# Patient Record
Sex: FEMALE | Race: WHITE | NOT HISPANIC OR LATINO | Employment: OTHER | URBAN - METROPOLITAN AREA
[De-identification: names, ages, dates, MRNs, and addresses within clinical notes are randomized per-mention and may not be internally consistent; named-entity substitution may affect disease eponyms.]

---

## 2017-02-08 DIAGNOSIS — M25.50 ARTHRALGIA, UNSPECIFIED JOINT: Primary | ICD-10-CM

## 2017-02-08 RX ORDER — DICLOFENAC SODIUM 75 MG/1
75 TABLET, DELAYED RELEASE ORAL 2 TIMES DAILY
Qty: 60 TABLET | Refills: 1 | Status: SHIPPED | OUTPATIENT
Start: 2017-02-08 | End: 2017-08-31 | Stop reason: SDUPTHER

## 2017-02-08 NOTE — TELEPHONE ENCOUNTER
2/8/17    Can we send this rx refill in to the pharmacy?    This rx was last sent in to the pharmacy on 9/7/16 with no refills.    Pt's last OV was on 12/7/16, next OV is on 6/13/17.

## 2017-03-13 ENCOUNTER — TRANSCRIBE ORDERS (OUTPATIENT)
Dept: INTERNAL MEDICINE | Facility: CLINIC | Age: 67
End: 2017-03-13

## 2017-03-13 DIAGNOSIS — Z12.31 VISIT FOR SCREENING MAMMOGRAM: Primary | ICD-10-CM

## 2017-05-02 ENCOUNTER — HOSPITAL ENCOUNTER (OUTPATIENT)
Dept: MAMMOGRAPHY | Facility: HOSPITAL | Age: 67
Discharge: HOME | End: 2017-05-02
Attending: HOSPITALIST | Admitting: HOSPITALIST

## 2017-05-02 DIAGNOSIS — Z12.31 VISIT FOR SCREENING MAMMOGRAM: ICD-10-CM

## 2017-05-02 PROCEDURE — 77063 BREAST TOMOSYNTHESIS BI: CPT | Performed by: RADIOLOGY

## 2017-05-02 PROCEDURE — G0202 SCR MAMMO BI INCL CAD: HCPCS

## 2017-05-02 PROCEDURE — G0202 SCR MAMMO BI INCL CAD: HCPCS | Performed by: RADIOLOGY

## 2017-05-02 PROCEDURE — 77063 BREAST TOMOSYNTHESIS BI: CPT

## 2017-05-24 ENCOUNTER — TELEPHONE (OUTPATIENT)
Dept: INTERNAL MEDICINE | Facility: CLINIC | Age: 67
End: 2017-05-24

## 2017-05-24 DIAGNOSIS — I10 ESSENTIAL HYPERTENSION: Primary | ICD-10-CM

## 2017-05-24 DIAGNOSIS — E78.49 OTHER HYPERLIPIDEMIA: ICD-10-CM

## 2017-06-05 ENCOUNTER — LAB (OUTPATIENT)
Dept: INTERNAL MEDICINE | Facility: CLINIC | Age: 67
End: 2017-06-05

## 2017-06-05 DIAGNOSIS — I10 ESSENTIAL HYPERTENSION: ICD-10-CM

## 2017-06-05 DIAGNOSIS — E78.49 OTHER HYPERLIPIDEMIA: ICD-10-CM

## 2017-06-05 LAB
ALBUMIN SERPL-MCNC: 4.4 G/DL (ref 3.2–4.8)
ALBUMIN/GLOB SERPL: 1.7 G/DL (ref 1.5–2.5)
ALP SERPL-CCNC: 76 U/L (ref 25–100)
ALT SERPL W P-5'-P-CCNC: 25 U/L (ref 7–40)
ANION GAP SERPL CALCULATED.3IONS-SCNC: 11 MMOL/L (ref 3–11)
ARTICHOKE IGE QN: 182 MG/DL (ref 0–130)
AST SERPL-CCNC: 25 U/L (ref 0–33)
BILIRUB SERPL-MCNC: 1 MG/DL (ref 0.3–1.2)
BUN BLD-MCNC: 15 MG/DL (ref 9–23)
BUN/CREAT SERPL: 18.8 (ref 7–25)
CALCIUM SPEC-SCNC: 9.7 MG/DL (ref 8.7–10.4)
CHLORIDE SERPL-SCNC: 106 MMOL/L (ref 99–109)
CHOLEST SERPL-MCNC: 246 MG/DL (ref 0–200)
CO2 SERPL-SCNC: 24 MMOL/L (ref 20–31)
CREAT BLD-MCNC: 0.8 MG/DL (ref 0.6–1.3)
GFR SERPL CREATININE-BSD FRML MDRD: 71 ML/MIN/1.73
GLOBULIN UR ELPH-MCNC: 2.6 GM/DL
GLUCOSE BLD-MCNC: 105 MG/DL (ref 70–100)
HDLC SERPL-MCNC: 49 MG/DL (ref 40–60)
POTASSIUM BLD-SCNC: 4.4 MMOL/L (ref 3.5–5.5)
PROT SERPL-MCNC: 7 G/DL (ref 5.7–8.2)
SODIUM BLD-SCNC: 141 MMOL/L (ref 132–146)
TRIGL SERPL-MCNC: 170 MG/DL (ref 0–150)

## 2017-06-05 PROCEDURE — 80053 COMPREHEN METABOLIC PANEL: CPT | Performed by: HOSPITALIST

## 2017-06-05 PROCEDURE — 80061 LIPID PANEL: CPT | Performed by: HOSPITALIST

## 2017-06-13 ENCOUNTER — OFFICE VISIT (OUTPATIENT)
Dept: INTERNAL MEDICINE | Facility: CLINIC | Age: 67
End: 2017-06-13

## 2017-06-13 VITALS
DIASTOLIC BLOOD PRESSURE: 70 MMHG | OXYGEN SATURATION: 99 % | SYSTOLIC BLOOD PRESSURE: 122 MMHG | WEIGHT: 195 LBS | HEIGHT: 58 IN | HEART RATE: 58 BPM | BODY MASS INDEX: 40.93 KG/M2

## 2017-06-13 DIAGNOSIS — E66.01 MORBID OBESITY DUE TO EXCESS CALORIES (HCC): ICD-10-CM

## 2017-06-13 DIAGNOSIS — M19.90 ARTHRITIS: ICD-10-CM

## 2017-06-13 DIAGNOSIS — M54.50 CHRONIC BILATERAL LOW BACK PAIN WITHOUT SCIATICA: ICD-10-CM

## 2017-06-13 DIAGNOSIS — R73.9 HYPERGLYCEMIA: ICD-10-CM

## 2017-06-13 DIAGNOSIS — E78.49 OTHER HYPERLIPIDEMIA: Primary | ICD-10-CM

## 2017-06-13 DIAGNOSIS — M79.10 MUSCULAR ACHES: ICD-10-CM

## 2017-06-13 DIAGNOSIS — I10 ESSENTIAL HYPERTENSION: ICD-10-CM

## 2017-06-13 DIAGNOSIS — I87.2 VENOUS INSUFFICIENCY: ICD-10-CM

## 2017-06-13 DIAGNOSIS — G89.29 CHRONIC BILATERAL LOW BACK PAIN WITHOUT SCIATICA: ICD-10-CM

## 2017-06-13 LAB — HBA1C MFR BLD: 5.7 %

## 2017-06-13 PROCEDURE — 99214 OFFICE O/P EST MOD 30 MIN: CPT | Performed by: HOSPITALIST

## 2017-06-13 PROCEDURE — 83036 HEMOGLOBIN GLYCOSYLATED A1C: CPT | Performed by: HOSPITALIST

## 2017-06-13 RX ORDER — ROSUVASTATIN CALCIUM 10 MG/1
10 TABLET, COATED ORAL DAILY
Qty: 90 TABLET | Refills: 1 | Status: SHIPPED | OUTPATIENT
Start: 2017-06-13 | End: 2017-08-31

## 2017-06-13 NOTE — PROGRESS NOTES
Subjective   Bharti Haines is a 67 y.o. female. Essential hypertension; Morbid obesity due to excess calories; Chronic bilateral low back pain without sciatica; Muscular aches; Mixed hyperlipidemia; Med Refill (diclofenac); and discuss lab results (lipid panel, CMP)         HPI Comments: She has been having some calf pain and we again talked about lipid lowering meds. She says she already aches and doesn't want to take meds for it. She has tried statins but they increase her muscle aches. She is willing to try Crestor 10mg. She has also tried to change her diet. She is having very little motivation to do so.       The following portions of the patient's history were reviewed and updated as appropriate: allergies, current medications, past family history, past medical history, past social history, past surgical history and problem list.    Review of Systems   Constitutional: Negative for activity change and appetite change.   HENT: Negative for congestion and drooling.    Eyes: Negative for discharge and itching.   Respiratory: Negative for apnea and chest tightness.    Cardiovascular: Negative for chest pain and leg swelling.   Gastrointestinal: Negative for abdominal distention and abdominal pain.   Endocrine: Negative for cold intolerance and heat intolerance.   Genitourinary: Negative for difficulty urinating and dyspareunia.   Musculoskeletal: Positive for myalgias. Negative for arthralgias and back pain.   Neurological: Negative for dizziness and facial asymmetry.   Hematological: Negative for adenopathy. Does not bruise/bleed easily.   Psychiatric/Behavioral: Negative for agitation and behavioral problems.       Objective   Vitals:    06/13/17 0927   BP: 122/70   Pulse: 58   SpO2: 99%       Physical Exam   Constitutional: She is oriented to person, place, and time. She appears well-developed and well-nourished.   HENT:   Head: Normocephalic and atraumatic.   Eyes: Conjunctivae and EOM are normal. Pupils are  equal, round, and reactive to light.   Neck: Normal range of motion. Neck supple.   Cardiovascular: Normal rate, regular rhythm, normal heart sounds and intact distal pulses.    Pulmonary/Chest: Effort normal and breath sounds normal.   Abdominal: Soft. Bowel sounds are normal.   Neurological: She is alert and oriented to person, place, and time. She has normal reflexes.   Skin: Skin is warm and dry.   Psychiatric: She has a normal mood and affect. Her behavior is normal. Judgment and thought content normal.       Assessment/Plan   Bharti was seen today for essential hypertension, morbid obesity due to excess calories, chronic bilateral low back pain without sciatica, muscular aches, mixed hyperlipidemia, med refill and discuss lab results.    Diagnoses and all orders for this visit:    Other hyperlipidemia  -     rosuvastatin (CRESTOR) 10 MG tablet; Take 1 tablet by mouth Daily.    Morbid obesity due to excess calories  -     Ambulatory Referral to Nutrition Services    Essential hypertension    Venous insufficiency    Muscular aches    Chronic bilateral low back pain without sciatica    Arthritis    Hyperglycemia  -     POC Glycosylated Hemoglobin (Hb A1C)        Results for orders placed or performed in visit on 06/05/17   Comprehensive metabolic panel   Result Value Ref Range    Glucose 105 (H) 70 - 100 mg/dL    BUN 15 9 - 23 mg/dL    Creatinine 0.80 0.60 - 1.30 mg/dL    Sodium 141 132 - 146 mmol/L    Potassium 4.4 3.5 - 5.5 mmol/L    Chloride 106 99 - 109 mmol/L    CO2 24.0 20.0 - 31.0 mmol/L    Calcium 9.7 8.7 - 10.4 mg/dL    Total Protein 7.0 5.7 - 8.2 g/dL    Albumin 4.40 3.20 - 4.80 g/dL    ALT (SGPT) 25 7 - 40 U/L    AST (SGOT) 25 0 - 33 U/L    Alkaline Phosphatase 76 25 - 100 U/L    Total Bilirubin 1.0 0.3 - 1.2 mg/dL    eGFR Non African Amer 71 >60 mL/min/1.73    Globulin 2.6 gm/dL    A/G Ratio 1.7 1.5 - 2.5 g/dL    BUN/Creatinine Ratio 18.8 7.0 - 25.0    Anion Gap 11.0 3.0 - 11.0 mmol/L   Lipid Panel    Result Value Ref Range    Total Cholesterol 246 (H) 0 - 200 mg/dL    Triglycerides 170 (H) 0 - 150 mg/dL    HDL Cholesterol 49 40 - 60 mg/dL    LDL Cholesterol  182 (H) 0 - 130 mg/dL

## 2017-08-14 ENCOUNTER — TELEPHONE (OUTPATIENT)
Dept: INTERNAL MEDICINE | Facility: CLINIC | Age: 67
End: 2017-08-14

## 2017-08-14 NOTE — TELEPHONE ENCOUNTER
Usually I get the patient's to sign a release on the day of their visit to get old records. I do not need Ortho's records in order to complete her visit on 8/31.   This is a new patient visit let her know that I am leaving the end of September.

## 2017-08-14 NOTE — TELEPHONE ENCOUNTER
----- Message from Clarita Brooks sent at 8/14/2017 10:20 AM EDT -----  Contact: SELF  VANNESSA RENTERIA WILL BE HAVING SURGERY IN A MONTH OR SO FOR A KNEE REPLACEMENT. THIS WILL BE DONE AT [facility] BY [facility]. SHE HAS HER FIRST APPT HER ON 8/31/17 AND WANTS TO KNOW IF YOU CAN REQUEST HER RECORDS FROM THAT OFFICE.  [facility] PH: [phone] FAX: [phone]     ASSISTANT CAROLANN VILLATORO PH: [phone]     VANNESSA CAN BE REACHED [phone]

## 2017-08-14 NOTE — TELEPHONE ENCOUNTER
Please advise? Pt was previous Dr. Crawford pt. Has appt scheduled with you on 08/31/17. You have never seen pt before. Would you like records from  Ortho?

## 2017-08-31 ENCOUNTER — OFFICE VISIT (OUTPATIENT)
Dept: INTERNAL MEDICINE | Facility: CLINIC | Age: 67
End: 2017-08-31

## 2017-08-31 VITALS
HEIGHT: 57 IN | RESPIRATION RATE: 16 BRPM | BODY MASS INDEX: 41.94 KG/M2 | HEART RATE: 62 BPM | SYSTOLIC BLOOD PRESSURE: 120 MMHG | DIASTOLIC BLOOD PRESSURE: 72 MMHG | WEIGHT: 194.4 LBS

## 2017-08-31 DIAGNOSIS — M25.50 ARTHRALGIA, UNSPECIFIED JOINT: ICD-10-CM

## 2017-08-31 DIAGNOSIS — E78.2 MIXED HYPERLIPIDEMIA: Primary | ICD-10-CM

## 2017-08-31 DIAGNOSIS — I10 ESSENTIAL HYPERTENSION: ICD-10-CM

## 2017-08-31 DIAGNOSIS — M19.90 OSTEOARTHRITIS, UNSPECIFIED OSTEOARTHRITIS TYPE, UNSPECIFIED SITE: ICD-10-CM

## 2017-08-31 DIAGNOSIS — E78.49 OTHER HYPERLIPIDEMIA: ICD-10-CM

## 2017-08-31 PROCEDURE — 99214 OFFICE O/P EST MOD 30 MIN: CPT | Performed by: INTERNAL MEDICINE

## 2017-08-31 RX ORDER — DICLOFENAC SODIUM 75 MG/1
75 TABLET, DELAYED RELEASE ORAL 2 TIMES DAILY
Qty: 60 TABLET | Refills: 1 | Status: SHIPPED | OUTPATIENT
Start: 2017-08-31 | End: 2017-11-29 | Stop reason: SDUPTHER

## 2017-09-12 ENCOUNTER — HOSPITAL ENCOUNTER (OUTPATIENT)
Dept: GENERAL RADIOLOGY | Facility: HOSPITAL | Age: 67
Discharge: HOME | End: 2017-09-12
Admitting: PHYSICIAN ASSISTANT

## 2017-09-12 ENCOUNTER — OFFICE VISIT (OUTPATIENT)
Dept: INTERNAL MEDICINE | Facility: CLINIC | Age: 67
End: 2017-09-12

## 2017-09-12 VITALS
OXYGEN SATURATION: 97 % | DIASTOLIC BLOOD PRESSURE: 72 MMHG | BODY MASS INDEX: 41.76 KG/M2 | WEIGHT: 193 LBS | RESPIRATION RATE: 16 BRPM | SYSTOLIC BLOOD PRESSURE: 134 MMHG | HEART RATE: 59 BPM | TEMPERATURE: 98.2 F

## 2017-09-12 DIAGNOSIS — Z01.818 PREOP EXAMINATION: Primary | ICD-10-CM

## 2017-09-12 DIAGNOSIS — M25.561 CHRONIC PAIN OF RIGHT KNEE: ICD-10-CM

## 2017-09-12 DIAGNOSIS — R73.09 ELEVATED GLUCOSE: ICD-10-CM

## 2017-09-12 DIAGNOSIS — G89.29 CHRONIC PAIN OF RIGHT KNEE: ICD-10-CM

## 2017-09-12 LAB
ALBUMIN SERPL-MCNC: 4.4 G/DL (ref 3.2–4.8)
ALBUMIN/GLOB SERPL: 1.8 G/DL (ref 1.5–2.5)
ALP SERPL-CCNC: 82 U/L (ref 25–100)
ALT SERPL W P-5'-P-CCNC: 27 U/L (ref 7–40)
ANION GAP SERPL CALCULATED.3IONS-SCNC: 11 MMOL/L (ref 3–11)
AST SERPL-CCNC: 26 U/L (ref 0–33)
BASOPHILS # BLD AUTO: 0.03 10*3/MM3 (ref 0–0.2)
BASOPHILS NFR BLD AUTO: 0.4 % (ref 0–1)
BILIRUB BLD-MCNC: NEGATIVE MG/DL
BILIRUB SERPL-MCNC: 0.9 MG/DL (ref 0.3–1.2)
BUN BLD-MCNC: 14 MG/DL (ref 9–23)
BUN/CREAT SERPL: 17.5 (ref 7–25)
CALCIUM SPEC-SCNC: 9.7 MG/DL (ref 8.7–10.4)
CHLORIDE SERPL-SCNC: 98 MMOL/L (ref 99–109)
CLARITY, POC: CLEAR
CO2 SERPL-SCNC: 28 MMOL/L (ref 20–31)
COLOR UR: YELLOW
CREAT BLD-MCNC: 0.8 MG/DL (ref 0.6–1.3)
DEPRECATED RDW RBC AUTO: 46.3 FL (ref 37–54)
EOSINOPHIL # BLD AUTO: 0.09 10*3/MM3 (ref 0–0.3)
EOSINOPHIL NFR BLD AUTO: 1.2 % (ref 0–3)
ERYTHROCYTE [DISTWIDTH] IN BLOOD BY AUTOMATED COUNT: 14.1 % (ref 11.3–14.5)
EXPIRATION DATE: NORMAL
EXPIRATION DATE: NORMAL
GFR SERPL CREATININE-BSD FRML MDRD: 71 ML/MIN/1.73
GLOBULIN UR ELPH-MCNC: 2.5 GM/DL
GLUCOSE BLD-MCNC: 101 MG/DL (ref 70–100)
GLUCOSE UR STRIP-MCNC: NEGATIVE MG/DL
HBA1C MFR BLD: 5.5 %
HCT VFR BLD AUTO: 39.8 % (ref 34.5–44)
HGB BLD-MCNC: 13.9 G/DL (ref 11.5–15.5)
IMM GRANULOCYTES # BLD: 0.02 10*3/MM3 (ref 0–0.03)
IMM GRANULOCYTES NFR BLD: 0.3 % (ref 0–0.6)
KETONES UR QL: NEGATIVE
LEUKOCYTE EST, POC: NEGATIVE
LYMPHOCYTES # BLD AUTO: 1.79 10*3/MM3 (ref 0.6–4.8)
LYMPHOCYTES NFR BLD AUTO: 24 % (ref 24–44)
Lab: NORMAL
Lab: NORMAL
MCH RBC QN AUTO: 31.8 PG (ref 27–31)
MCHC RBC AUTO-ENTMCNC: 34.9 G/DL (ref 32–36)
MCV RBC AUTO: 91.1 FL (ref 80–99)
MONOCYTES # BLD AUTO: 0.71 10*3/MM3 (ref 0–1)
MONOCYTES NFR BLD AUTO: 9.5 % (ref 0–12)
NEUTROPHILS # BLD AUTO: 4.83 10*3/MM3 (ref 1.5–8.3)
NEUTROPHILS NFR BLD AUTO: 64.6 % (ref 41–71)
NITRITE UR-MCNC: NEGATIVE MG/ML
PH UR: 7 [PH] (ref 5–8)
PLATELET # BLD AUTO: 262 10*3/MM3 (ref 150–450)
PMV BLD AUTO: 10 FL (ref 6–12)
POTASSIUM BLD-SCNC: 4.3 MMOL/L (ref 3.5–5.5)
PROT SERPL-MCNC: 6.9 G/DL (ref 5.7–8.2)
PROT UR STRIP-MCNC: NEGATIVE MG/DL
RBC # BLD AUTO: 4.37 10*6/MM3 (ref 3.89–5.14)
RBC # UR STRIP: NEGATIVE /UL
SODIUM BLD-SCNC: 137 MMOL/L (ref 132–146)
SP GR UR: 1.01 (ref 1–1.03)
UROBILINOGEN UR QL: NORMAL
WBC NRBC COR # BLD: 7.47 10*3/MM3 (ref 3.5–10.8)

## 2017-09-12 PROCEDURE — 99214 OFFICE O/P EST MOD 30 MIN: CPT | Performed by: PHYSICIAN ASSISTANT

## 2017-09-12 PROCEDURE — 85025 COMPLETE CBC W/AUTO DIFF WBC: CPT | Performed by: PHYSICIAN ASSISTANT

## 2017-09-12 PROCEDURE — 80053 COMPREHEN METABOLIC PANEL: CPT | Performed by: PHYSICIAN ASSISTANT

## 2017-09-12 PROCEDURE — 87147 CULTURE TYPE IMMUNOLOGIC: CPT | Performed by: PHYSICIAN ASSISTANT

## 2017-09-12 PROCEDURE — 36415 COLL VENOUS BLD VENIPUNCTURE: CPT | Performed by: PHYSICIAN ASSISTANT

## 2017-09-12 PROCEDURE — 87077 CULTURE AEROBIC IDENTIFY: CPT | Performed by: PHYSICIAN ASSISTANT

## 2017-09-12 PROCEDURE — 87186 SC STD MICRODIL/AGAR DIL: CPT | Performed by: PHYSICIAN ASSISTANT

## 2017-09-12 PROCEDURE — 87070 CULTURE OTHR SPECIMN AEROBIC: CPT | Performed by: PHYSICIAN ASSISTANT

## 2017-09-12 PROCEDURE — 71020 HC CHEST PA AND LATERAL: CPT

## 2017-09-12 PROCEDURE — 83036 HEMOGLOBIN GLYCOSYLATED A1C: CPT | Performed by: PHYSICIAN ASSISTANT

## 2017-09-12 PROCEDURE — 81003 URINALYSIS AUTO W/O SCOPE: CPT | Performed by: PHYSICIAN ASSISTANT

## 2017-09-12 NOTE — PROGRESS NOTES
Subjective   Bharti Haines is a 67 y.o. female.   Chief Complaint   Patient presents with   • Pre-op Exam       History of Present Illness   PT here for pre op exam for right knee replacement. At [facility] by Dr Bowers.  Hx of left knee replacement.  It is difficult for her to walk without pain and limping.    No personal or family of anesthesia reactions.    Allergies   Allergen Reactions   • Cefdinir    • Penicillins      Past Medical History:   Diagnosis Date   • Allergic    • Arthritis    • Breast cancer 2008    right   • Cancer     breast   • Cataract    • History of breast cancer    • Hx of radiation therapy     right breast   • Hyperlipidemia    • Hypertension    • Osteoarthritis    • Peptic ulcer    • Sciatica      Past Surgical History:   Procedure Laterality Date   • BACK SURGERY     • BREAST BIOPSY Right 2008    US bx   • BREAST LUMPECTOMY Right 2008   • BREAST SURGERY     • CARPAL TUNNEL RELEASE Bilateral    •  SECTION     • REPLACEMENT TOTAL KNEE           Social History     Social History   • Marital status:      Spouse name: N/A   • Number of children: N/A   • Years of education: N/A     Occupational History   • Not on file.     Social History Main Topics   • Smoking status: Never Smoker   • Smokeless tobacco: Never Used   • Alcohol use No   • Drug use: No   • Sexual activity: Defer     Other Topics Concern   • Not on file     Social History Narrative   • No narrative on file       Family History   Problem Relation Age of Onset   • Cancer Mother    • Breast cancer Mother      pt states 60's   • Stroke Mother    • Heart disease Father    • Stroke Father    • Diabetes Sister    • Hypertension Sister    • Osteoarthritis Sister    • Osteoporosis Sister    • Arthritis Other    • Breast cancer Maternal Aunt      pt states 60's   • Ovarian cancer Neg Hx          The following portions of the patient's history were reviewed and updated as appropriate: allergies,  current medications, past family history, past medical history, past social history, past surgical history and problem list.    Review of Systems   Constitutional: Negative for chills and fever.   Eyes: Negative for visual disturbance.   Respiratory: Negative for shortness of breath.    Cardiovascular: Negative for chest pain.   Gastrointestinal: Negative for constipation, diarrhea, nausea and vomiting.   Genitourinary: Negative for dysuria and frequency.   Musculoskeletal: Positive for arthralgias, back pain and gait problem.   Allergic/Immunologic: Negative.    Neurological: Negative for headaches.   Psychiatric/Behavioral: Positive for sleep disturbance.       Objective   Physical Exam   Constitutional: She appears well-developed and well-nourished.   HENT:   Head: Normocephalic.   Right Ear: Hearing, tympanic membrane and external ear normal.   Left Ear: Hearing, tympanic membrane and external ear normal.   Nose: Nose normal.   Mouth/Throat: Oropharynx is clear and moist.   Eyes: Conjunctivae and EOM are normal. Pupils are equal, round, and reactive to light. No scleral icterus.   Neck: Normal carotid pulses present. Carotid bruit is not present. No tracheal deviation present. No thyromegaly present.   Cardiovascular: Normal rate, regular rhythm, normal heart sounds and intact distal pulses.    No murmur heard.  No pitting edema of the LE.   Pulmonary/Chest: Effort normal. No respiratory distress. She has no decreased breath sounds. She has no wheezes. She has no rhonchi. She has no rales. She exhibits no tenderness.   Abdominal: Soft. Bowel sounds are normal. She exhibits no distension and no mass. There is no tenderness.   Musculoskeletal: She exhibits no edema or tenderness.   Lymphadenopathy:     She has no cervical adenopathy.   Neurological: She has normal strength. She displays normal reflexes. No cranial nerve deficit.   Reflex Scores:       Patellar reflexes are 1+ on the right side and 0 on the left  side.  Limping gait     Skin: No rash noted. No erythema.   Psychiatric: She has a normal mood and affect. Her behavior is normal. Judgment and thought content normal.   Vitals reviewed.  Body mass index is 41.76 kg/(m^2).    ECG 12 Lead  Date/Time: 9/15/2017 8:04 AM  Performed by: RICHY PHILLIPS  Authorized by: RICHY PHILLIPS   Comparison: not compared with previous ECG   Rhythm: sinus rhythm  Rate: normal  Conduction: conduction normal  ST Segments: ST segments normal  T Waves: T waves normal  QRS axis: normal  Other: no other findings  Clinical impression: normal ECG            Assessment/Plan   Bharti was seen today for pre-op exam.    Diagnoses and all orders for this visit:    Preop examination  -     ECG 12 Lead  -     XR Chest 2 View  -     CBC & Differential  -     Comprehensive Metabolic Panel  -     POC Urinalysis Dipstick, Automated  -     POC Glycosylated Hemoglobin (Hb A1C)  -     Cancel: Culture, Routine  -     Cancel: Urine Drug Screen  -     CBC Auto Differential  -     Routine Culture, No Gram Stain; Future  -     Routine Culture, No Gram Stain    Chronic pain of right knee  -     ECG 12 Lead  -     XR Chest 2 View  -     CBC & Differential  -     Comprehensive Metabolic Panel  -     POC Urinalysis Dipstick, Automated  -     POC Glycosylated Hemoglobin (Hb A1C)  -     Cancel: Culture, Routine  -     Cancel: Urine Drug Screen  -     CBC Auto Differential  -     Routine Culture, No Gram Stain; Future  -     Routine Culture, No Gram Stain    Elevated glucose  -     POC Glycosylated Hemoglobin (Hb A1C)  -     Routine Culture, No Gram Stain; Future  -     Routine Culture, No Gram Stain      Results for orders placed or performed in visit on 09/12/17   Routine Culture, No Gram Stain   Result Value Ref Range    Culture Light growth (2+) Staphylococcus aureus (A)    Comprehensive Metabolic Panel   Result Value Ref Range    Glucose 101 (H) 70 - 100 mg/dL    BUN 14 9 - 23 mg/dL    Creatinine 0.80 0.60 -  1.30 mg/dL    Sodium 137 132 - 146 mmol/L    Potassium 4.3 3.5 - 5.5 mmol/L    Chloride 98 (L) 99 - 109 mmol/L    CO2 28.0 20.0 - 31.0 mmol/L    Calcium 9.7 8.7 - 10.4 mg/dL    Total Protein 6.9 5.7 - 8.2 g/dL    Albumin 4.40 3.20 - 4.80 g/dL    ALT (SGPT) 27 7 - 40 U/L    AST (SGOT) 26 0 - 33 U/L    Alkaline Phosphatase 82 25 - 100 U/L    Total Bilirubin 0.9 0.3 - 1.2 mg/dL    eGFR Non African Amer 71 >60 mL/min/1.73    Globulin 2.5 gm/dL    A/G Ratio 1.8 1.5 - 2.5 g/dL    BUN/Creatinine Ratio 17.5 7.0 - 25.0    Anion Gap 11.0 3.0 - 11.0 mmol/L   CBC Auto Differential   Result Value Ref Range    WBC 7.47 3.50 - 10.80 10*3/mm3    RBC 4.37 3.89 - 5.14 10*6/mm3    Hemoglobin 13.9 11.5 - 15.5 g/dL    Hematocrit 39.8 34.5 - 44.0 %    MCV 91.1 80.0 - 99.0 fL    MCH 31.8 (H) 27.0 - 31.0 pg    MCHC 34.9 32.0 - 36.0 g/dL    RDW 14.1 11.3 - 14.5 %    RDW-SD 46.3 37.0 - 54.0 fl    MPV 10.0 6.0 - 12.0 fL    Platelets 262 150 - 450 10*3/mm3    Neutrophil % 64.6 41.0 - 71.0 %    Lymphocyte % 24.0 24.0 - 44.0 %    Monocyte % 9.5 0.0 - 12.0 %    Eosinophil % 1.2 0.0 - 3.0 %    Basophil % 0.4 0.0 - 1.0 %    Immature Grans % 0.3 0.0 - 0.6 %    Neutrophils, Absolute 4.83 1.50 - 8.30 10*3/mm3    Lymphocytes, Absolute 1.79 0.60 - 4.80 10*3/mm3    Monocytes, Absolute 0.71 0.00 - 1.00 10*3/mm3    Eosinophils, Absolute 0.09 0.00 - 0.30 10*3/mm3    Basophils, Absolute 0.03 0.00 - 0.20 10*3/mm3    Immature Grans, Absolute 0.02 0.00 - 0.03 10*3/mm3   POC Urinalysis Dipstick, Automated   Result Value Ref Range    Color Yellow Yellow, Straw, Dark Yellow, Mallory    Clarity, UA Clear Clear    Glucose, UA Negative Negative, 1000 mg/dL (3+) mg/dL    Bilirubin Negative Negative    Ketones, UA Negative Negative    Specific Gravity  1.010 1.005 - 1.030    Blood, UA Negative Negative    pH, Urine 7.0 5.0 - 8.0    Protein, POC Negative Negative mg/dL    Urobilinogen, UA Normal Normal    Leukocytes Negative Negative    Nitrite, UA Negative Negative     Lot Number [id number]     Expiration Date 5-31-18    POC Glycosylated Hemoglobin (Hb A1C)   Result Value Ref Range    Hemoglobin A1C 5.5 %    Lot Number [id number]     Expiration Date 5-19      CXR was normal.    She will hold the diclofenac and ASA 7 days prior to surgery.    Pt has done dental clearance  Labs are acceptable, except that nasal swab had staph aureus.    Pt is low risk for cardiovascular complications.  Should anything change between now and then, re-evaluation is required. She is cleared for surgery.

## 2017-09-15 ENCOUNTER — TELEPHONE (OUTPATIENT)
Dept: INTERNAL MEDICINE | Facility: CLINIC | Age: 67
End: 2017-09-15

## 2017-09-15 LAB — BACTERIA SPEC AEROBE CULT: ABNORMAL

## 2017-09-15 PROCEDURE — 93000 ELECTROCARDIOGRAM COMPLETE: CPT | Performed by: PHYSICIAN ASSISTANT

## 2017-09-15 NOTE — TELEPHONE ENCOUNTER
----- Message from Ana Gomez PA-C sent at 9/15/2017  8:06 AM EDT -----  Can you fax pre op note, cxr report, and EKG image attn: Edith/Dr Valente at  [phone]

## 2017-10-12 ENCOUNTER — TELEPHONE (OUTPATIENT)
Dept: INTERNAL MEDICINE | Facility: CLINIC | Age: 67
End: 2017-10-12

## 2017-10-12 NOTE — TELEPHONE ENCOUNTER
----- Message from Linda Anthony sent at 10/12/2017  1:14 PM EDT -----  [phone]    WILL YOU ACCEPT PT BACK?  SHE USE TO SEE YOU IN 2013 AND THEN WAS A PT OF DR ROBLEDO

## 2017-10-18 ENCOUNTER — OFFICE VISIT (OUTPATIENT)
Dept: NEUROSURGERY | Facility: CLINIC | Age: 67
End: 2017-10-18

## 2017-10-18 VITALS
BODY MASS INDEX: 42.07 KG/M2 | TEMPERATURE: 97.8 F | DIASTOLIC BLOOD PRESSURE: 75 MMHG | HEIGHT: 57 IN | SYSTOLIC BLOOD PRESSURE: 126 MMHG | WEIGHT: 195 LBS

## 2017-10-18 DIAGNOSIS — S39.012A STRAIN OF LUMBAR REGION, INITIAL ENCOUNTER: Primary | ICD-10-CM

## 2017-10-18 DIAGNOSIS — M47.816 FACET ARTHRITIS OF LUMBAR REGION: ICD-10-CM

## 2017-10-18 DIAGNOSIS — M51.36 DEGENERATIVE DISC DISEASE, LUMBAR: ICD-10-CM

## 2017-10-18 PROCEDURE — 99213 OFFICE O/P EST LOW 20 MIN: CPT | Performed by: PHYSICIAN ASSISTANT

## 2017-10-18 RX ORDER — METHYLPREDNISOLONE 4 MG/1
TABLET ORAL
Qty: 1 EACH | Refills: 0 | Status: SHIPPED | OUTPATIENT
Start: 2017-10-18 | End: 2017-11-08

## 2017-10-18 NOTE — PROGRESS NOTES
Patient: Bharti Haines  : 1950  Chart #: [id number]    Date of Service: 10/18/2017    CHIEF COMPLAINT: Low back and right leg pain    History of Present Illness Ms. Haines is a 67-year-old woman well known to our clinic.  On 10/20/2015 she underwent left L4-5 discectomy with good resolution of her preoperative radiculopathy; however, she continued with some low-grade low back pain.  She also has knee difficulties.  She had a left total knee replacement and was scheduled to have a  Right total knee replacement next week.  Two weeks ago she was unloading packages of water from her shopping cart and experienced pain in her low back.  Over the course of a couple days the pain began to extend down the posterior aspect of the right leg.  Pain does not go below her knees. Sometimes she has left sided symptoms but it is predominantly right.  It is worse with prolonged sitting and moving from sitting to standing.  Once she gets going she does better with regard to her acute symptoms.  Again she has chronic difficulties with her knees which has made ambulating difficult for her for some time. She has pain at night and difficulty finding a comfortable position.  Diclofenac alleviate her symptoms.     The following portions of the patient's history were reviewed and updated as appropriate: allergies, current medications, past family history, past medical history, past social history, past surgical history and problem list.    Review of Systems   Constitutional: Negative for activity change, appetite change, chills, diaphoresis, fatigue, fever and unexpected weight change.   HENT: Negative for congestion, dental problem, drooling, ear discharge, ear pain, facial swelling, hearing loss, mouth sores, nosebleeds, postnasal drip, rhinorrhea, sinus pressure, sneezing, sore throat, tinnitus, trouble swallowing and voice change.    Eyes: Negative for photophobia, pain, discharge, redness, itching and visual disturbance.  "  Respiratory: Negative for apnea, cough, choking, chest tightness, shortness of breath, wheezing and stridor.    Cardiovascular: Negative for chest pain, palpitations and leg swelling.   Gastrointestinal: Negative for abdominal distention, abdominal pain, anal bleeding, blood in stool, constipation, diarrhea, nausea, rectal pain and vomiting.   Endocrine: Negative for cold intolerance, heat intolerance, polydipsia, polyphagia and polyuria.   Genitourinary: Negative for decreased urine volume, difficulty urinating, dysuria, enuresis, flank pain, frequency, genital sores, hematuria and urgency.   Musculoskeletal: Positive for back pain and myalgias (Bilateral leg pain). Negative for arthralgias, gait problem, joint swelling, neck pain and neck stiffness.   Skin: Negative for color change, pallor, rash and wound.   Allergic/Immunologic: Negative for environmental allergies, food allergies and immunocompromised state.   Neurological: Negative for dizziness, tremors, seizures, syncope, facial asymmetry, speech difficulty, weakness, light-headedness, numbness and headaches.   Hematological: Negative for adenopathy. Does not bruise/bleed easily.   Psychiatric/Behavioral: Negative for agitation, behavioral problems, confusion, decreased concentration, dysphoric mood, hallucinations, self-injury, sleep disturbance and suicidal ideas. The patient is not nervous/anxious and is not hyperactive.    All other systems reviewed and are negative.      Objective   Vital Signs: Blood pressure 126/75, temperature 97.8 °F (36.6 °C), temperature source Temporal Artery , height 57\" (144.8 cm), weight 195 lb (88.5 kg), not currently breastfeeding.  Physical Exam   Constitutional: She appears well-developed and well-nourished. No distress.   HENT:   Head: Normocephalic and atraumatic.   Eyes: EOM are normal. Pupils are equal, round, and reactive to light.   Cardiovascular: Normal rate, regular rhythm and normal heart sounds.  "   Pulmonary/Chest: Effort normal and breath sounds normal.   Abdominal: Soft. There is no tenderness.   Psychiatric: She has a normal mood and affect. Her behavior is normal. Thought content normal.   Nursing note and vitals reviewed.  Musculoskeletal:  Strength is intact in upper and lower extremities to direct testing.  Muscle tone is normal throughout.  Station and gait are normal.  Straight leg raising is negative.  Neurologic:  Gait: Able to tandem walk without difficulty.  Coordination is intact.  Finger to nose, heel-to-shin, rapid alternating movements.  Deep tendon reflexes: Left knee is surgical and without reflex.  Right knee is 2+.  Ankle is difficult to elicit bilaterally  Sensation is intact to light touch throughout.  Patient is oriented to person, place, and time.      Assessment/Plan    Diagnosis: Degenerative disc and joint disease with acute flare up after unloading groceries 2 weeks ago  Medical Decision Making: I gave Ms. Haines a medrol dosepak and we dicussed trying formal physical therapy if she continues with pain. Her symptoms are fairly mild and I anticipate things to improve with time and rest. She will follow up with me in 6 weeks.                Rosamaria Reis PA-C  Patient Care Team:  Marshal Guerrero MD as PCP - General (Internal Medicine)  Mat Fleming MD as PCP - Claims Attributed

## 2017-11-08 ENCOUNTER — HOSPITAL ENCOUNTER (OUTPATIENT)
Dept: GENERAL RADIOLOGY | Facility: HOSPITAL | Age: 67
Discharge: HOME | End: 2017-11-08
Attending: INTERNAL MEDICINE | Admitting: INTERNAL MEDICINE

## 2017-11-08 ENCOUNTER — OFFICE VISIT (OUTPATIENT)
Dept: INTERNAL MEDICINE | Facility: CLINIC | Age: 67
End: 2017-11-08

## 2017-11-08 VITALS
DIASTOLIC BLOOD PRESSURE: 72 MMHG | TEMPERATURE: 99.3 F | WEIGHT: 196 LBS | RESPIRATION RATE: 20 BRPM | HEART RATE: 60 BPM | SYSTOLIC BLOOD PRESSURE: 136 MMHG | BODY MASS INDEX: 42.41 KG/M2

## 2017-11-08 DIAGNOSIS — M54.41 ACUTE MIDLINE LOW BACK PAIN WITH BILATERAL SCIATICA: ICD-10-CM

## 2017-11-08 DIAGNOSIS — I10 ESSENTIAL HYPERTENSION: ICD-10-CM

## 2017-11-08 DIAGNOSIS — M54.42 ACUTE MIDLINE LOW BACK PAIN WITH BILATERAL SCIATICA: Primary | ICD-10-CM

## 2017-11-08 DIAGNOSIS — M54.41 ACUTE MIDLINE LOW BACK PAIN WITH BILATERAL SCIATICA: Primary | ICD-10-CM

## 2017-11-08 DIAGNOSIS — M54.42 ACUTE MIDLINE LOW BACK PAIN WITH BILATERAL SCIATICA: ICD-10-CM

## 2017-11-08 LAB
ALBUMIN SERPL-MCNC: 4.2 G/DL (ref 3.2–4.8)
ALBUMIN/GLOB SERPL: 1.8 G/DL (ref 1.5–2.5)
ALP SERPL-CCNC: 78 U/L (ref 25–100)
ALT SERPL W P-5'-P-CCNC: 26 U/L (ref 7–40)
ANION GAP SERPL CALCULATED.3IONS-SCNC: 11 MMOL/L (ref 3–11)
ARTICHOKE IGE QN: 185 MG/DL (ref 0–130)
AST SERPL-CCNC: 23 U/L (ref 0–33)
BILIRUB SERPL-MCNC: 0.7 MG/DL (ref 0.3–1.2)
BUN BLD-MCNC: 21 MG/DL (ref 9–23)
BUN/CREAT SERPL: 26.3 (ref 7–25)
CALCIUM SPEC-SCNC: 9.8 MG/DL (ref 8.7–10.4)
CHLORIDE SERPL-SCNC: 103 MMOL/L (ref 99–109)
CHOLEST SERPL-MCNC: 247 MG/DL (ref 0–200)
CO2 SERPL-SCNC: 24 MMOL/L (ref 20–31)
CREAT BLD-MCNC: 0.8 MG/DL (ref 0.6–1.3)
GFR SERPL CREATININE-BSD FRML MDRD: 71 ML/MIN/1.73
GLOBULIN UR ELPH-MCNC: 2.4 GM/DL
GLUCOSE BLD-MCNC: 95 MG/DL (ref 70–100)
HDLC SERPL-MCNC: 47 MG/DL (ref 40–60)
POTASSIUM BLD-SCNC: 4.4 MMOL/L (ref 3.5–5.5)
PROT SERPL-MCNC: 6.6 G/DL (ref 5.7–8.2)
SODIUM BLD-SCNC: 138 MMOL/L (ref 132–146)
TRIGL SERPL-MCNC: 205 MG/DL (ref 0–150)

## 2017-11-08 PROCEDURE — 80053 COMPREHEN METABOLIC PANEL: CPT | Performed by: INTERNAL MEDICINE

## 2017-11-08 PROCEDURE — 80061 LIPID PANEL: CPT | Performed by: INTERNAL MEDICINE

## 2017-11-08 PROCEDURE — 36415 COLL VENOUS BLD VENIPUNCTURE: CPT | Performed by: INTERNAL MEDICINE

## 2017-11-08 PROCEDURE — 99214 OFFICE O/P EST MOD 30 MIN: CPT | Performed by: INTERNAL MEDICINE

## 2017-11-08 PROCEDURE — 72100 X-RAY EXAM L-S SPINE 2/3 VWS: CPT

## 2017-11-09 ENCOUNTER — TELEPHONE (OUTPATIENT)
Dept: INTERNAL MEDICINE | Facility: CLINIC | Age: 67
End: 2017-11-09

## 2017-11-09 NOTE — TELEPHONE ENCOUNTER
----- Message from Mellisa Trejo sent at 11/9/2017  9:16 AM EST -----  , Francesco Haines, called stating patient was seen yesterday and was supposed to get a disabled parking permit.  They left without this and want to  today.  Call  at [phone] when ready for .

## 2017-11-09 NOTE — TELEPHONE ENCOUNTER
----- Message from Jennifer Cartagena RN sent at 11/9/2017 11:16 AM EST -----      ----- Message -----     From: Mellisa Trejo     Sent: 11/9/2017   9:16 AM       To: Jennifer Cartagena RN    , Francesco Haines, called stating patient was seen yesterday and was supposed to get a disabled parking permit.  They left without this and want to  today.  Call  at [phone] when ready for .

## 2017-11-12 NOTE — PROGRESS NOTES
Chief Complaint   Patient presents with   • RE-ESTABLISH CARE       History of Present Illness    The patient presents for a follow-up related to low back pain which has been a chronic problem. She states that the pain was first noted after falling. The patient states that the fall was from the ground. She fell onto the ground landing on her back. She was seen by neurosurgery and was found to have a disc herniation. She had a subsequent discectomy. She did fairly well until October of 2017 when she lifted several water bottles. She again saw neurosurgery ([place]) on October 18. She was treated with a medrol dose pack and physical therapy but despite this her symptoms worsened. The pain is not associated with fever, chills, weight loss, rashes, dysuria, hematuria or a decreased urine stream. The patient denies a past history of malignancy. The pain radiates across the low back, to the right thigh, to the left thigh, to the right leg and to the left leg. There is no numbness. The patient denies loss of bladder control. The patient denies loss of bowel control. The patient has attempted physical therapy. The Physical Therapy did not help the pain. The patient reports lifting, bending and walking aggravates the symptoms.    The patient presents for a follow-up related to hypertension. The patient reports that she has had no headaches, chest pain, dyspnea, edema, syncope, blurred vision or palpitations. She states that she is taking her medication as prescribed. She reports that she is experiencing muscle cramps due to the medication. She checks her blood pressures at home. The home readings are normal.    Review of Systems    GENERAL- Denies Sweats, Fatigue, Weakness or Malaise.    HEENT- Denies Eye Pain, Eye Drainage, Nasal Discharge, Sore Throat, Ear Pain, Ear Drainage, Decreased Vision, Sinus Pain, Nasal Congestion, Decreased Hearing, Visual Disturbance, Diplopia or Tinnitus.    NECK- Denies Decreased Range of  Motion, Stiffness, Thyroid Nodules, Enlarged Lymph Nodes or Goiter.    CARDIOVASCULAR- Denies Claudication.    PULMONARY- Denies Wheezing, Sputum Production, Cough, Hemoptysis or Pleuritic Chest Pain.    GASTROINTESTINAL- Denies Abdominal Pain, Nausea, Vomiting, Diarrhea, Blood per Rectum, Constipation or Heartburn.    GENITOURINARY- Denies Urinary Frequency, Urinary Leakage, Pelvic Pain, Vaginal Prolapse, Vaginal Discharge, Dyspareunia or Abnormal Menses.    ENDOCRINE- Denies Cold Intolerance, Depression, Heat Intolerance, Memory Loss, Polydypsia, Polyphagia, Polyuria, Sleep Disturbance or Weight Gain.    NEUROLOGIC- Denies Seizures, Confusion or Excessive Daytime Sleepiness.    MUSCULOSKELETAL- Denies Joint Pain, Joint Stiffness, Decreased Range of Motion, Joint Swelling or Erythema of Joints.    INTEGUMENTARY- Denies Lumps, Sores, Itching, Dryness, Color Change, Changes in Hair, Brittle Nails, Discolored Nails, Thickened Nails or Growths.    Medications      Current Outpatient Prescriptions:   •  aspirin tablet, Take 81 mg by mouth daily., Disp: , Rfl:   •  diclofenac (VOLTAREN) 75 MG EC tablet, Take 1 tablet by mouth 2 (Two) Times a Day., Disp: 60 tablet, Rfl: 1  •  losartan-hydrochlorothiazide (HYZAAR) 50-12.5 MG per tablet, Take  by mouth daily., Disp: , Rfl:      Allergies    Allergies   Allergen Reactions   • Cefdinir Rash   • Penicillins Rash       Problem List    Patient Active Problem List   Diagnosis   • Arthritis   • Hyperlipidemia   • Hyperglycemia   • Hypertension   • Morbid obesity   • Muscular aches   • Venous insufficiency   • Osteoarthritis   • Low back pain   • Arthralgia     Past Medical History:   Diagnosis Date   • Allergic    • Arthritis    • Breast cancer 2008    right   • Cancer     breast   • Cataract    • History of breast cancer    • Hx of radiation therapy 2008    right breast   • Hyperlipidemia    • Hypertension    • Osteoarthritis    • Peptic ulcer    • Sciatica      Past Surgical  History:   Procedure Laterality Date   • BACK SURGERY     • BREAST BIOPSY Right 2008    US bx   • BREAST LUMPECTOMY Right 2008   • BREAST SURGERY     • CARPAL TUNNEL RELEASE Bilateral    •  SECTION     • REPLACEMENT TOTAL KNEE       Social History     Social History   • Marital status:      Spouse name: N/A   • Number of children: N/A   • Years of education: N/A     Social History Main Topics   • Smoking status: Never Smoker   • Smokeless tobacco: Never Used   • Alcohol use No   • Drug use: No   • Sexual activity: Defer     Other Topics Concern   • None     Social History Narrative       Medications, Allergies, Problems List and Past History were reviewed and updated.    Physical Examination    /72 (BP Location: Left arm, Patient Position: Sitting, Cuff Size: Adult)  Pulse 60  Temp 99.3 °F (37.4 °C) (Temporal Artery )   Resp 20  Wt 196 lb (88.9 kg)  LMP  (LMP Unknown) Comment: LAST PAP  BY DR FIELD  Breastfeeding? No  BMI 42.41 kg/m2    HEENT: Head- Normocephalic Atraumatic. Facies- Within normal limits. Pinnas- Normal texture and shape bilaterally. Canals- Normal bilaterally. TMs- Normal bilaterally. Nares- Patent bilaterally. Nasal Septum- is normal. There is no tenderness to palpation over the frontal or maxillary sinuses. Lids- Normal bilaterally. Conjunctiva- Clear bilaterally. Sclera- Anicteric bilaterally. Oropharynx- Moist with no lesions. Tonsils- No enlargement, erythema or exudate.    Neck: Thyroid- non enlarged, symmetric and has no nodules. No bruits are detected. ROM- Normal Range of Motion with no rigidity.    Lungs: Auscultation- Clear to auscultation bilaterally. There are no retractions, clubbing or cyanosis. The Expiratory to Inspiratory ratio is equal.    Cardiovascular: There are no carotid bruits. Heart- Normal Rate with Regular rhythm and no murmurs. There are no gallops. There are no rubs. In the lower extremities there is no edema. The upper  extremities do not have edema.    Abdomen: Soft, benign, non-tender with no masses, hernias, organomegaly or scars.    Back: The patient has a normal spinal curvature with no evidence of scoliosis. There are no skin lesions noted on the back. Palpation reveals no tenderness and normal muscle tone. The straight leg raising test is positive bilaterally. The back has decreased flexion, pain with flexion, decreased extension and pain with extension.    Lower Extremity Neuro Exam: Muscle Strength is 5/5 in all major lower extremity muscle groups. Deep Tendon Reflexes are 2+ and equal in the patellar and Achilles distributions bilaterally. Sensation is normal in all distributions.    Radiology    My personal interpretation of the x-rays is as stated below:    The X-rays of the lumbar region demonstrate degenerative changes. The degenerative changes seen include loss of vertebral body height and hypertrophic spurs.    Impression and Assessment    Low Back Pain.    Essential Hypertension.    Plan    Essential Hypertension Plan: The current plan was continued.    Low Back Pain Plan: We will obtain an MRI of the back. If the MRI is normal will set her up for PT with Kevin Padron.    Bharti was seen today for re-establish care.    Diagnoses and all orders for this visit:    Acute midline low back pain with bilateral sciatica  -     XR Spine Lumbar 2 or 3 View; Future  -     Comprehensive Metabolic Panel  -     Lipid Panel  -     MRI Lumbar Spine Without Contrast; Future    Essential hypertension  -     Comprehensive Metabolic Panel  -     Lipid Panel          Return to Office    The patient was instructed to return for follow-up in 3 weeks. Next Visit: Follow-up.    The patient was instructed to return sooner if the condition changes, worsens, or doesn't resolve.

## 2017-11-14 ENCOUNTER — HOSPITAL ENCOUNTER (OUTPATIENT)
Dept: MRI IMAGING | Facility: HOSPITAL | Age: 67
Discharge: HOME | End: 2017-11-14
Attending: INTERNAL MEDICINE | Admitting: INTERNAL MEDICINE

## 2017-11-14 DIAGNOSIS — M54.41 ACUTE MIDLINE LOW BACK PAIN WITH BILATERAL SCIATICA: ICD-10-CM

## 2017-11-14 DIAGNOSIS — M54.42 ACUTE MIDLINE LOW BACK PAIN WITH BILATERAL SCIATICA: ICD-10-CM

## 2017-11-14 PROCEDURE — 72148 MRI LUMBAR SPINE W/O DYE: CPT

## 2017-11-23 DIAGNOSIS — M51.9 LUMBAR DISC DISEASE: Primary | ICD-10-CM

## 2017-11-26 ENCOUNTER — TELEPHONE (OUTPATIENT)
Dept: INTERNAL MEDICINE | Facility: CLINIC | Age: 67
End: 2017-11-26

## 2017-11-29 ENCOUNTER — OFFICE VISIT (OUTPATIENT)
Dept: INTERNAL MEDICINE | Facility: CLINIC | Age: 67
End: 2017-11-29

## 2017-11-29 VITALS
DIASTOLIC BLOOD PRESSURE: 70 MMHG | BODY MASS INDEX: 41.17 KG/M2 | HEART RATE: 64 BPM | SYSTOLIC BLOOD PRESSURE: 128 MMHG | RESPIRATION RATE: 20 BRPM | TEMPERATURE: 98.6 F | WEIGHT: 197 LBS

## 2017-11-29 DIAGNOSIS — M25.50 ARTHRALGIA, UNSPECIFIED JOINT: ICD-10-CM

## 2017-11-29 DIAGNOSIS — Z23 NEED FOR INFLUENZA VACCINATION: ICD-10-CM

## 2017-11-29 DIAGNOSIS — M51.9 LUMBAR DISC DISEASE: Primary | ICD-10-CM

## 2017-11-29 DIAGNOSIS — I10 ESSENTIAL HYPERTENSION: ICD-10-CM

## 2017-11-29 PROCEDURE — 90662 IIV NO PRSV INCREASED AG IM: CPT | Performed by: INTERNAL MEDICINE

## 2017-11-29 PROCEDURE — 99213 OFFICE O/P EST LOW 20 MIN: CPT | Performed by: INTERNAL MEDICINE

## 2017-11-29 PROCEDURE — G0008 ADMIN INFLUENZA VIRUS VAC: HCPCS | Performed by: INTERNAL MEDICINE

## 2017-11-29 RX ORDER — GABAPENTIN 300 MG/1
300 CAPSULE ORAL NIGHTLY
Qty: 30 CAPSULE | Refills: 3 | Status: SHIPPED | OUTPATIENT
Start: 2017-11-29 | End: 2018-02-28

## 2017-11-29 RX ORDER — DICLOFENAC SODIUM 75 MG/1
75 TABLET, DELAYED RELEASE ORAL 2 TIMES DAILY PRN
Qty: 60 TABLET | Refills: 5 | Status: SHIPPED | OUTPATIENT
Start: 2017-11-29 | End: 2017-11-30

## 2017-11-29 RX ORDER — LOSARTAN POTASSIUM AND HYDROCHLOROTHIAZIDE 12.5; 5 MG/1; MG/1
1 TABLET ORAL DAILY
Qty: 30 TABLET | Refills: 5 | Status: SHIPPED | OUTPATIENT
Start: 2017-11-29 | End: 2022-06-09

## 2017-11-30 ENCOUNTER — OFFICE VISIT (OUTPATIENT)
Dept: NEUROSURGERY | Facility: CLINIC | Age: 67
End: 2017-11-30

## 2017-11-30 VITALS
BODY MASS INDEX: 41.14 KG/M2 | WEIGHT: 196 LBS | TEMPERATURE: 97.1 F | SYSTOLIC BLOOD PRESSURE: 144 MMHG | HEIGHT: 58 IN | DIASTOLIC BLOOD PRESSURE: 76 MMHG

## 2017-11-30 DIAGNOSIS — M51.36 DEGENERATIVE DISC DISEASE, LUMBAR: Primary | ICD-10-CM

## 2017-11-30 PROCEDURE — 99213 OFFICE O/P EST LOW 20 MIN: CPT | Performed by: NEUROLOGICAL SURGERY

## 2017-11-30 RX ORDER — MELOXICAM 7.5 MG/1
7.5 TABLET ORAL 2 TIMES DAILY
Qty: 60 TABLET | Refills: 0 | Status: SHIPPED | OUTPATIENT
Start: 2017-11-30 | End: 2018-02-28

## 2017-11-30 RX ORDER — CARISOPRODOL 350 MG/1
350 TABLET ORAL NIGHTLY
Qty: 30 TABLET | Refills: 0 | Status: SHIPPED | OUTPATIENT
Start: 2017-11-30 | End: 2018-02-28

## 2017-11-30 NOTE — PROGRESS NOTES
Bharti Haines  1950  [id number]                       CURRENT WORKING DIAGNOSIS:  [ Degenerative osteoarthritis of the lumbar spine]         MEDICAL HISTORY SINCE LAST ENCOUNTER:  [She reports for follow-up from her initial encounter with a lumbar MRI she had intermittent pain in her right leg.  It is somewhat ill-defined and although it is radicular does not confined itself to an isolated dermatomal pattern.  She previously had discectomy at L4 5 on the left side.  Her pain at this time is on the right.  She is been told she needs to have a knee replacement and walks with support as a consequence of her bad knee.  She has been to physical therapy which did not help.  She takes Voltaren which is difficult because it upsets her stomach. ]           Past Medical History:   Diagnosis Date   • Allergic    • Arthritis    • Breast cancer 2008    right   • Cancer     breast   • Cataract    • History of breast cancer    • Hx of radiation therapy 2008    right breast   • Hyperlipidemia    • Hypertension    • Osteoarthritis    • Peptic ulcer    • Sciatica               Past Surgical History:   Procedure Laterality Date   • BACK SURGERY     • BREAST BIOPSY Right 2008    US bx   • BREAST LUMPECTOMY Right 2008   • BREAST SURGERY     • CARPAL TUNNEL RELEASE Bilateral    •  SECTION     • REPLACEMENT TOTAL KNEE                Family History   Problem Relation Age of Onset   • Cancer Mother    • Breast cancer Mother      pt states 60's   • Stroke Mother    • Heart disease Father    • Stroke Father    • Diabetes Sister    • Hypertension Sister    • Osteoarthritis Sister    • Osteoporosis Sister    • Arthritis Other    • Breast cancer Maternal Aunt      pt states 60's   • Ovarian cancer Neg Hx               Social History     Social History   • Marital status:      Spouse name: N/A   • Number of children: N/A   • Years of education: N/A     Occupational History   • Not on file.     Social History  Main Topics   • Smoking status: Never Smoker   • Smokeless tobacco: Never Used   • Alcohol use No   • Drug use: No   • Sexual activity: Defer     Other Topics Concern   • Not on file     Social History Narrative              Allergies   Allergen Reactions   • Cefdinir Rash   • Penicillins Rash              Current Outpatient Prescriptions:   •  aspirin tablet, Take 81 mg by mouth daily., Disp: , Rfl:   •  diclofenac (VOLTAREN) 75 MG EC tablet, Take 1 tablet by mouth 2 (Two) Times a Day As Needed (pain)., Disp: 60 tablet, Rfl: 5  •  gabapentin (NEURONTIN) 300 MG capsule, Take 1 capsule by mouth Every Night., Disp: 30 capsule, Rfl: 3  •  losartan-hydrochlorothiazide (HYZAAR) 50-12.5 MG per tablet, Take 1 tablet by mouth Daily., Disp: 30 tablet, Rfl: 5         Review of Systems   Constitutional: Negative for activity change, appetite change, chills, diaphoresis, fatigue, fever and unexpected weight change.   HENT: Negative for congestion, dental problem, drooling, ear discharge, ear pain, facial swelling, hearing loss, mouth sores, nosebleeds, postnasal drip, rhinorrhea, sinus pressure, sneezing, sore throat, tinnitus, trouble swallowing and voice change.    Eyes: Negative for photophobia, pain, discharge, redness, itching and visual disturbance.   Respiratory: Negative for apnea, cough, choking, chest tightness, shortness of breath, wheezing and stridor.    Cardiovascular: Negative for chest pain, palpitations and leg swelling.   Gastrointestinal: Negative for abdominal distention, abdominal pain, anal bleeding, blood in stool, constipation, diarrhea, nausea, rectal pain and vomiting.   Endocrine: Negative for cold intolerance, heat intolerance, polydipsia, polyphagia and polyuria.   Genitourinary: Negative for decreased urine volume, difficulty urinating, dysuria, enuresis, flank pain, frequency, genital sores, hematuria and urgency.   Musculoskeletal: Negative for arthralgias, back pain, gait problem, joint  "swelling, myalgias, neck pain and neck stiffness.   Skin: Negative for color change, pallor, rash and wound.   Allergic/Immunologic: Negative for environmental allergies, food allergies and immunocompromised state.   Neurological: Negative for dizziness, tremors, seizures, syncope, facial asymmetry, speech difficulty, weakness, light-headedness, numbness and headaches.   Hematological: Negative for adenopathy. Does not bruise/bleed easily.   Psychiatric/Behavioral: Negative for agitation, behavioral problems, confusion, decreased concentration, dysphoric mood, hallucinations, self-injury, sleep disturbance and suicidal ideas. The patient is not nervous/anxious and is not hyperactive.    All other systems reviewed and are negative.              Vitals:    11/30/17 1303   Height: 58\" (147.3 cm)               EXAMINATION: Straight leg raising [place] and flip test are all negative.  Her deep tendon reflexes are hypoactive.  I am unable to detect any weakness or sensory loss.            MEDICAL DECISION MAKING: Lumbar MRI shows significant degenerative disc disease L5-S1 with lateral recess closure more so on the right than the left.  She has significant degenerative disc disease as well between L1 and L2.  Postsurgical changes are seen at L4-L5           ASSESSMENT/DISPOSITION: Based on her examination and MRI I'm not sure that surgery has anything to offer.  She does have facet hypertrophy at L5-S1 on the right side however her symptoms are not quite typical and are intermittent.  For that reason I think a conservative approach is justifiable.  I have suggested epidural steroid injection.  I have given her a prescription of Mobic 7.5 mg twice a day and Soma 350 mg at night.  She is to call me after she has or epidural steroid injection.              I APPRECIATE THE OPPORTUNITY OF THIS REFERRAL. PLEASE CALL IF ANY QUESTIONS [phone]    Scribed for Eric Haley MD by Zulma Tineo CMA. 11/30/2017  1:31 PM "

## 2017-11-30 NOTE — PATIENT INSTRUCTIONS
call Dr. Haley on a Monday or Tuesday with an update.   Ask for Valarie,  and leave a message for  Dr. Haley.  He will call you back at the end of the day as soon as he can.       [phone]    Call after pain clinic

## 2017-11-30 NOTE — PROGRESS NOTES
Chief Complaint   Patient presents with   • Follow-up     3 WEEK FOLLOW UP CHRONIC MEDICAL PROBLEMS        History of Present Illness      The patient presents for follow-up of low back pain. She reports that the pain is stable. The current treatment consists of gabapentin.   She does not have associated fever, chills, weight loss, rashes, dysuria, hematuria, or a decreased urine stream. The pain does not radiate. There is no numbness. The patient denies loss of bladder control. The patient denies loss of bowel control. She is scheduled to see neurosurgery.    The patient presents for a follow-up related to hypertension. The patient reports that she has had no headaches, chest pain, dyspnea, edema, syncope, blurred vision or palpitations. She states that she is taking her medication as prescribed. She is not having medication side effects. She does not check her blood pressures at home.    Medications      Current Outpatient Prescriptions:   •  aspirin tablet, Take 81 mg by mouth daily., Disp: , Rfl:   •  diclofenac (VOLTAREN) 75 MG EC tablet, Take 1 tablet by mouth 2 (Two) Times a Day As Needed (pain)., Disp: 60 tablet, Rfl: 5  •  losartan-hydrochlorothiazide (HYZAAR) 50-12.5 MG per tablet, Take 1 tablet by mouth Daily., Disp: 30 tablet, Rfl: 5  •  gabapentin (NEURONTIN) 300 MG capsule, Take 1 capsule by mouth Every Night., Disp: 30 capsule, Rfl: 3     Allergies    Allergies   Allergen Reactions   • Cefdinir Rash   • Penicillins Rash       Problem List    Patient Active Problem List   Diagnosis   • Arthritis   • Hyperlipidemia   • Hyperglycemia   • Hypertension   • Morbid obesity   • Muscular aches   • Venous insufficiency   • Osteoarthritis   • Low back pain   • Arthralgia       Medications, Allergies, Problems List and Past History were reviewed and updated.    Physical Examination    /70 (BP Location: Right arm, Patient Position: Sitting, Cuff Size: Adult)  Pulse 64  Temp 98.6 °F (37 °C) (Temporal Artery  )   Resp 20  Wt 197 lb (89.4 kg)  LMP  (LMP Unknown) Comment: LAST PAP 2015 BY DR FIELD  BMI 41.17 kg/m2    Neck: Thyroid- non enlarged, symmetric and has no nodules. No bruits are detected. ROM- Normal Range of Motion with no rigidity.    Lungs: Auscultation- Clear to auscultation bilaterally. There are no retractions, clubbing or cyanosis. The Expiratory to Inspiratory ratio is equal.    Cardiovascular: There are no carotid bruits. Heart- Normal Rate with Regular rhythm and no murmurs. There are no gallops. There are no rubs. In the lower extremities there is no edema. The upper extremities do not have edema.    Abdomen: Soft, benign, non-tender with no masses, hernias, organomegaly or scars.    Back: The patient has a normal spinal curvature with no evidence of scoliosis. There are no skin lesions noted on the back. Palpation reveals no tenderness and normal muscle tone. The straight leg raising test is negative. The back has normal flexion, extension, rotation, and lateral bending.    Impression and Assessment    Low Back Pain.    Essential Hypertension.    Plan    Essential Hypertension Plan: The current plan was continued.    Low Back Pain Plan: The current plan was continued.    Bharti was seen today for follow-up.    Diagnoses and all orders for this visit:    Lumbar disc disease  -     diclofenac (VOLTAREN) 75 MG EC tablet; Take 1 tablet by mouth 2 (Two) Times a Day As Needed (pain).  -     gabapentin (NEURONTIN) 300 MG capsule; Take 1 capsule by mouth Every Night.    Essential hypertension  -     losartan-hydrochlorothiazide (HYZAAR) 50-12.5 MG per tablet; Take 1 tablet by mouth Daily.    Arthralgia, unspecified joint    Need for influenza vaccination  -     Flu Vaccine High Dose PF 65YR+        Return to Office    The patient other.    The patient was instructed to return sooner if the condition changes, worsens, or doesn't resolve.

## 2017-12-12 ENCOUNTER — TELEPHONE (OUTPATIENT)
Dept: INTERNAL MEDICINE | Facility: CLINIC | Age: 67
End: 2017-12-12

## 2017-12-12 DIAGNOSIS — Z23 IMMUNIZATION DUE: Primary | ICD-10-CM

## 2017-12-12 NOTE — TELEPHONE ENCOUNTER
----- Message from Yoko Garcia sent at 12/12/2017 11:47 AM EST -----  [phone]  PT WOULD LIKE TO GET HER PNEUMONIA SHOT CAN WE PUT ORDER IN? CALL PT TO DISCUSS  ALSO, PT IS GETTING CORTISONE SHOTS IN HER BACK ON 12/20/ WILL THIS AFFECT THE PNEUMONIA SHOT?

## 2017-12-12 NOTE — TELEPHONE ENCOUNTER
S/W PT, INFORMED THAT ORDER ENTERED FOR HER PNEUMONIA VACCINE AND TO CALL THE DAY OF OR BEFORE THAT SHE WANTS TO COME IN TO GET  SCHEDULED FOR NURSE WALK IN APPT.  EXPL THESE ARE USUALLY DONE 2-4PM MON-FRI.  VERB GOOD UNDERSTANDING AND APPREC.

## 2018-02-28 ENCOUNTER — OFFICE VISIT (OUTPATIENT)
Dept: INTERNAL MEDICINE | Facility: CLINIC | Age: 68
End: 2018-02-28

## 2018-02-28 VITALS
BODY MASS INDEX: 40.34 KG/M2 | SYSTOLIC BLOOD PRESSURE: 132 MMHG | TEMPERATURE: 98 F | RESPIRATION RATE: 18 BRPM | WEIGHT: 193 LBS | DIASTOLIC BLOOD PRESSURE: 80 MMHG | HEART RATE: 64 BPM

## 2018-02-28 DIAGNOSIS — Z00.00 HEALTHCARE MAINTENANCE: ICD-10-CM

## 2018-02-28 DIAGNOSIS — I10 ESSENTIAL HYPERTENSION: Primary | ICD-10-CM

## 2018-02-28 DIAGNOSIS — Z23 NEED FOR PNEUMOCOCCAL VACCINE: ICD-10-CM

## 2018-02-28 DIAGNOSIS — E78.5 HYPERLIPIDEMIA, UNSPECIFIED HYPERLIPIDEMIA TYPE: ICD-10-CM

## 2018-02-28 LAB
ALBUMIN SERPL-MCNC: 4.4 G/DL (ref 3.2–4.8)
ALBUMIN/GLOB SERPL: 1.7 G/DL (ref 1.5–2.5)
ALP SERPL-CCNC: 84 U/L (ref 25–100)
ALT SERPL W P-5'-P-CCNC: 27 U/L (ref 7–40)
ANION GAP SERPL CALCULATED.3IONS-SCNC: 9 MMOL/L (ref 3–11)
ARTICHOKE IGE QN: 170 MG/DL (ref 0–130)
AST SERPL-CCNC: 28 U/L (ref 0–33)
BILIRUB SERPL-MCNC: 1 MG/DL (ref 0.3–1.2)
BUN BLD-MCNC: 12 MG/DL (ref 9–23)
BUN/CREAT SERPL: 15 (ref 7–25)
CALCIUM SPEC-SCNC: 9.6 MG/DL (ref 8.7–10.4)
CHLORIDE SERPL-SCNC: 99 MMOL/L (ref 99–109)
CHOLEST SERPL-MCNC: 228 MG/DL (ref 0–200)
CO2 SERPL-SCNC: 29 MMOL/L (ref 20–31)
CREAT BLD-MCNC: 0.8 MG/DL (ref 0.6–1.3)
GFR SERPL CREATININE-BSD FRML MDRD: 71 ML/MIN/1.73
GLOBULIN UR ELPH-MCNC: 2.6 GM/DL
GLUCOSE BLD-MCNC: 95 MG/DL (ref 70–100)
HCV AB SER DONR QL: NORMAL
HDLC SERPL-MCNC: 50 MG/DL (ref 40–60)
POTASSIUM BLD-SCNC: 4.2 MMOL/L (ref 3.5–5.5)
PROT SERPL-MCNC: 7 G/DL (ref 5.7–8.2)
SODIUM BLD-SCNC: 137 MMOL/L (ref 132–146)
TRIGL SERPL-MCNC: 190 MG/DL (ref 0–150)

## 2018-02-28 PROCEDURE — 90670 PCV13 VACCINE IM: CPT | Performed by: INTERNAL MEDICINE

## 2018-02-28 PROCEDURE — G0009 ADMIN PNEUMOCOCCAL VACCINE: HCPCS | Performed by: INTERNAL MEDICINE

## 2018-02-28 PROCEDURE — 80053 COMPREHEN METABOLIC PANEL: CPT | Performed by: INTERNAL MEDICINE

## 2018-02-28 PROCEDURE — 36415 COLL VENOUS BLD VENIPUNCTURE: CPT | Performed by: INTERNAL MEDICINE

## 2018-02-28 PROCEDURE — 80061 LIPID PANEL: CPT | Performed by: INTERNAL MEDICINE

## 2018-02-28 PROCEDURE — 99213 OFFICE O/P EST LOW 20 MIN: CPT | Performed by: INTERNAL MEDICINE

## 2018-02-28 PROCEDURE — 86803 HEPATITIS C AB TEST: CPT | Performed by: INTERNAL MEDICINE

## 2018-02-28 NOTE — PROGRESS NOTES
Chief Complaint   Patient presents with   • Follow-up     3 MONTH FOLLOW UP CHRONIC MEDICAL PROBLEMS       History of Present Illness      The patient presents for a follow-up related to hyperlipidemia. She is following a low fat diet. She reports that she is not exercising. She stopped taking her lipid medication due to side effects. She was taking atorvastatin, pravastatin and simvastatin and stopped due to muscle cramps. She denies chest pain, shortness of breath, orthopnea, paroxysmal nocturnal dyspnea, dyspnea on exertion, edema, palpitations or syncope.    The patient presents for a follow-up related to hypertension. The patient reports that she has had no headaches or blurred vision. She states that she is taking her medication as prescribed. She is not having medication side effects. She does not check her blood pressures at home.    Review of Systems    GENERAL- Denies Unexplained Weight Loss, Fever, Chills, Sweats, Fatigue, Weakness or Malaise.    CARDIOVASCULAR- Denies Claudication.    GASTROINTESTINAL- Denies Abdominal Pain, Nausea, Vomiting, Diarrhea, Blood per Rectum, Constipation or Heartburn.    PULMONARY- Denies Wheezing, Sputum Production, Cough, Hemoptysis or Pleuritic Chest Pain.    Medications      Current Outpatient Prescriptions:   •  aspirin tablet, Take 81 mg by mouth daily., Disp: , Rfl:   •  losartan-hydrochlorothiazide (HYZAAR) 50-12.5 MG per tablet, Take 1 tablet by mouth Daily., Disp: 30 tablet, Rfl: 5     Allergies    Allergies   Allergen Reactions   • Cefdinir Rash   • Penicillins Rash       Problem List    Patient Active Problem List   Diagnosis   • Arthritis   • Hyperlipidemia   • Hyperglycemia   • Hypertension   • Morbid obesity   • Muscular aches   • Venous insufficiency   • Osteoarthritis   • Low back pain   • Arthralgia       Medications, Allergies, Problems List and Past History were reviewed and updated.    Physical Examination    /80 (BP Location: Right arm, Patient  Position: Sitting, Cuff Size: Adult)  Pulse 64  Temp 98 °F (36.7 °C) (Temporal Artery )   Resp 18  Wt 87.5 kg (193 lb)  LMP  (LMP Unknown) Comment: LAST PAP 2015 BY DR FIELD  BMI 40.34 kg/m2    HEENT: Head- Normocephalic Atraumatic. Facies- Within normal limits. Pinnas- Normal texture and shape bilaterally. Canals- Normal bilaterally. TMs- Normal bilaterally. Nares- Patent bilaterally. Nasal Septum- is normal. There is no tenderness to palpation over the frontal or maxillary sinuses. Lids- Normal bilaterally. Conjunctiva- Clear bilaterally. Sclera- Anicteric bilaterally. Oropharynx- Moist with no lesions. Tonsils- No enlargement, erythema or exudate.    Neck: Thyroid- non enlarged, symmetric and has no nodules. No bruits are detected. ROM- Normal Range of Motion with no rigidity.    Lungs: Auscultation- Clear to auscultation bilaterally. There are no retractions, clubbing or cyanosis. The Expiratory to Inspiratory ratio is equal.    Cardiovascular: There are no carotid bruits. Heart- Normal Rate with Regular rhythm and no murmurs. There are no gallops. There are no rubs. In the lower extremities there is no edema. The upper extremities do not have edema.    Abdomen: Soft, benign, non-tender with no masses, hernias, organomegaly or scars.    Impression and Assessment    Encounter for Immunization Administration.    Hyperlipidemia.    Essential Hypertension.    Plan    Hyperlipidemia Plan: She was instructed to eat a low fat diet. She was encouraged to exercise daily. Weight loss was encouraged.    Essential Hypertension Plan: The current plan was continued.    Counseled regarding immunizations and applicable VIS given.    Immunizations Ordered and Administered: Prevnar 13.    The following was ordered for screening and health maintenance: Hepatitis C Antibody.    Bharti was seen today for follow-up.    Diagnoses and all orders for this visit:    Essential hypertension  -     Comprehensive Metabolic  Panel    Hyperlipidemia, unspecified hyperlipidemia type  -     Comprehensive Metabolic Panel  -     Lipid Panel    Need for pneumococcal vaccine  -     Pneumococcal Conjugate Vaccine 13-Valent All (PCV13)    Healthcare maintenance  -     Hepatitis C Antibody        Return to Office    The patient was instructed to return for follow-up in 6 months. Next Visit: Follow-up.    The patient was instructed to return sooner if the condition changes, worsens, or doesn't resolve.

## 2018-03-27 ENCOUNTER — TRANSCRIBE ORDERS (OUTPATIENT)
Dept: ADMINISTRATIVE | Facility: HOSPITAL | Age: 68
End: 2018-03-27

## 2018-03-27 DIAGNOSIS — Z12.31 VISIT FOR SCREENING MAMMOGRAM: Primary | ICD-10-CM

## 2018-05-08 ENCOUNTER — HOSPITAL ENCOUNTER (OUTPATIENT)
Dept: MAMMOGRAPHY | Facility: HOSPITAL | Age: 68
Discharge: HOME | End: 2018-05-08
Attending: INTERNAL MEDICINE | Admitting: INTERNAL MEDICINE

## 2018-05-08 DIAGNOSIS — Z12.31 VISIT FOR SCREENING MAMMOGRAM: ICD-10-CM

## 2018-05-08 PROCEDURE — 77067 SCR MAMMO BI INCL CAD: CPT | Performed by: RADIOLOGY

## 2018-05-08 PROCEDURE — 77063 BREAST TOMOSYNTHESIS BI: CPT | Performed by: RADIOLOGY

## 2018-05-08 PROCEDURE — 77067 SCR MAMMO BI INCL CAD: CPT

## 2018-05-08 PROCEDURE — 77063 BREAST TOMOSYNTHESIS BI: CPT

## 2018-07-08 ENCOUNTER — OFFICE VISIT (OUTPATIENT)
Dept: FAMILY MEDICINE CLINIC | Facility: CLINIC | Age: 68
End: 2018-07-08

## 2018-07-08 VITALS
OXYGEN SATURATION: 98 % | HEIGHT: 58 IN | DIASTOLIC BLOOD PRESSURE: 68 MMHG | SYSTOLIC BLOOD PRESSURE: 126 MMHG | HEART RATE: 82 BPM | TEMPERATURE: 97.9 F

## 2018-07-08 DIAGNOSIS — R39.9 UTI SYMPTOMS: Primary | ICD-10-CM

## 2018-07-08 LAB
BILIRUB BLD-MCNC: NEGATIVE MG/DL
CLARITY, POC: CLEAR
COLOR UR: YELLOW
EXPIRATION DATE: ABNORMAL
GLUCOSE UR STRIP-MCNC: NEGATIVE MG/DL
KETONES UR QL: NEGATIVE
LEUKOCYTE EST, POC: NEGATIVE
Lab: ABNORMAL
NITRITE UR-MCNC: NEGATIVE MG/ML
PH UR: 7 [PH] (ref 5–8)
PROT UR STRIP-MCNC: NEGATIVE MG/DL
RBC # UR STRIP: ABNORMAL /UL
SP GR UR: 1.01 (ref 1–1.03)
UROBILINOGEN UR QL: NORMAL

## 2018-07-08 PROCEDURE — 99213 OFFICE O/P EST LOW 20 MIN: CPT | Performed by: NURSE PRACTITIONER

## 2018-07-08 PROCEDURE — 81003 URINALYSIS AUTO W/O SCOPE: CPT | Performed by: NURSE PRACTITIONER

## 2018-07-08 RX ORDER — ONDANSETRON HYDROCHLORIDE 8 MG/1
TABLET, FILM COATED ORAL
COMMUNITY
Start: 2018-07-06 | End: 2018-09-13

## 2018-07-08 RX ORDER — CLINDAMYCIN HYDROCHLORIDE 300 MG/1
300 CAPSULE ORAL 2 TIMES DAILY
COMMUNITY
End: 2018-09-13

## 2018-07-08 RX ORDER — HYDROCODONE BITARTRATE AND ACETAMINOPHEN 7.5; 325 MG/1; MG/1
TABLET ORAL
COMMUNITY
Start: 2018-06-29 | End: 2018-09-13

## 2018-07-08 NOTE — PATIENT INSTRUCTIONS
Overactive Bladder, Adult  Overactive bladder is a group of urinary symptoms. With overactive bladder, you may suddenly feel the need to pass urine (urinate) right away. After feeling this sudden urge, you might also leak urine if you cannot get to the bathroom fast enough (urinary incontinence). These symptoms might interfere with your daily work or social activities. Overactive bladder symptoms may also wake you up at night.  Overactive bladder affects the nerve signals between your bladder and your brain. Your bladder may get the signal to empty before it is full. Very sensitive muscles can also make your bladder squeeze too soon.  What are the causes?  Many things can cause an overactive bladder. Possible causes include:  · Urinary tract infection.  · Infection of nearby tissues, such as the prostate.  · Prostate enlargement.  · Being pregnant with twins or more (multiples).  · Surgery on the uterus or urethra.  · Bladder stones, inflammation, or tumors.  · Drinking too much caffeine or alcohol.  · Certain medicines, especially those that you take to help your body get rid of extra fluid (diuretics) by increasing urine production.  · Muscle or nerve weakness, especially from:  ? A spinal cord injury.  ? Stroke.  ? Multiple sclerosis.  ? Parkinson disease.  · Diabetes. This can cause a high urine volume that fills the bladder so quickly that the normal urge to urinate is triggered very strongly.  · Constipation. A buildup of too much stool can put pressure on your bladder.    What increases the risk?  You may be at greater risk for overactive bladder if you:  · Are an older adult.  · Smoke.  · Are going through menopause.  · Have prostate problems.  · Have a neurological disease, such as stroke, dementia, Parkinson disease, or multiple sclerosis (MS).  · Eat or drink things that irritate the bladder. These include alcohol, spicy food, and caffeine.  · Are overweight or obese.    What are the signs or  symptoms?  The signs and symptoms of an overactive bladder include:  · Sudden, strong urges to urinate.  · Leaking urine.  · Urinating eight or more times per day.  · Waking up to urinate two or more times per night.    How is this diagnosed?  Your health care provider may suspect overactive bladder based on your symptoms. The health care provider will do a physical exam and take your medical history. Blood or urine tests may also be done. For example, you might need to have a bladder function test to check how well you can hold your urine. You might also need to see a health care provider who specializes in the urinary tract (urologist).  How is this treated?  Treatment for overactive bladder depends on the cause of your condition and whether it is mild or severe. Certain treatments can be done in your health care provider's office or clinic. You can also make lifestyle changes at home. Options include:  Behavioral Treatments  · Biofeedback. A specialist uses sensors to help you become aware of your body’s signals.  · Keeping a daily log of when you need to urinate and what happens after the urge. This may help you manage your condition.  · Bladder training. This helps you learn to control the urge to urinate by following a schedule that directs you to urinate at regular intervals (timed voiding). At first, you might have to wait a few minutes after feeling the urge. In time, you should be able to schedule bathroom visits an hour or more apart.  · Kegel exercises. These are exercises to strengthen the pelvic floor muscles, which support the bladder. Toning these muscles can help you control urination, even if your bladder muscles are overactive. A specialist will teach you how to do these exercises correctly. They require daily practice.  · Weight loss. If you are obese or overweight, losing weight might relieve your symptoms of overactive bladder. Talk to your health care provider about losing weight and whether  there is a specific program or method that would work best for you.  · Diet change. This might help if constipation is making your overactive bladder worse. Your health care provider or a dietitian can explain ways to change what you eat to ease constipation. You might also need to consume less alcohol and caffeine or drink other fluids at different times of the day.  · Stopping smoking.  · Wearing pads to absorb leakage while you wait for other treatments to take effect.  Physical Treatments  · Electrical stimulation. Electrodes send gentle pulses of electricity to strengthen the nerves or muscles that help to control the bladder. Sometimes, the electrodes are placed outside of the body. In other cases, they might be placed inside the body (implanted). This treatment can take several months to have an effect.  · Supportive devices. Women may need a plastic device that fits into the vagina and supports the bladder (pessary).  Medicines  Several medicines can help treat overactive bladder and are usually used along with other treatments. Some are injected into the muscles involved in urination. Others come in pill form. Your health care provider may prescribe:  · Antispasmodics. These medicines block the signals that the nerves send to the bladder. This keeps the bladder from releasing urine at the wrong time.  · Tricyclic antidepressants. These types of antidepressants also relax bladder muscles.    Surgery  · You may have a device implanted to help manage the nerve signals that indicate when you need to urinate.  · You may have surgery to implant electrodes for electrical stimulation.  · Sometimes, very severe cases of overactive bladder require surgery to change the shape of the bladder.  Follow these instructions at home:  · Take medicines only as directed by your health care provider.  · Use any implants or a pessary as directed by your health care provider.  · Make any diet or lifestyle changes that are  recommended by your health care provider. These might include:  ? Drinking less fluid or drinking at different times of the day. If you need to urinate often during the night, you may need to stop drinking fluids early in the evening.  ? Cutting down on caffeine or alcohol. Both can make an overactive bladder worse. Caffeine is found in coffee, tea, and sodas.  ? Doing Kegel exercises to strengthen muscles.  ? Losing weight if you need to.  ? Eating a healthy and balanced diet to prevent constipation.  · Keep a journal or log to track how much and when you drink and also when you feel the need to urinate. This will help your health care provider to monitor your condition.  Contact a health care provider if:  · Your symptoms do not get better after treatment.  · Your pain and discomfort are getting worse.  · You have more frequent urges to urinate.  · You have a fever.  Get help right away if:  You are not able to control your bladder at all.  This information is not intended to replace advice given to you by your health care provider. Make sure you discuss any questions you have with your health care provider.  Document Released: 10/14/2010 Document Revised: 05/25/2017 Document Reviewed: 05/13/2015  [employer] Interactive Patient Education © 2018 [employer]

## 2018-07-08 NOTE — PROGRESS NOTES
"Subjective   Bharti Haines is a 68 y.o. female.   Chief Complaint   Patient presents with   • Urinary Urgency    Walk in     Urinary Tract Infection    This is a new problem. The current episode started in the past 7 days. The problem has been gradually worsening. The quality of the pain is described as aching. The pain is at a severity of 2/10. The pain is mild. There has been no fever. Associated symptoms include frequency and urgency. Pertinent negatives include no chills, discharge, flank pain, hematuria, hesitancy, nausea, possible pregnancy, sweats or vomiting. She has tried nothing for the symptoms. The treatment provided no relief.        The following portions of the patient's history were reviewed and updated as appropriate: allergies, current medications, past family history, past medical history, past social history, past surgical history and problem list.    Review of Systems   Constitutional: Negative.  Negative for chills.   Respiratory: Negative for cough, shortness of breath and wheezing.    Cardiovascular: Negative for chest pain.   Gastrointestinal: Negative for nausea and vomiting.   Genitourinary: Positive for frequency and urgency. Negative for flank pain, hematuria and hesitancy.        Pressure    Musculoskeletal: Negative.    Skin: Negative.    Neurological: Negative.        Objective   Allergies   Allergen Reactions   • Cefdinir Rash   • Penicillins Rash     Visit Vitals  /68   Pulse 82   Temp 97.9 °F (36.6 °C)   Ht 147.3 cm (58\")   LMP  (LMP Unknown) Comment: LAST PAP 2015 BY DR FIELD   SpO2 98%       Physical Exam   Constitutional: She is oriented to person, place, and time. She appears well-developed and well-nourished.   Cardiovascular: Normal rate.    Pulmonary/Chest: Effort normal.   Abdominal: There is no tenderness. There is no CVA tenderness.   Neurological: She is alert and oriented to person, place, and time.   Skin: Skin is warm and dry. Capillary refill takes less " than 2 seconds.   Psychiatric: She has a normal mood and affect. Her behavior is normal.       Assessment/Plan   Bharti was seen today for urinary urgency.    Diagnoses and all orders for this visit:    UTI symptoms  -     POCT urinalysis dipstick, automated    POCT urinalysis is negative for leukocytes and nitrites. Revealed a trace of blood.   Patient reports she had recent knee surgery on 06/26/2018. Since the surgery is having urinary pressure.   Has always had frequency. She is currently on a blood thinner lovenox, clindamycin, and taking Norco tablets for pain. She reports she did not have a catheter during the surgery.   She denies constipation.   She has been encouraged to follow up with Dr. Guerrero for OAB symptoms.   Increase water intake - avoid caffeine  See overactive bladder instructions.          Noemi Kiran, APRN

## 2018-07-10 ENCOUNTER — TELEPHONE (OUTPATIENT)
Dept: INTERNAL MEDICINE | Facility: CLINIC | Age: 68
End: 2018-07-10

## 2018-07-10 NOTE — TELEPHONE ENCOUNTER
RX SENT VIA [facility].    PT NOTIFIED OF RX SENT WITH DIRECTIONS GIVEN.  VERB GOOD UNDERSTANDING AND APPREC.

## 2018-07-10 NOTE — TELEPHONE ENCOUNTER
----- Message from Clarita Brooks sent at 7/10/2018 12:18 PM EDT -----  Contact: SELF  VANNESSA RENTERIA CALLING TO SEE IF SHE CAN GET SOMETHING CALLED IN FOR INTERNAL HEMORRHOIDS. SHE HAS BEEN USING LIDOCAINE BUT IT SEEMS TO MAKE HER HEART BEAT FAST AND IS NOT HELPING.  SHE USES THE [facility] [address] . VANNESSA CAN BE REACHED [phone]

## 2018-09-05 ENCOUNTER — TELEPHONE (OUTPATIENT)
Dept: INTERNAL MEDICINE | Facility: CLINIC | Age: 68
End: 2018-09-05

## 2018-09-05 NOTE — TELEPHONE ENCOUNTER
S/W PT, INFORMED THAT WE DID GET MAMM REPORT AND IT WAS OKAY BUT THAT WE HAVE NOT GOTTEN OVARIAN SCREENING REPORT YET BUT WAS LESS THAT WEEK AGO AND TAKES A LITTLE WHILE TO GET.  VERB GOOD UNDERSTANDING AND APPREC.

## 2018-09-05 NOTE — TELEPHONE ENCOUNTER
----- Message from Linda Anthony sent at 9/4/2018  4:04 PM EDT -----  [phone]    PT SAID SHE HAD A MAMMOGRAM ON 5/8 AND NEVER GOT RESULTS-HAVE YOU GOTTEN THEM?    AND ALSO HAD OVARIAN CANCER SCREENING AT [facility] ON 8/30-HAVE YOU GOTTEN THOSE RESULTS?    SAYS YOU CAN LEAVE A MESSAGE ON NUMBER ABOVE

## 2018-09-12 ENCOUNTER — TELEPHONE (OUTPATIENT)
Dept: INTERNAL MEDICINE | Facility: CLINIC | Age: 68
End: 2018-09-12

## 2018-09-12 NOTE — TELEPHONE ENCOUNTER
----- Message from Kelsi Shannon sent at 9/12/2018 10:41 AM EDT -----  PATIENT CALLED STATING SHE IS HAVING STOMACH PROBLEMS, AND WOULD LIKE TO KNOW IF SHE CAN BE TESTED H-PYLORI?    PATIENT CONTACT: [phone]    THANK YOU.

## 2018-09-13 ENCOUNTER — HOSPITAL ENCOUNTER (OUTPATIENT)
Dept: GENERAL RADIOLOGY | Facility: HOSPITAL | Age: 68
Discharge: HOME | End: 2018-09-13
Attending: INTERNAL MEDICINE | Admitting: INTERNAL MEDICINE

## 2018-09-13 ENCOUNTER — OFFICE VISIT (OUTPATIENT)
Dept: INTERNAL MEDICINE | Facility: CLINIC | Age: 68
End: 2018-09-13

## 2018-09-13 VITALS
TEMPERATURE: 98.9 F | WEIGHT: 168 LBS | DIASTOLIC BLOOD PRESSURE: 72 MMHG | BODY MASS INDEX: 35.11 KG/M2 | HEART RATE: 60 BPM | RESPIRATION RATE: 18 BRPM | SYSTOLIC BLOOD PRESSURE: 136 MMHG

## 2018-09-13 DIAGNOSIS — R10.84 GENERALIZED ABDOMINAL PAIN: ICD-10-CM

## 2018-09-13 DIAGNOSIS — K59.00 CONSTIPATION, UNSPECIFIED CONSTIPATION TYPE: ICD-10-CM

## 2018-09-13 DIAGNOSIS — R10.84 GENERALIZED ABDOMINAL PAIN: Primary | ICD-10-CM

## 2018-09-13 DIAGNOSIS — N39.0 URINARY TRACT INFECTION WITHOUT HEMATURIA, SITE UNSPECIFIED: ICD-10-CM

## 2018-09-13 LAB
ALBUMIN SERPL-MCNC: 4.41 G/DL (ref 3.2–4.8)
ALBUMIN/GLOB SERPL: 1.9 G/DL (ref 1.5–2.5)
ALP SERPL-CCNC: 74 U/L (ref 25–100)
ALT SERPL W P-5'-P-CCNC: 27 U/L (ref 7–40)
ANION GAP SERPL CALCULATED.3IONS-SCNC: 14 MMOL/L (ref 3–11)
AST SERPL-CCNC: 29 U/L (ref 0–33)
BASOPHILS # BLD AUTO: 0.02 10*3/MM3 (ref 0–0.2)
BASOPHILS NFR BLD AUTO: 0.2 % (ref 0–1)
BILIRUB BLD-MCNC: NEGATIVE MG/DL
BILIRUB SERPL-MCNC: 1.4 MG/DL (ref 0.3–1.2)
BUN BLD-MCNC: 9 MG/DL (ref 9–23)
BUN/CREAT SERPL: 13.2 (ref 7–25)
CALCIUM SPEC-SCNC: 9.7 MG/DL (ref 8.7–10.4)
CHLORIDE SERPL-SCNC: 97 MMOL/L (ref 99–109)
CLARITY, POC: CLEAR
CO2 SERPL-SCNC: 25 MMOL/L (ref 20–31)
COLOR UR: YELLOW
CREAT BLD-MCNC: 0.68 MG/DL (ref 0.6–1.3)
DEPRECATED RDW RBC AUTO: 42.8 FL (ref 37–54)
EOSINOPHIL # BLD AUTO: 0.03 10*3/MM3 (ref 0–0.3)
EOSINOPHIL NFR BLD AUTO: 0.4 % (ref 0–3)
ERYTHROCYTE [DISTWIDTH] IN BLOOD BY AUTOMATED COUNT: 13.9 % (ref 11.3–14.5)
EXPIRATION DATE: ABNORMAL
GFR SERPL CREATININE-BSD FRML MDRD: 86 ML/MIN/1.73
GLOBULIN UR ELPH-MCNC: 2.3 GM/DL
GLUCOSE BLD-MCNC: 90 MG/DL (ref 70–100)
GLUCOSE UR STRIP-MCNC: NEGATIVE MG/DL
HCT VFR BLD AUTO: 39.8 % (ref 34.5–44)
HGB BLD-MCNC: 13.1 G/DL (ref 11.5–15.5)
IMM GRANULOCYTES # BLD: 0.02 10*3/MM3 (ref 0–0.03)
IMM GRANULOCYTES NFR BLD: 0.2 % (ref 0–0.6)
KETONES UR QL: ABNORMAL
LEUKOCYTE EST, POC: ABNORMAL
LYMPHOCYTES # BLD AUTO: 1.27 10*3/MM3 (ref 0.6–4.8)
LYMPHOCYTES NFR BLD AUTO: 15.2 % (ref 24–44)
Lab: ABNORMAL
MCH RBC QN AUTO: 28.2 PG (ref 27–31)
MCHC RBC AUTO-ENTMCNC: 32.9 G/DL (ref 32–36)
MCV RBC AUTO: 85.6 FL (ref 80–99)
MONOCYTES # BLD AUTO: 0.67 10*3/MM3 (ref 0–1)
MONOCYTES NFR BLD AUTO: 8 % (ref 0–12)
NEUTROPHILS # BLD AUTO: 6.37 10*3/MM3 (ref 1.5–8.3)
NEUTROPHILS NFR BLD AUTO: 76.2 % (ref 41–71)
NITRITE UR-MCNC: NEGATIVE MG/ML
PH UR: 8 [PH] (ref 5–8)
PLATELET # BLD AUTO: 295 10*3/MM3 (ref 150–450)
PMV BLD AUTO: 10.5 FL (ref 6–12)
POTASSIUM BLD-SCNC: 3.6 MMOL/L (ref 3.5–5.5)
PROT SERPL-MCNC: 6.7 G/DL (ref 5.7–8.2)
PROT UR STRIP-MCNC: NEGATIVE MG/DL
RBC # BLD AUTO: 4.65 10*6/MM3 (ref 3.89–5.14)
RBC # UR STRIP: NEGATIVE /UL
SODIUM BLD-SCNC: 136 MMOL/L (ref 132–146)
SP GR UR: 1 (ref 1–1.03)
UROBILINOGEN UR QL: NORMAL
WBC NRBC COR # BLD: 8.36 10*3/MM3 (ref 3.5–10.8)

## 2018-09-13 PROCEDURE — 80053 COMPREHEN METABOLIC PANEL: CPT | Performed by: INTERNAL MEDICINE

## 2018-09-13 PROCEDURE — 99214 OFFICE O/P EST MOD 30 MIN: CPT | Performed by: INTERNAL MEDICINE

## 2018-09-13 PROCEDURE — 81003 URINALYSIS AUTO W/O SCOPE: CPT | Performed by: INTERNAL MEDICINE

## 2018-09-13 PROCEDURE — 85025 COMPLETE CBC W/AUTO DIFF WBC: CPT | Performed by: INTERNAL MEDICINE

## 2018-09-13 PROCEDURE — 74018 RADEX ABDOMEN 1 VIEW: CPT

## 2018-09-13 PROCEDURE — 36415 COLL VENOUS BLD VENIPUNCTURE: CPT | Performed by: INTERNAL MEDICINE

## 2018-09-13 RX ORDER — PANTOPRAZOLE SODIUM 40 MG/1
40 TABLET, DELAYED RELEASE ORAL DAILY
Qty: 30 TABLET | Refills: 2 | Status: SHIPPED | OUTPATIENT
Start: 2018-09-13 | End: 2020-02-13

## 2018-09-13 RX ORDER — SULFAMETHOXAZOLE AND TRIMETHOPRIM 800; 160 MG/1; MG/1
1 TABLET ORAL 2 TIMES DAILY
Qty: 6 TABLET | Refills: 0 | Status: SHIPPED | OUTPATIENT
Start: 2018-09-13 | End: 2019-02-04

## 2018-09-17 ENCOUNTER — LAB (OUTPATIENT)
Dept: INTERNAL MEDICINE | Facility: CLINIC | Age: 68
End: 2018-09-17

## 2018-09-17 DIAGNOSIS — R10.84 GENERALIZED ABDOMINAL PAIN: ICD-10-CM

## 2018-09-17 PROCEDURE — 87338 HPYLORI STOOL AG IA: CPT | Performed by: INTERNAL MEDICINE

## 2018-09-18 ENCOUNTER — TELEPHONE (OUTPATIENT)
Dept: INTERNAL MEDICINE | Facility: CLINIC | Age: 68
End: 2018-09-18

## 2018-09-18 NOTE — TELEPHONE ENCOUNTER
S/W PT, EXPL THAT STOOL STUDIES TAKE SEVERAL DAYS TO COME BACK AND WE DO NOT HAVE RESULTS BUT THAT IF SHE IS STILL FEELING THAT ILL THAT DR VICENTE RECOMMENDS THAT SHE GO ON TO THE ER.  STATES SHE DOESN'T WANT TO GO TO ER THAT THEY WILL JUST SEND HER HOME AND NOTHING WILL IMPROVE.  STATES SHE HAS DONE THAT TWICE.  STATES HER MAIN SYMPTOM IS NAUSEA.  STATES SHE NEEDS MORE PHENERGAN.  STATES SHE IS TAKING MIRALAX AND GOING TO BR BETTER.  STATES HER XRAY SHOWED SHE HAD LOTS OF RETAINED STOOL AND SHE FEELS THAT HAS TO BE BETTER.     DISCUSSED WITH DR VICENTE., STATES OKAY TO SEND IN RX FOR PHENERGAN.  25 MG EVERY 6 HOURS PRN #[id number].    INFORMED PT DR VICENTE SAID OKAY TO SEND IN RX FOR MORE PHENERGAN AND ASKED WHAT PHARMACY.  NOW STATES SHE HAS SEVERAL PHENERGAN TABLETS LEFT AND DOESN'T WANT ANOTHER RX.  STATES SHE COMPLETED THE 3 DAYS OF BACTRIM YESTERDAY AND THAT SHE IS STILL TAKING THE PANTOLPRAZOLE.  EXPL WILL LET HER KNOW WHEN STOOL STUDIES ARE BACK BUT THAT IF SHE WORSENS OR DOESN'T IMPROVE THAT SHE SHOULD GO ONTO ER.      RX FOR PHENERGAN WAS NOT SENT.

## 2018-09-18 NOTE — TELEPHONE ENCOUNTER
It takes several days to get the stool test back.  If she is that ill, she should go to the ER.  Marshal Guerrero MD  2:35 PM  09/18/18

## 2018-09-18 NOTE — TELEPHONE ENCOUNTER
----- Message from Mellisa Trejo sent at 9/18/2018 11:23 AM EDT -----  Patient states she is still very sick and it is hard for her to eat.  Wants to know when her test from the stool sample will be back?  Patient can be reached on cell [phone] or home at [phone] to discuss.  Stated she may have to go to ER and would like a call back ASAP.

## 2018-09-20 ENCOUNTER — TELEPHONE (OUTPATIENT)
Dept: INTERNAL MEDICINE | Facility: CLINIC | Age: 68
End: 2018-09-20

## 2018-09-20 DIAGNOSIS — R10.13 EPIGASTRIC PAIN: Primary | ICD-10-CM

## 2018-09-20 LAB — H PYLORI AG STL QL IA: NEGATIVE

## 2018-09-20 NOTE — TELEPHONE ENCOUNTER
----- Message from Mellisa Trejo sent at 9/20/2018 12:32 PM EDT -----  Patient calling to get results of stool study (H.Pylori).  Wants results before going into the weekend.  She can be reached on cell [phone] or home [phone].

## 2018-09-20 NOTE — TELEPHONE ENCOUNTER
S/W PT, INFORMED THAT HER H-PYLORI TEST WAS NEGATIVE AND THAT DR VICENTE RECOMMENDS AN EGD.  VERB UNDERSTANDING AND AGREEMENT.  STATES SHE DOES NOT WANT TO GO TO [facility].  EXPL WILL LET THEM KNOW.  EXPL DR VICENTE WILL ORDER AND THEN SOMEONE WILL CALL HER WITH APPT DETAILS.

## 2018-09-20 NOTE — TELEPHONE ENCOUNTER
It is negative.  I think she needs an EGD.  If she is willing to do this let me know and I'll schedule.  Marshal Guerrero MD  1:36 PM  09/20/18

## 2018-09-24 NOTE — PROGRESS NOTES
Chief Complaint   Patient presents with   • Abdominal Pain       History of Present Illness      The patient presents for a follow-up related to epigastric pain. There is a one month history of abdominal pain. The pain is intermittent. The pain is in the epigastric area and it does not radiate. The pain is characterized as dull. The patient has not had a fever. The patient reports no aggravating factors.   The patient has noted no alleviating factors. The patient also reports constipation in association with the pain but denies having hematuria, dysuria, nausea, vomiting, diarrhea, a rash, rectal bleeding or urinary hesitancy. There has not been vaginal discharge.    The patient presents for follow-up of constipation. The constipation has worsened. The patient has been compliant with treatment as outlined at the last visit. The patient's bowel movements occur approximately every other day. The stools are soft. Bowel Movements are not painful. There is no associated enuresis. The patient has abdominal pain. The patient denies neglecting the urge or putting off bowel movements.    Review of Systems    GENERAL/CONSTITUTIONAL- Denies Unexplained Weight Loss, Fever, Chills, Sweats, Fatigue, Weakness or Malaise.    CARDIOVASCULAR- Denies Chest Pain, Claudication, Edema, Syncope or Palpitations.    PULMONARY- Denies Wheezing, Dyspnea, Sputum Production, Cough, Hemoptysis or Pleuritic Chest Pain.    Medications      Current Outpatient Prescriptions:   •  aspirin tablet, Take 81 mg by mouth daily., Disp: , Rfl:   •  hydrocortisone-pramoxine (PROCTOFOAM HC) 1-1 % rectal foam, Insert 1 applicator into the rectum Every 6 (Six) Hours As Needed for Hemorrhoids., Disp: 1 each, Rfl: 0  •  losartan-hydrochlorothiazide (HYZAAR) 50-12.5 MG per tablet, Take 1 tablet by mouth Daily., Disp: 30 tablet, Rfl: 5  •  pantoprazole (PROTONIX) 40 MG EC tablet, Take 1 tablet by mouth Daily., Disp: 30 tablet, Rfl: 2  •   sulfamethoxazole-trimethoprim (BACTRIM DS) 800-160 MG per tablet, Take 1 tablet by mouth 2 (Two) Times a Day., Disp: 6 tablet, Rfl: 0     Allergies    Allergies   Allergen Reactions   • Prilosec [Omeprazole] Myalgia   • Zofran [Ondansetron Hcl] Itching   • Cefdinir Rash   • Penicillins Rash       Problem List    Patient Active Problem List   Diagnosis   • Arthritis   • Hyperlipidemia   • Hyperglycemia   • Hypertension   • Morbid obesity (CMS/HCC)   • Muscular aches   • Venous insufficiency   • Osteoarthritis   • Low back pain   • Arthralgia       Medications, Allergies, Problems List and Past History were reviewed and updated.    Physical Examination    /72 (BP Location: Right arm, Patient Position: Sitting, Cuff Size: Adult)   Pulse 60   Temp 98.9 °F (37.2 °C) (Temporal Artery )   Resp 18   Wt 76.2 kg (168 lb)   LMP  (LMP Unknown) Comment: LAST PAP 2015 BY DR FIELD  Breastfeeding? No   BMI 35.11 kg/m²     HEENT: Head- Normocephalic Atraumatic. Facies- Within normal limits. Pinnas- Normal texture and shape bilaterally. Canals- Normal bilaterally. TMs- Normal bilaterally. Nares- Patent bilaterally. Nasal Septum- is normal. There is no tenderness to palpation over the frontal or maxillary sinuses. Lids- Normal bilaterally. Conjunctiva- Clear bilaterally. Sclera- Anicteric bilaterally. Oropharynx- Moist with no lesions. Tonsils- No enlargement, erythema or exudate.    Lungs: Auscultation- Clear to auscultation bilaterally. There are no retractions, clubbing or cyanosis. The Expiratory to Inspiratory ratio is equal.    Cardiovascular: There are no carotid bruits. Heart- Normal Rate with Regular rhythm and no murmurs. There are no gallops. There are no rubs. In the lower extremities there is no edema. The upper extremities do not have edema.    Abdomen: Soft, benign, non-tender with no masses, hernias, organomegaly or scars.    Impression and Assessment    Epigastric Abdominal  Pain.    Constipation.    Plan    Epigastric Abdominal Pain Plan: Further plans will be made after the tests are reviewed.    Constipation Plan: Further plans will be made after the tests are reviewed.    Bharti was seen today for abdominal pain.    Diagnoses and all orders for this visit:    Generalized abdominal pain  -     XR Abdomen KUB; Future  -     POC Urinalysis Dipstick, Automated  -     Comprehensive Metabolic Panel  -     CBC & Differential  -     H. Pylori Antigen, Stool - Stool, Per Rectum; Future  -     pantoprazole (PROTONIX) 40 MG EC tablet; Take 1 tablet by mouth Daily.  -     CBC Auto Differential    Constipation, unspecified constipation type    Urinary tract infection without hematuria, site unspecified  -     sulfamethoxazole-trimethoprim (BACTRIM DS) 800-160 MG per tablet; Take 1 tablet by mouth 2 (Two) Times a Day.        Return to Office    The patient was instructed to return for follow-up in 3 months.    The patient was instructed to return sooner if the condition changes, worsens, or doesn't resolve.

## 2018-09-27 ENCOUNTER — OUTSIDE FACILITY SERVICE (OUTPATIENT)
Dept: GASTROENTEROLOGY | Facility: CLINIC | Age: 68
End: 2018-09-27

## 2018-09-27 ENCOUNTER — LAB REQUISITION (OUTPATIENT)
Dept: LAB | Facility: HOSPITAL | Age: 68
End: 2018-09-27

## 2018-09-27 DIAGNOSIS — R11.0 NAUSEA: ICD-10-CM

## 2018-09-27 PROCEDURE — 43239 EGD BIOPSY SINGLE/MULTIPLE: CPT | Performed by: INTERNAL MEDICINE

## 2018-09-27 PROCEDURE — 88305 TISSUE EXAM BY PATHOLOGIST: CPT | Performed by: INTERNAL MEDICINE

## 2018-09-28 ENCOUNTER — TELEPHONE (OUTPATIENT)
Dept: GASTROENTEROLOGY | Facility: CLINIC | Age: 68
End: 2018-09-28

## 2018-09-28 DIAGNOSIS — R11.0 NAUSEA: Primary | ICD-10-CM

## 2018-09-28 DIAGNOSIS — R10.13 DYSPEPSIA: ICD-10-CM

## 2018-09-28 LAB
CYTO UR: NORMAL
LAB AP CASE REPORT: NORMAL
LAB AP CLINICAL INFORMATION: NORMAL
PATH REPORT.FINAL DX SPEC: NORMAL
PATH REPORT.GROSS SPEC: NORMAL

## 2018-10-03 ENCOUNTER — HOSPITAL ENCOUNTER (OUTPATIENT)
Dept: ULTRASOUND IMAGING | Facility: HOSPITAL | Age: 68
Discharge: HOME | End: 2018-10-03
Attending: INTERNAL MEDICINE | Admitting: INTERNAL MEDICINE

## 2018-10-03 DIAGNOSIS — R10.13 DYSPEPSIA: ICD-10-CM

## 2018-10-03 DIAGNOSIS — R11.0 NAUSEA: ICD-10-CM

## 2018-10-03 PROCEDURE — 76705 ECHO EXAM OF ABDOMEN: CPT

## 2018-10-05 ENCOUNTER — TELEPHONE (OUTPATIENT)
Dept: GASTROENTEROLOGY | Facility: CLINIC | Age: 68
End: 2018-10-05

## 2018-10-08 ENCOUNTER — TELEPHONE (OUTPATIENT)
Dept: INTERNAL MEDICINE | Facility: CLINIC | Age: 68
End: 2018-10-08

## 2018-10-08 NOTE — TELEPHONE ENCOUNTER
PT RETURNED CALL-DOES NOT WANT TO COME IN THIS YEAR AT ALL SO NO WELLNESS SCHEDULED AND APPT CANCELLED W/DR KENNEDY

## 2018-10-08 NOTE — TELEPHONE ENCOUNTER
Called pt in regards to scheduling Subsequent Medicare AWV to conicide with next routine f/u as scheduled 11/6/18 9:45am with Dr. Guerrero.  Currently, schedule availability for AWV either prior to or after the appt same day. LVM and requested a return call.

## 2018-10-18 ENCOUNTER — FLU SHOT (OUTPATIENT)
Dept: INTERNAL MEDICINE | Facility: CLINIC | Age: 68
End: 2018-10-18

## 2018-10-18 PROCEDURE — 90662 IIV NO PRSV INCREASED AG IM: CPT | Performed by: INTERNAL MEDICINE

## 2018-10-18 PROCEDURE — G0008 ADMIN INFLUENZA VIRUS VAC: HCPCS | Performed by: INTERNAL MEDICINE

## 2018-11-05 ENCOUNTER — TELEPHONE (OUTPATIENT)
Dept: INTERNAL MEDICINE | Facility: CLINIC | Age: 68
End: 2018-11-05

## 2018-11-05 NOTE — TELEPHONE ENCOUNTER
----- Message from April PHILLIP Tineo sent at 11/5/2018  8:47 AM EST -----  Contact: PT   CALL FROM PT WANTING TO KNOW IF A ORDER CAN BE ENTERED FOR HEP A   CALL BACK NUMBER FOR [phone]. PT IS ALSO WANTING TO KNOW IF INSURANCE WILL COVER THIS. CELL NUMBER [phone]

## 2018-11-05 NOTE — TELEPHONE ENCOUNTER
She will need to get this at a pharmacy.  She doesn't need an order.  Marshal Geurrero MD  9:13 AM  11/05/18

## 2018-11-05 NOTE — TELEPHONE ENCOUNTER
S/W PT, INFORMED THAT DR VICENTE DOES RECOMMEND THE HEPATITIS A VACCINE BUT THAT THEY DO NOT NEED AN ORDER FOR IT AND CAN GET AT THE LOCAL PHARMACY OF THEIR CHOICE.  EXPL WE HAVE NO WAY OF KNOWING HOW THEIR INSURANCE WILL COVER IT BUT THE PHARMACY CAN RUN IT AND TELL THEM BEFORE THEY GET IT.  VERB GOOD UNDERSTANDING AND APPREC.

## 2019-02-04 ENCOUNTER — OFFICE VISIT (OUTPATIENT)
Dept: INTERNAL MEDICINE | Facility: CLINIC | Age: 69
End: 2019-02-04

## 2019-02-04 VITALS
TEMPERATURE: 98.4 F | WEIGHT: 179.2 LBS | BODY MASS INDEX: 37.61 KG/M2 | RESPIRATION RATE: 18 BRPM | HEIGHT: 58 IN | HEART RATE: 61 BPM | DIASTOLIC BLOOD PRESSURE: 78 MMHG | SYSTOLIC BLOOD PRESSURE: 138 MMHG

## 2019-02-04 DIAGNOSIS — Z13.89 ENCOUNTER FOR SCREENING FOR OTHER DISORDER: ICD-10-CM

## 2019-02-04 DIAGNOSIS — I10 ESSENTIAL HYPERTENSION: ICD-10-CM

## 2019-02-04 DIAGNOSIS — E78.5 HYPERLIPIDEMIA, UNSPECIFIED HYPERLIPIDEMIA TYPE: Primary | ICD-10-CM

## 2019-02-04 DIAGNOSIS — E66.01 MORBID OBESITY DUE TO EXCESS CALORIES (HCC): ICD-10-CM

## 2019-02-04 DIAGNOSIS — R73.03 PREDIABETES: ICD-10-CM

## 2019-02-04 DIAGNOSIS — E66.01 MORBID OBESITY (HCC): ICD-10-CM

## 2019-02-04 PROBLEM — M54.16 LUMBAR RADICULOPATHY: Status: ACTIVE | Noted: 2018-04-17

## 2019-02-04 LAB
ALBUMIN SERPL-MCNC: 4.38 G/DL (ref 3.2–4.8)
ALBUMIN/GLOB SERPL: 2.2 G/DL (ref 1.5–2.5)
ALP SERPL-CCNC: 77 U/L (ref 25–100)
ALT SERPL W P-5'-P-CCNC: 17 U/L (ref 7–40)
ANION GAP SERPL CALCULATED.3IONS-SCNC: 5 MMOL/L (ref 3–11)
ARTICHOKE IGE QN: 176 MG/DL (ref 0–130)
AST SERPL-CCNC: 23 U/L (ref 0–33)
BASOPHILS # BLD AUTO: 0.02 10*3/MM3 (ref 0–0.2)
BASOPHILS NFR BLD AUTO: 0.3 % (ref 0–1)
BILIRUB SERPL-MCNC: 0.9 MG/DL (ref 0.3–1.2)
BUN BLD-MCNC: 18 MG/DL (ref 9–23)
BUN/CREAT SERPL: 24.7 (ref 7–25)
CALCIUM SPEC-SCNC: 9.3 MG/DL (ref 8.7–10.4)
CHLORIDE SERPL-SCNC: 104 MMOL/L (ref 99–109)
CHOLEST SERPL-MCNC: 235 MG/DL (ref 0–200)
CO2 SERPL-SCNC: 29 MMOL/L (ref 20–31)
CREAT BLD-MCNC: 0.73 MG/DL (ref 0.6–1.3)
DEPRECATED RDW RBC AUTO: 42 FL (ref 37–54)
EOSINOPHIL # BLD AUTO: 0.07 10*3/MM3 (ref 0–0.3)
EOSINOPHIL NFR BLD AUTO: 1.1 % (ref 0–3)
ERYTHROCYTE [DISTWIDTH] IN BLOOD BY AUTOMATED COUNT: 13.3 % (ref 11.3–14.5)
EXPIRATION DATE: NORMAL
GFR SERPL CREATININE-BSD FRML MDRD: 79 ML/MIN/1.73
GLOBULIN UR ELPH-MCNC: 2 GM/DL
GLUCOSE BLD-MCNC: 98 MG/DL (ref 70–100)
HBA1C MFR BLD: 5.5 %
HCT VFR BLD AUTO: 41.5 % (ref 34.5–44)
HDLC SERPL-MCNC: 58 MG/DL (ref 40–60)
HGB BLD-MCNC: 13.9 G/DL (ref 11.5–15.5)
IMM GRANULOCYTES # BLD AUTO: 0.02 10*3/MM3 (ref 0–0.03)
IMM GRANULOCYTES NFR BLD AUTO: 0.3 % (ref 0–0.6)
LYMPHOCYTES # BLD AUTO: 1.54 10*3/MM3 (ref 0.6–4.8)
LYMPHOCYTES NFR BLD AUTO: 24.3 % (ref 24–44)
Lab: NORMAL
MCH RBC QN AUTO: 29.3 PG (ref 27–31)
MCHC RBC AUTO-ENTMCNC: 33.5 G/DL (ref 32–36)
MCV RBC AUTO: 87.4 FL (ref 80–99)
MONOCYTES # BLD AUTO: 0.65 10*3/MM3 (ref 0–1)
MONOCYTES NFR BLD AUTO: 10.3 % (ref 0–12)
NEUTROPHILS # BLD AUTO: 4.05 10*3/MM3 (ref 1.5–8.3)
NEUTROPHILS NFR BLD AUTO: 64 % (ref 41–71)
PLATELET # BLD AUTO: 240 10*3/MM3 (ref 150–450)
PMV BLD AUTO: 10.4 FL (ref 6–12)
POTASSIUM BLD-SCNC: 4 MMOL/L (ref 3.5–5.5)
PROT SERPL-MCNC: 6.4 G/DL (ref 5.7–8.2)
RBC # BLD AUTO: 4.75 10*6/MM3 (ref 3.89–5.14)
SODIUM BLD-SCNC: 138 MMOL/L (ref 132–146)
T4 FREE SERPL-MCNC: 1.04 NG/DL (ref 0.89–1.76)
TRIGL SERPL-MCNC: 117 MG/DL (ref 0–150)
TSH SERPL DL<=0.05 MIU/L-ACNC: 0.88 MIU/ML (ref 0.35–5.35)
WBC NRBC COR # BLD: 6.33 10*3/MM3 (ref 3.5–10.8)

## 2019-02-04 PROCEDURE — 80061 LIPID PANEL: CPT | Performed by: NURSE PRACTITIONER

## 2019-02-04 PROCEDURE — 80053 COMPREHEN METABOLIC PANEL: CPT | Performed by: NURSE PRACTITIONER

## 2019-02-04 PROCEDURE — 36415 COLL VENOUS BLD VENIPUNCTURE: CPT | Performed by: NURSE PRACTITIONER

## 2019-02-04 PROCEDURE — 85025 COMPLETE CBC W/AUTO DIFF WBC: CPT | Performed by: NURSE PRACTITIONER

## 2019-02-04 PROCEDURE — 84439 ASSAY OF FREE THYROXINE: CPT | Performed by: NURSE PRACTITIONER

## 2019-02-04 PROCEDURE — G0439 PPPS, SUBSEQ VISIT: HCPCS | Performed by: NURSE PRACTITIONER

## 2019-02-04 PROCEDURE — 84443 ASSAY THYROID STIM HORMONE: CPT | Performed by: NURSE PRACTITIONER

## 2019-02-04 PROCEDURE — 83036 HEMOGLOBIN GLYCOSYLATED A1C: CPT | Performed by: NURSE PRACTITIONER

## 2019-02-04 NOTE — PROGRESS NOTES
QUICK REFERENCE INFORMATION:  The ABCs of the Annual Wellness Visit    Subsequent Medicare Wellness Visit    HEALTH RISK ASSESSMENT    1950    Recent Hospitalizations:  Recently treated at the following:  Other: [facility] 6/2018 for right knee replacement.        Current Medical Providers:  Patient Care Team:  Marshal Guerrero MD as PCP - General (Internal Medicine)  Marshal Guerrero MD as PCP - Claims Attributed        Smoking Status:  Social History     Tobacco Use   Smoking Status Never Smoker   Smokeless Tobacco Never Used       Alcohol Consumption:  Social History     Substance and Sexual Activity   Alcohol Use No       Depression Screen:   PHQ-2/PHQ-9 Depression Screening 2/4/2019   Little interest or pleasure in doing things 0   Feeling down, depressed, or hopeless 0   Total Score 0       Health Habits and Functional and Cognitive Screening:  Functional & Cognitive Status 2/4/2019   Do you have difficulty preparing food and eating? No   Do you have difficulty bathing yourself, getting dressed or grooming yourself? No   Do you have difficulty using the toilet? No   Do you have difficulty moving around from place to place? No   Do you have trouble with steps or getting out of a bed or a chair? No   In the past year have you fallen or experienced a near fall? No   Current Diet Well Balanced Diet   Dental Exam Up to date   Eye Exam Up to date   Exercise (times per week) 2 times per week   Current Exercise Activities Include Housecleaning   Do you need help using the phone?  No   Are you deaf or do you have serious difficulty hearing?  No   Do you need help with transportation? No   Do you need help shopping? No   Do you need help preparing meals?  No   Do you need help with housework?  No   Do you need help with laundry? No   Do you need help taking your medications? No   Do you need help managing money? No   Do you ever drive or ride in a car without wearing a seat belt? No   Have you felt  unusual stress, anger or loneliness in the last month? No   Who do you live with? Spouse   If you need help, do you have trouble finding someone available to you? No   Have you been bothered in the last four weeks by sexual problems? No   Do you have difficulty concentrating, remembering or making decisions? No           Does the patient have evidence of cognitive impairment? No    Aspirin use counseling: Taking ASA appropriately as indicated      Recent Lab Results:  CMP:  Lab Results   Component Value Date    BUN 18 02/04/2019    CREATININE 0.73 02/04/2019    EGFRIFNONA 79 02/04/2019    BCR 24.7 02/04/2019     02/04/2019    K 4.0 02/04/2019    CO2 29.0 02/04/2019    CALCIUM 9.3 02/04/2019    ALBUMIN 4.38 02/04/2019    BILITOT 0.9 02/04/2019    ALKPHOS 77 02/04/2019    AST 23 02/04/2019    ALT 17 02/04/2019     Lipid Panel:  Lab Results   Component Value Date    CHOL 235 (H) 02/04/2019    TRIG 117 02/04/2019    HDL 58 02/04/2019     HbA1c:  Lab Results   Component Value Date    HGBA1C 5.5 02/04/2019       Visual Acuity:  No exam data present    Age-appropriate Screening Schedule:  Refer to the list below for future screening recommendations based on patient's age, sex and/or medical conditions. Orders for these recommended tests are listed in the plan section. The patient has been provided with a written plan.    Health Maintenance   Topic Date Due   • ZOSTER VACCINE (2 of 3) 07/18/2014   • PNEUMOCOCCAL VACCINES (65+ LOW/MEDIUM RISK) (2 of 2 - PPSV23) 02/28/2019   • LIPID PANEL  02/28/2019   • MAMMOGRAM  05/08/2020   • TDAP/TD VACCINES (2 - Td) 05/23/2024   • COLONOSCOPY  01/26/2026   • INFLUENZA VACCINE  Completed        Subjective   History of Present Illness    Bharit Haines is a 69 y.o. female who presents for an Subsequent Wellness Visit.    The following portions of the patient's history were reviewed and updated as appropriate: allergies, current medications, past family history, past medical  "history, past social history, past surgical history and problem list.    Outpatient Medications Prior to Visit   Medication Sig Dispense Refill   • aspirin tablet Take 81 mg by mouth daily.     • losartan-hydrochlorothiazide (HYZAAR) 50-12.5 MG per tablet Take 1 tablet by mouth Daily. 30 tablet 5   • pantoprazole (PROTONIX) 40 MG EC tablet Take 1 tablet by mouth Daily. 30 tablet 2   • hydrocortisone-pramoxine (PROCTOFOAM HC) 1-1 % rectal foam Insert 1 applicator into the rectum Every 6 (Six) Hours As Needed for Hemorrhoids. 1 each 0   • sulfamethoxazole-trimethoprim (BACTRIM DS) 800-160 MG per tablet Take 1 tablet by mouth 2 (Two) Times a Day. 6 tablet 0     No facility-administered medications prior to visit.        Patient Active Problem List   Diagnosis   • Arthritis   • Hyperlipidemia   • Hyperglycemia   • Hypertension   • Morbid obesity (CMS/HCC)   • Muscular aches   • Venous insufficiency   • Osteoarthritis   • Low back pain   • Arthralgia   • Lumbar radiculopathy       Advance Care Planning:  has NO advance directive - information provided to the patient today    Identification of Risk Factors:  Risk factors include: weight , unhealthy diet, cardiovascular risk, inactivity, increased fall risk, vision limitations and polypharmacy.    Review of Systems    Compared to one year ago, the patient feels her physical health is the same.  Compared to one year ago, the patient feels her mental health is the same.    Objective     Physical Exam    Vitals:    02/04/19 0941   BP: 138/78   Pulse: 61   Resp: 18   Temp: 98.4 °F (36.9 °C)   Weight: 81.3 kg (179 lb 3.2 oz)   Height: 147.3 cm (58\")       Patient's Body mass index is 37.45 kg/m². BMI is above normal parameters. Recommendations include: educational material, exercise counseling and nutrition counseling.      Assessment/Plan   Patient Self-Management and Personalized Health Advice  The patient has been provided with information about: diet, exercise, weight " management, prevention of cardiac or vascular disease, the relationship between weight and GERD, fall prevention and designing advance directives and preventive services including:   · Advance directive, Exercise counseling provided, Fall Risk assessment done, Fall Risk plan of care done, Nutrition counseling provided.    Visit Diagnoses:    ICD-10-CM ICD-9-CM   1. Hyperlipidemia, unspecified hyperlipidemia type E78.5 272.4   2. Morbid obesity due to excess calories (CMS/HCC) E66.01 278.01   3. Essential hypertension I10 401.9   4. Morbid obesity (CMS/[facility]) E66.01 278.01   5. Encounter for screening for other disorder Z13.89 V82.89   6. Prediabetes R73.03 790.29       Orders Placed This Encounter   Procedures   • Lipid Panel   • Comprehensive Metabolic Panel   • T4, Free   • TSH   • CBC Auto Differential   • POC Glycosylated Hemoglobin (Hb A1C)   • CBC & Differential     Order Specific Question:   Manual Differential     Answer:   No       Outpatient Encounter Medications as of 2/4/2019   Medication Sig Dispense Refill   • aspirin tablet Take 81 mg by mouth daily.     • losartan-hydrochlorothiazide (HYZAAR) 50-12.5 MG per tablet Take 1 tablet by mouth Daily. 30 tablet 5   • pantoprazole (PROTONIX) 40 MG EC tablet Take 1 tablet by mouth Daily. 30 tablet 2   • [DISCONTINUED] hydrocortisone-pramoxine (PROCTOFOAM HC) 1-1 % rectal foam Insert 1 applicator into the rectum Every 6 (Six) Hours As Needed for Hemorrhoids. 1 each 0   • [DISCONTINUED] sulfamethoxazole-trimethoprim (BACTRIM DS) 800-160 MG per tablet Take 1 tablet by mouth 2 (Two) Times a Day. 6 tablet 0     No facility-administered encounter medications on file as of 2/4/2019.        Reviewed use of high risk medication in the elderly: yes  Reviewed for potential of harmful drug interactions in the elderly: yes    Follow Up:  Return if symptoms worsen or fail to improve, for F/U for Subsequent Wellness in one year & a day.     An After Visit Summary and PPPS with  all of these plans were given to the patient.

## 2019-02-04 NOTE — PATIENT INSTRUCTIONS
Fall Prevention in the Home  Falls can cause injuries. They can happen to people of all ages. There are many things you can do to make your home safe and to help prevent falls.  What can I do on the outside of my home?  · Regularly fix the edges of walkways and driveways and fix any cracks.  · Remove anything that might make you trip as you walk through a door, such as a raised step or threshold.  · Trim any bushes or trees on the path to your home.  · Use bright outdoor lighting.  · Clear any walking paths of anything that might make someone trip, such as rocks or tools.  · Regularly check to see if handrails are loose or broken. Make sure that both sides of any steps have handrails.  · Any raised decks and porches should have guardrails on the edges.  · Have any leaves, snow, or ice cleared regularly.  · Use sand or salt on walking paths during winter.  · Clean up any spills in your garage right away. This includes oil or grease spills.  What can I do in the bathroom?  · Use night lights.  · Install grab bars by the toilet and in the tub and shower. Do not use towel bars as grab bars.  · Use non-skid mats or decals in the tub or shower.  · If you need to sit down in the shower, use a plastic, non-slip stool.  · Keep the floor dry. Clean up any water that spills on the floor as soon as it happens.  · Remove soap buildup in the tub or shower regularly.  · Attach bath mats securely with double-sided non-slip rug tape.  · Do not have throw rugs and other things on the floor that can make you trip.  What can I do in the bedroom?  · Use night lights.  · Make sure that you have a light by your bed that is easy to reach.  · Do not use any sheets or blankets that are too big for your bed. They should not hang down onto the floor.  · Have a firm chair that has side arms. You can use this for support while you get dressed.  · Do not have throw rugs and other things on the floor that can make you trip.  What can I do in the  kitchen?  · Clean up any spills right away.  · Avoid walking on wet floors.  · Keep items that you use a lot in easy-to-reach places.  · If you need to reach something above you, use a strong step stool that has a grab bar.  · Keep electrical cords out of the way.  · Do not use floor polish or wax that makes floors slippery. If you must use wax, use non-skid floor wax.  · Do not have throw rugs and other things on the floor that can make you trip.  What can I do with my stairs?  · Do not leave any items on the stairs.  · Make sure that there are handrails on both sides of the stairs and use them. Fix handrails that are broken or loose. Make sure that handrails are as long as the stairways.  · Check any carpeting to make sure that it is firmly attached to the stairs. Fix any carpet that is loose or worn.  · Avoid having throw rugs at the top or bottom of the stairs. If you do have throw rugs, attach them to the floor with carpet tape.  · Make sure that you have a light switch at the top of the stairs and the bottom of the stairs. If you do not have them, ask someone to add them for you.  What else can I do to help prevent falls?  · Wear shoes that:  ? Do not have high heels.  ? Have rubber bottoms.  ? Are comfortable and fit you well.  ? Are closed at the toe. Do not wear sandals.  · If you use a stepladder:  ? Make sure that it is fully opened. Do not climb a closed stepladder.  ? Make sure that both sides of the stepladder are locked into place.  ? Ask someone to hold it for you, if possible.  · Clearly isaura and make sure that you can see:  ? Any grab bars or handrails.  ? First and last steps.  ? Where the edge of each step is.  · Use tools that help you move around (mobility aids) if they are needed. These include:  ? Canes.  ? Walkers.  ? Scooters.  ? Crutches.  · Turn on the lights when you go into a dark area. Replace any light bulbs as soon as they burn out.  · Set up your furniture so you have a clear path.  Avoid moving your furniture around.  · If any of your floors are uneven, fix them.  · If there are any pets around you, be aware of where they are.  · Review your medicines with your doctor. Some medicines can make you feel dizzy. This can increase your chance of falling.  Ask your doctor what other things that you can do to help prevent falls.  This information is not intended to replace advice given to you by your health care provider. Make sure you discuss any questions you have with your health care provider.  Document Released: 10/14/2010 Document Revised: 05/25/2017 Document Reviewed: 01/22/2016  [employer] Interactive Patient Education © 2018 [employer]  BMI for Adults  Body mass index (BMI) is a number that is calculated from a person's weight and height. In most adults, the number is used to find how much of an adult's weight is made up of fat. BMI is not as accurate as a direct measure of body fat.  How is BMI calculated?  BMI is calculated by dividing weight in kilograms by height in meters squared. It can also be calculated by dividing weight in pounds by height in inches squared, then multiplying the resulting number by 703. Charts are available to help you find your BMI quickly and easily without doing this calculation.  How is BMI interpreted?  Health care professionals use BMI charts to identify whether an adult is underweight, at a normal weight, or overweight based on the following guidelines:  · Underweight: BMI less than 18.5.  · Normal weight: BMI between 18.5 and 24.9.  · Overweight: BMI between 25 and 29.9.  · Obese: BMI of 30 and above.    BMI is usually interpreted the same for males and females.  Weight includes both fat and muscle, so someone with a muscular build, such as an athlete, may have a BMI that is higher than 24.9. In cases like these, BMI may not accurately depict body fat. To determine if excess body fat is the cause of a BMI of 25 or higher, further assessments may need to be  done by a health care provider.  Why is BMI a useful tool?  BMI is used to identify a possible weight problem that may be related to a medical problem or may increase the risk for medical problems. BMI can also be used to promote changes to reach a healthy weight.  This information is not intended to replace advice given to you by your health care provider. Make sure you discuss any questions you have with your health care provider.  Document Released: 08/29/2005 Document Revised: 04/27/2017 Document Reviewed: 05/15/2015  [employer] Interactive Patient Education © 2018 [employer]

## 2019-02-05 ENCOUNTER — TELEPHONE (OUTPATIENT)
Dept: INTERNAL MEDICINE | Facility: CLINIC | Age: 69
End: 2019-02-05

## 2019-02-05 ENCOUNTER — TRANSCRIBE ORDERS (OUTPATIENT)
Dept: ADMINISTRATIVE | Facility: HOSPITAL | Age: 69
End: 2019-02-05

## 2019-02-05 DIAGNOSIS — Z12.31 VISIT FOR SCREENING MAMMOGRAM: Primary | ICD-10-CM

## 2019-02-05 NOTE — TELEPHONE ENCOUNTER
Telephone call to patient to inform of elevated cholesterol. Patient states she cannot tolerate statins and does not believe fish oil improves anything. She states she will try to decrease fat in diet.

## 2019-02-05 NOTE — TELEPHONE ENCOUNTER
----- Message from MIKE Teixeira sent at 2/5/2019  3:14 PM EST -----  Please inform patient A1C normal. Thyroid labs normal. CBC stable.

## 2019-05-13 ENCOUNTER — HOSPITAL ENCOUNTER (OUTPATIENT)
Dept: MAMMOGRAPHY | Facility: HOSPITAL | Age: 69
Discharge: HOME | End: 2019-05-13
Admitting: INTERNAL MEDICINE

## 2019-05-13 DIAGNOSIS — Z12.31 VISIT FOR SCREENING MAMMOGRAM: ICD-10-CM

## 2019-05-13 PROCEDURE — 77063 BREAST TOMOSYNTHESIS BI: CPT | Performed by: RADIOLOGY

## 2019-05-13 PROCEDURE — 77067 SCR MAMMO BI INCL CAD: CPT

## 2019-05-13 PROCEDURE — 77063 BREAST TOMOSYNTHESIS BI: CPT

## 2019-05-13 PROCEDURE — 77067 SCR MAMMO BI INCL CAD: CPT | Performed by: RADIOLOGY

## 2019-10-10 ENCOUNTER — FLU SHOT (OUTPATIENT)
Dept: INTERNAL MEDICINE | Facility: CLINIC | Age: 69
End: 2019-10-10

## 2019-10-10 DIAGNOSIS — Z23 IMMUNIZATION, PNEUMOCOCCUS AND INFLUENZA: ICD-10-CM

## 2019-10-10 PROCEDURE — 90653 IIV ADJUVANT VACCINE IM: CPT | Performed by: INTERNAL MEDICINE

## 2019-10-10 PROCEDURE — G0008 ADMIN INFLUENZA VIRUS VAC: HCPCS | Performed by: INTERNAL MEDICINE

## 2020-01-13 ENCOUNTER — TELEPHONE (OUTPATIENT)
Dept: INTERNAL MEDICINE | Facility: CLINIC | Age: 70
End: 2020-01-13

## 2020-01-13 DIAGNOSIS — I10 ESSENTIAL HYPERTENSION: ICD-10-CM

## 2020-01-13 DIAGNOSIS — E78.5 HYPERLIPIDEMIA, UNSPECIFIED HYPERLIPIDEMIA TYPE: Primary | ICD-10-CM

## 2020-01-13 DIAGNOSIS — R73.03 PREDIABETES: ICD-10-CM

## 2020-01-13 NOTE — TELEPHONE ENCOUNTER
WANTS TO HAVE LABS DRAWN BEFORE APPOINTMENT ON 2/5/2020 SO THEY CAN GO OVER LABS AT THE APPOINTMENT   THERE ARE NO ACTIVE LABS IN THE CHART    PLEASE ADVISE AND GIVE CALL BACK

## 2020-01-20 NOTE — TELEPHONE ENCOUNTER
Nnotified that orders are in and can stop in for fasting labs.  Verb good understanding and apprec.

## 2020-01-27 ENCOUNTER — LAB (OUTPATIENT)
Dept: INTERNAL MEDICINE | Facility: CLINIC | Age: 70
End: 2020-01-27

## 2020-01-27 DIAGNOSIS — E78.5 HYPERLIPIDEMIA, UNSPECIFIED HYPERLIPIDEMIA TYPE: ICD-10-CM

## 2020-01-27 DIAGNOSIS — R82.998 LEUKOCYTES IN URINE: Primary | ICD-10-CM

## 2020-01-27 DIAGNOSIS — R73.03 PREDIABETES: ICD-10-CM

## 2020-01-27 DIAGNOSIS — I10 ESSENTIAL HYPERTENSION: ICD-10-CM

## 2020-01-27 LAB
A/C: NORMAL
ALBUMIN SERPL-MCNC: 4.2 G/DL (ref 3.5–5.2)
ALBUMIN/GLOB SERPL: 1.4 G/DL
ALP SERPL-CCNC: 66 U/L (ref 39–117)
ALT SERPL W P-5'-P-CCNC: 14 U/L (ref 1–33)
ANION GAP SERPL CALCULATED.3IONS-SCNC: 13.5 MMOL/L (ref 5–15)
AST SERPL-CCNC: 17 U/L (ref 1–32)
BASOPHILS # BLD AUTO: 0.04 10*3/MM3 (ref 0–0.2)
BASOPHILS NFR BLD AUTO: 0.7 % (ref 0–1.5)
BILIRUB BLD-MCNC: NEGATIVE MG/DL
BILIRUB SERPL-MCNC: 0.7 MG/DL (ref 0.2–1.2)
BUN BLD-MCNC: 16 MG/DL (ref 8–23)
BUN/CREAT SERPL: 19.8 (ref 7–25)
CALCIUM SPEC-SCNC: 9.5 MG/DL (ref 8.6–10.5)
CHLORIDE SERPL-SCNC: 101 MMOL/L (ref 98–107)
CHOLEST SERPL-MCNC: 233 MG/DL (ref 0–200)
CLARITY, POC: CLEAR
CO2 SERPL-SCNC: 24.5 MMOL/L (ref 22–29)
COLOR UR: YELLOW
CREAT BLD-MCNC: 0.81 MG/DL (ref 0.57–1)
DEPRECATED RDW RBC AUTO: 38.9 FL (ref 37–54)
EOSINOPHIL # BLD AUTO: 0.09 10*3/MM3 (ref 0–0.4)
EOSINOPHIL NFR BLD AUTO: 1.5 % (ref 0.3–6.2)
ERYTHROCYTE [DISTWIDTH] IN BLOOD BY AUTOMATED COUNT: 12.7 % (ref 12.3–15.4)
EXPIRATION DATE: ABNORMAL
EXPIRATION DATE: NORMAL
GFR SERPL CREATININE-BSD FRML MDRD: 70 ML/MIN/1.73
GLOBULIN UR ELPH-MCNC: 3 GM/DL
GLUCOSE BLD-MCNC: 101 MG/DL (ref 65–99)
GLUCOSE UR STRIP-MCNC: NEGATIVE MG/DL
HBA1C MFR BLD: 5.8 % (ref 4.8–5.6)
HCT VFR BLD AUTO: 40.2 % (ref 34–46.6)
HDLC SERPL-MCNC: 50 MG/DL (ref 40–60)
HGB BLD-MCNC: 14.3 G/DL (ref 12–15.9)
IMM GRANULOCYTES # BLD AUTO: 0.02 10*3/MM3 (ref 0–0.05)
IMM GRANULOCYTES NFR BLD AUTO: 0.3 % (ref 0–0.5)
KETONES UR QL: NEGATIVE
LDLC SERPL CALC-MCNC: 150 MG/DL (ref 0–100)
LDLC/HDLC SERPL: 3.01 {RATIO}
LEUKOCYTE EST, POC: ABNORMAL
LYMPHOCYTES # BLD AUTO: 1.58 10*3/MM3 (ref 0.7–3.1)
LYMPHOCYTES NFR BLD AUTO: 25.8 % (ref 19.6–45.3)
Lab: ABNORMAL
Lab: NORMAL
MCH RBC QN AUTO: 30.8 PG (ref 26.6–33)
MCHC RBC AUTO-ENTMCNC: 35.6 G/DL (ref 31.5–35.7)
MCV RBC AUTO: 86.5 FL (ref 79–97)
MONOCYTES # BLD AUTO: 0.57 10*3/MM3 (ref 0.1–0.9)
MONOCYTES NFR BLD AUTO: 9.3 % (ref 5–12)
NEUTROPHILS # BLD AUTO: 3.82 10*3/MM3 (ref 1.7–7)
NEUTROPHILS NFR BLD AUTO: 62.4 % (ref 42.7–76)
NITRITE UR-MCNC: NEGATIVE MG/ML
NRBC BLD AUTO-RTO: 0 /100 WBC (ref 0–0.2)
PH UR: 7 [PH] (ref 5–8)
PLATELET # BLD AUTO: 254 10*3/MM3 (ref 140–450)
PMV BLD AUTO: 10.4 FL (ref 6–12)
POC CREATININE URINE: 100
POC MICROALBUMIN URINE: 10
POTASSIUM BLD-SCNC: 4 MMOL/L (ref 3.5–5.2)
PROT SERPL-MCNC: 7.2 G/DL (ref 6–8.5)
PROT UR STRIP-MCNC: NEGATIVE MG/DL
RBC # BLD AUTO: 4.65 10*6/MM3 (ref 3.77–5.28)
RBC # UR STRIP: NEGATIVE /UL
SODIUM BLD-SCNC: 139 MMOL/L (ref 136–145)
SP GR UR: 1 (ref 1–1.03)
TRIGL SERPL-MCNC: 163 MG/DL (ref 0–150)
TSH SERPL DL<=0.05 MIU/L-ACNC: 1.57 UIU/ML (ref 0.27–4.2)
UROBILINOGEN UR QL: NORMAL
VLDLC SERPL-MCNC: 32.6 MG/DL (ref 5–40)
WBC NRBC COR # BLD: 6.12 10*3/MM3 (ref 3.4–10.8)

## 2020-01-27 PROCEDURE — 87086 URINE CULTURE/COLONY COUNT: CPT | Performed by: INTERNAL MEDICINE

## 2020-01-27 PROCEDURE — 80061 LIPID PANEL: CPT | Performed by: INTERNAL MEDICINE

## 2020-01-27 PROCEDURE — 83036 HEMOGLOBIN GLYCOSYLATED A1C: CPT | Performed by: INTERNAL MEDICINE

## 2020-01-27 PROCEDURE — 80053 COMPREHEN METABOLIC PANEL: CPT | Performed by: INTERNAL MEDICINE

## 2020-01-27 PROCEDURE — 81003 URINALYSIS AUTO W/O SCOPE: CPT | Performed by: INTERNAL MEDICINE

## 2020-01-27 PROCEDURE — 87077 CULTURE AEROBIC IDENTIFY: CPT | Performed by: INTERNAL MEDICINE

## 2020-01-27 PROCEDURE — 85025 COMPLETE CBC W/AUTO DIFF WBC: CPT | Performed by: INTERNAL MEDICINE

## 2020-01-27 PROCEDURE — 36415 COLL VENOUS BLD VENIPUNCTURE: CPT | Performed by: INTERNAL MEDICINE

## 2020-01-27 PROCEDURE — 82044 UR ALBUMIN SEMIQUANTITATIVE: CPT | Performed by: INTERNAL MEDICINE

## 2020-01-27 PROCEDURE — 87186 SC STD MICRODIL/AGAR DIL: CPT | Performed by: INTERNAL MEDICINE

## 2020-01-27 PROCEDURE — 84443 ASSAY THYROID STIM HORMONE: CPT | Performed by: INTERNAL MEDICINE

## 2020-01-30 LAB — BACTERIA SPEC AEROBE CULT: ABNORMAL

## 2020-02-12 ENCOUNTER — TRANSCRIBE ORDERS (OUTPATIENT)
Dept: ADMINISTRATIVE | Facility: HOSPITAL | Age: 70
End: 2020-02-12

## 2020-02-12 DIAGNOSIS — Z12.31 VISIT FOR SCREENING MAMMOGRAM: Primary | ICD-10-CM

## 2020-02-13 ENCOUNTER — OFFICE VISIT (OUTPATIENT)
Dept: INTERNAL MEDICINE | Facility: CLINIC | Age: 70
End: 2020-02-13

## 2020-02-13 VITALS
SYSTOLIC BLOOD PRESSURE: 122 MMHG | RESPIRATION RATE: 18 BRPM | BODY MASS INDEX: 40.55 KG/M2 | TEMPERATURE: 98.2 F | DIASTOLIC BLOOD PRESSURE: 80 MMHG | HEART RATE: 60 BPM | WEIGHT: 194 LBS

## 2020-02-13 DIAGNOSIS — I10 ESSENTIAL HYPERTENSION: ICD-10-CM

## 2020-02-13 DIAGNOSIS — R73.02 IMPAIRED GLUCOSE TOLERANCE: ICD-10-CM

## 2020-02-13 DIAGNOSIS — E78.5 HYPERLIPIDEMIA, UNSPECIFIED HYPERLIPIDEMIA TYPE: Primary | ICD-10-CM

## 2020-02-13 PROCEDURE — 99214 OFFICE O/P EST MOD 30 MIN: CPT | Performed by: INTERNAL MEDICINE

## 2020-02-13 NOTE — PROGRESS NOTES
Chief Complaint   Patient presents with   • Extended follow up       History of Present Illness    The patient presents for a follow-up related to hyperlipidemia. She is following a low fat diet. She reports that she is exercising. She is not taking medication for hyperlipidemia. She denies chest pain, shortness of breath, orthopnea, paroxysmal nocturnal dyspnea, dyspnea on exertion, edema, palpitations or syncope.    The patient presents for a follow-up related to hypertension. The patient reports that she has had no headaches or blurred vision. She states that she is taking her medication as prescribed. She is not having medication side effects.    The patient presents for a follow-up related to impaired glucose tolerance and reports that she doesn't check her blood sugars at home. She denies hypoglycemic symptoms. The patient denies polyuria, polydypsia or polyphagia. She reports no symptoms of neuropathy. The patient is not on medication for her impaired glucose tolerance. The patient does check her feet daily at home.    Review of Systems    CONSTITUTIONAL- Denies Unexplained Weight Loss, Fever, Chills, Sweats, Fatigue, Weakness or Malaise.    NECK- Denies Decreased Range of Motion, Stiffness, Thyroid Nodules, Enlarged Lymph Nodes or Goiter.    EYES- Denies Eye Pain, Eye Drainage, Eye Redness, Eye Discharge, Decreased Vision, Visual Disturbance, Diplopia, Visual Loss or Swollen Eyelid.    HENT- Denies Nasal Discharge, Sore Throat, Ear Pain, Ear Drainage, Sinus Pain, Nasal Congestion, Decreased Hearing or Tinnitus.    PULMONARY- Denies Wheezing, Sputum Production, Cough, Hemoptysis or Pleuritic Chest Pain.    GASTROINTESTINAL- Denies Abdominal Pain, Nausea, Vomiting, Diarrhea, Blood per Rectum, Constipation or Heartburn.    CARDIOVASCULAR- Denies Claudication or Irregular Heart Beat.    GENITOURINARY- Denies Dysuria, Hematuria, Urinary Frequency, Urinary Leakage, Pelvic Pain, Vaginal Prolapse, Vaginal Discharge,  Dyspareunia or Abnormal Menses.    ENDOCRINE- Denies Cold Intolerance, Depression, Hair Loss, Heat Intolerance, Memory Loss, Sleep Disturbance or Weight Gain.    NEUROLOGIC- Denies Seizures, Confusion or Excessive Daytime Sleepiness.    MUSCULOSKELETAL- Denies Joint Pain, Joint Stiffness, Decreased Range of Motion, Joint Swelling or Erythema of Joints.    INTEGUMENTARY- Denies Rash, Lumps, Sores, Itching, Dryness, Color Change, Changes in Hair, Brittle Nails, Discolored Nails, Thickened Nails, Growths or Alopecia.    Medications      Current Outpatient Medications:   •  aspirin tablet, Take 81 mg by mouth daily., Disp: , Rfl:   •  losartan-hydrochlorothiazide (HYZAAR) 50-12.5 MG per tablet, Take 1 tablet by mouth Daily., Disp: 30 tablet, Rfl: 5     Allergies    Allergies   Allergen Reactions   • Prilosec [Omeprazole] Myalgia   • Zofran [Ondansetron Hcl] Itching   • Cefdinir Rash   • Penicillins Rash       Problem List    Patient Active Problem List   Diagnosis   • Arthritis   • Hyperlipidemia   • Hyperglycemia   • Hypertension   • Morbid obesity (CMS/HCC)   • Muscular aches   • Venous insufficiency   • Osteoarthritis   • Low back pain   • Arthralgia   • Lumbar radiculopathy       Medications, Allergies, Problems List and Past History were reviewed and updated.    Physical Examination    /80   Pulse 60   Temp 98.2 °F (36.8 °C) (Temporal)   Resp 18   Wt 88 kg (194 lb)   LMP  (LMP Unknown)   Breastfeeding No   BMI 40.55 kg/m²     HEENT: Head- Normocephalic Atraumatic. Facies- Within normal limits. Pinnas- Normal texture and shape bilaterally. Canals- Normal bilaterally. TMs- Normal bilaterally. Nares- Patent bilaterally. Nasal Septum- is normal. There is no tenderness to palpation over the frontal or maxillary sinuses. Lids- Normal bilaterally. Conjunctiva- Clear bilaterally. Sclera- Anicteric bilaterally. Oropharynx- Moist with no lesions. Tonsils- No enlargement, erythema or exudate.    Neck: Thyroid-  non enlarged, symmetric and has no nodules. No bruits are detected. ROM- Normal Range of Motion with no rigidity.    Lungs: Auscultation- Clear to auscultation bilaterally. There are no retractions, clubbing or cyanosis. The Expiratory to Inspiratory ratio is equal.    Lymph Nodes: Cervical- no enlarged lymph nodes noted. Clavicular- no enlarged supraclavicular lymph nodes noted. Axillary- no enlarged axillary lymph nodes noted. Inguinal- no enlarged inguinal lymph nodes noted.    Cardiovascular: There are no carotid bruits. Heart- Normal Rate with Regular rhythm and no murmurs. There are no gallops. There are no rubs. In the lower extremities there is no edema. The upper extremities do not have edema.    Abdomen: Soft, benign, non-tender with no masses, hernias, organomegaly or scars.    Breast- Deferred. Patient Declined.    Impression and Assessment    Hyperlipidemia.    Essential Hypertension.    Impaired Glucose Tolerance.    Plan    Hyperlipidemia Plan: The patient was instructed to exercise daily and eat a low fat diet.    Essential Hypertension Plan: The current plan was continued.    Impaired Glucose Tolerance Plan: The condition is stable. No change is needed in the current plan.    Bharti was seen today for extended follow up.    Diagnoses and all orders for this visit:    Hyperlipidemia, unspecified hyperlipidemia type    Essential hypertension    Impaired glucose tolerance        Return to Office    The patient was instructed to return for follow-up in 1 year.    The patient was instructed to return sooner if the condition changes, worsens, or does not resolve.

## 2020-04-06 ENCOUNTER — TELEPHONE (OUTPATIENT)
Dept: INTERNAL MEDICINE | Facility: CLINIC | Age: 70
End: 2020-04-06

## 2020-04-06 RX ORDER — DICLOFENAC SODIUM 75 MG/1
75 TABLET, DELAYED RELEASE ORAL 2 TIMES DAILY PRN
Qty: 30 TABLET | Refills: 1 | Status: SHIPPED | OUTPATIENT
Start: 2020-04-06 | End: 2020-07-10

## 2020-04-06 NOTE — TELEPHONE ENCOUNTER
LMVM for pt that I was returning her call and to please call back.  Ofc. # given.     Just need to inform that rx was sent to her pharmacy.

## 2020-04-06 NOTE — TELEPHONE ENCOUNTER
Patient requested a prescription for diclofenac. Patient stated her  is prescribed diclofenac 75 MG tablets and she has taken them and they help her. Patient stated she has arthritis in her knees and they have been bothering her off and on for a couple of years.    Please call and advise. Patient call back   [phone]    [facility] on [address] in [facility]

## 2020-05-19 ENCOUNTER — APPOINTMENT (OUTPATIENT)
Dept: MAMMOGRAPHY | Facility: HOSPITAL | Age: 70
End: 2020-05-19

## 2020-07-10 RX ORDER — DICLOFENAC SODIUM 75 MG/1
TABLET, DELAYED RELEASE ORAL
Qty: 30 TABLET | Refills: 0 | Status: SHIPPED | OUTPATIENT
Start: 2020-07-10 | End: 2020-08-17

## 2020-08-17 RX ORDER — DICLOFENAC SODIUM 75 MG/1
TABLET, DELAYED RELEASE ORAL
Qty: 30 TABLET | Refills: 0 | Status: SHIPPED | OUTPATIENT
Start: 2020-08-17 | End: 2020-09-28

## 2020-09-02 ENCOUNTER — HOSPITAL ENCOUNTER (OUTPATIENT)
Dept: MAMMOGRAPHY | Facility: HOSPITAL | Age: 70
Discharge: HOME | End: 2020-09-02
Admitting: INTERNAL MEDICINE

## 2020-09-02 DIAGNOSIS — Z12.31 VISIT FOR SCREENING MAMMOGRAM: ICD-10-CM

## 2020-09-02 PROCEDURE — 77063 BREAST TOMOSYNTHESIS BI: CPT | Performed by: RADIOLOGY

## 2020-09-02 PROCEDURE — 77067 SCR MAMMO BI INCL CAD: CPT | Performed by: RADIOLOGY

## 2020-09-02 PROCEDURE — 77067 SCR MAMMO BI INCL CAD: CPT

## 2020-09-02 PROCEDURE — 77063 BREAST TOMOSYNTHESIS BI: CPT

## 2020-09-28 RX ORDER — DICLOFENAC SODIUM 75 MG/1
TABLET, DELAYED RELEASE ORAL
Qty: 30 TABLET | Refills: 0 | Status: SHIPPED | OUTPATIENT
Start: 2020-09-28 | End: 2020-10-30

## 2020-10-30 RX ORDER — DICLOFENAC SODIUM 75 MG/1
TABLET, DELAYED RELEASE ORAL
Qty: 30 TABLET | Refills: 0 | Status: SHIPPED | OUTPATIENT
Start: 2020-10-30 | End: 2021-01-12

## 2020-12-29 ENCOUNTER — TELEPHONE (OUTPATIENT)
Dept: INTERNAL MEDICINE | Facility: CLINIC | Age: 70
End: 2020-12-29

## 2020-12-29 RX ORDER — LIDOCAINE 40 MG/G
CREAM TOPICAL AS NEEDED
Qty: 45 G | Refills: 0 | Status: SHIPPED | OUTPATIENT
Start: 2020-12-29 | End: 2021-06-09

## 2020-12-29 NOTE — TELEPHONE ENCOUNTER
Caller: Bharti Haines    Relationship to patient: Self    Best call back number:  [phone]    Where are you experiencing symptoms: [facility]    How long have you been experiencing symptoms:   2 WEEKS     Have you had any recent surgeries, procedures or injections: [x] Yes  [] No   If yes, explain: SURGERY IN 2015 BECAUSE OF A FALL    Is it the symptoms constant or intermittent: [x] Constant  [] Intermittent   What makes it worse: SLEEPING   What makes it better: ICE    PATIENT HAS A VIRTUAL VISIT SCHEDULED ON 1-6-21  PLEASE ADVISE IF SHE CAN BE SEEN EARLIER TO GET SOME RELIEF     What therapies/medications have you tried: DICLOFENAC, GABAPENTIN     If a prescription is needed, what is your preferred pharmacy:   [facility] [address] - [address] [facility] - [phone] [place] [phone]   [address] [facility] [address]  Phone: [phone] Fax: [phone]

## 2020-12-29 NOTE — TELEPHONE ENCOUNTER
I do not even see gabapentin listed on her chart.  Who has been prescribing this?  She can try lidocaine cream; Rx sent.  Also recommend warm, moist heat for 10 to 15 minutes 3 times a day in addition to alternating with ice.    If there is any availability in the next week we can try to have her seen sooner but we have several providers out currently which is limiting her appointment availability.

## 2020-12-30 NOTE — TELEPHONE ENCOUNTER
Pt informed of message from Dr. Baron. Pt verbalized good understanding.   Pt added she has been taking her 's gabapentin which has been giving her some relief and is not sure if VV with [facility] is even necessary since it's not in person. Informed pt with the VV physician are able to evaluate accordingly.   Advised prescribed Rx for someone else is not good to use. Told Pt would make note in chart and forward message to Dr. Baron.

## 2021-01-12 RX ORDER — DICLOFENAC SODIUM 75 MG/1
TABLET, DELAYED RELEASE ORAL
Qty: 30 TABLET | Refills: 0 | Status: SHIPPED | OUTPATIENT
Start: 2021-01-12 | End: 2021-02-21

## 2021-01-12 NOTE — TELEPHONE ENCOUNTER
LOV:02/13/2020  NOV:02/16/2021  Labs:01/27/2020     Return in about 1 year (around 2/13/2021) for Mikala Guerrero.

## 2021-02-10 ENCOUNTER — HOSPITAL ENCOUNTER (OUTPATIENT)
Dept: GENERAL RADIOLOGY | Facility: HOSPITAL | Age: 71
Discharge: HOME | End: 2021-02-10
Admitting: INTERNAL MEDICINE

## 2021-02-10 ENCOUNTER — OFFICE VISIT (OUTPATIENT)
Dept: INTERNAL MEDICINE | Facility: CLINIC | Age: 71
End: 2021-02-10

## 2021-02-10 VITALS
RESPIRATION RATE: 22 BRPM | DIASTOLIC BLOOD PRESSURE: 78 MMHG | SYSTOLIC BLOOD PRESSURE: 146 MMHG | HEART RATE: 72 BPM | TEMPERATURE: 98 F | BODY MASS INDEX: 40.55 KG/M2 | WEIGHT: 194 LBS

## 2021-02-10 DIAGNOSIS — G89.29 CHRONIC MIDLINE LOW BACK PAIN WITH BILATERAL SCIATICA: Primary | ICD-10-CM

## 2021-02-10 DIAGNOSIS — M54.41 CHRONIC MIDLINE LOW BACK PAIN WITH BILATERAL SCIATICA: ICD-10-CM

## 2021-02-10 DIAGNOSIS — G89.29 CHRONIC MIDLINE LOW BACK PAIN WITH BILATERAL SCIATICA: ICD-10-CM

## 2021-02-10 DIAGNOSIS — E78.5 HYPERLIPIDEMIA, UNSPECIFIED HYPERLIPIDEMIA TYPE: ICD-10-CM

## 2021-02-10 DIAGNOSIS — R73.02 IMPAIRED GLUCOSE TOLERANCE: ICD-10-CM

## 2021-02-10 DIAGNOSIS — M54.42 CHRONIC MIDLINE LOW BACK PAIN WITH BILATERAL SCIATICA: ICD-10-CM

## 2021-02-10 DIAGNOSIS — M54.42 CHRONIC MIDLINE LOW BACK PAIN WITH BILATERAL SCIATICA: Primary | ICD-10-CM

## 2021-02-10 DIAGNOSIS — M54.41 CHRONIC MIDLINE LOW BACK PAIN WITH BILATERAL SCIATICA: Primary | ICD-10-CM

## 2021-02-10 DIAGNOSIS — I10 ESSENTIAL HYPERTENSION: ICD-10-CM

## 2021-02-10 LAB
ALBUMIN SERPL-MCNC: 4.1 G/DL (ref 3.5–5.2)
ALBUMIN/GLOB SERPL: 1.4 G/DL
ALP SERPL-CCNC: 77 U/L (ref 39–117)
ALT SERPL W P-5'-P-CCNC: 21 U/L (ref 1–33)
ANION GAP SERPL CALCULATED.3IONS-SCNC: 9.9 MMOL/L (ref 5–15)
AST SERPL-CCNC: 17 U/L (ref 1–32)
BILIRUB SERPL-MCNC: 0.8 MG/DL (ref 0–1.2)
BUN SERPL-MCNC: 19 MG/DL (ref 8–23)
BUN/CREAT SERPL: 20.2 (ref 7–25)
CALCIUM SPEC-SCNC: 9.7 MG/DL (ref 8.6–10.5)
CHLORIDE SERPL-SCNC: 100 MMOL/L (ref 98–107)
CHOLEST SERPL-MCNC: 219 MG/DL (ref 0–200)
CO2 SERPL-SCNC: 27.1 MMOL/L (ref 22–29)
CREAT SERPL-MCNC: 0.94 MG/DL (ref 0.57–1)
GFR SERPL CREATININE-BSD FRML MDRD: 59 ML/MIN/1.73
GLOBULIN UR ELPH-MCNC: 3 GM/DL
GLUCOSE SERPL-MCNC: 90 MG/DL (ref 65–99)
HBA1C MFR BLD: 5.6 % (ref 4.8–5.6)
HDLC SERPL-MCNC: 49 MG/DL (ref 40–60)
LDLC SERPL CALC-MCNC: 148 MG/DL (ref 0–100)
LDLC/HDLC SERPL: 2.96 {RATIO}
POTASSIUM SERPL-SCNC: 4.5 MMOL/L (ref 3.5–5.2)
PROT SERPL-MCNC: 7.1 G/DL (ref 6–8.5)
SODIUM SERPL-SCNC: 137 MMOL/L (ref 136–145)
TRIGL SERPL-MCNC: 125 MG/DL (ref 0–150)
VLDLC SERPL-MCNC: 22 MG/DL (ref 5–40)

## 2021-02-10 PROCEDURE — 36415 COLL VENOUS BLD VENIPUNCTURE: CPT | Performed by: INTERNAL MEDICINE

## 2021-02-10 PROCEDURE — 83036 HEMOGLOBIN GLYCOSYLATED A1C: CPT | Performed by: INTERNAL MEDICINE

## 2021-02-10 PROCEDURE — 80061 LIPID PANEL: CPT | Performed by: INTERNAL MEDICINE

## 2021-02-10 PROCEDURE — 99214 OFFICE O/P EST MOD 30 MIN: CPT | Performed by: INTERNAL MEDICINE

## 2021-02-10 PROCEDURE — 80053 COMPREHEN METABOLIC PANEL: CPT | Performed by: INTERNAL MEDICINE

## 2021-02-10 PROCEDURE — 72100 X-RAY EXAM L-S SPINE 2/3 VWS: CPT

## 2021-02-10 RX ORDER — METHYLPREDNISOLONE 4 MG/1
TABLET ORAL
Qty: 21 TABLET | Refills: 0 | Status: SHIPPED | OUTPATIENT
Start: 2021-02-10 | End: 2021-06-09

## 2021-02-10 RX ORDER — GABAPENTIN 300 MG/1
300 CAPSULE ORAL 3 TIMES DAILY
Qty: 90 CAPSULE | Refills: 0 | Status: SHIPPED | OUTPATIENT
Start: 2021-02-10 | End: 2021-03-23

## 2021-02-10 NOTE — PROGRESS NOTES
Chief Complaint   Patient presents with   • Follow-up     Follow up chronic medical problems       History of Present Illness      The patient presents for a follow-up related to low back pain which began one month ago. She states that the pain was first noted to come on insidiously. The pain is not associated with fever, chills, weight loss, rashes, dysuria, hematuria or a decreased urine stream. The patient denies a past history of malignancy. The pain radiates across the low back, to the right buttock, to the left buttock, to the right thigh and to the left thigh. There is numbness. The sensory loss is to the buttocks and to the thighs. The patient denies loss of bladder control. The patient denies loss of bowel control. The patient has attempted heat, ice, stretches and Non-Steroidals. The heat did not give pain relief. The ice did not give pain relief. The exercise and stretching did not give pain relief. The Non Steroidal AntiInflammatories did not give relief of the pain. The patient reports sitting aggravates the symptoms.    The patient presents for a follow-up related to hyperlipidemia. She is following a low fat diet. She reports that she is not exercising. She is not taking medication for hyperlipidemia. She denies chest pain, shortness of breath, orthopnea, paroxysmal nocturnal dyspnea, dyspnea on exertion, edema, palpitations or syncope.    The patient presents for a follow-up related to hypertension. The patient reports that she has had no headaches or blurred vision. She states that she is taking her medication as prescribed. She is not having medication side effects.    The patient presents for a follow-up related to impaired glucose tolerance. She does not check her blood sugars at home. She denies hypoglycemic symptoms. The patient denies polyuria, polydypsia or polyphagia. She reports no symptoms of neuropathy. The patient is not on medication for her impaired glucose tolerance. The patient does  check her feet daily at home.    Review of Systems    CONSTITUTIONAL- Denies Sweats, Fatigue, Weakness or Malaise.    PULMONARY- Denies Wheezing, Sputum Production, Cough, Hemoptysis or Pleuritic Chest Pain.    GASTROINTESTINAL- Denies Abdominal Pain, Nausea, Vomiting, Diarrhea, Blood per Rectum, Constipation or Heartburn.    CARDIOVASCULAR- Denies Claudication or Irregular Heart Beat.    Medications      Current Outpatient Medications:   •  aspirin tablet, Take 81 mg by mouth daily., Disp: , Rfl:   •  diclofenac (VOLTAREN) 75 MG EC tablet, TAKE ONE TABLET BY MOUTH TWICE A DAY AS NEEDED FOR PAIN, Disp: 30 tablet, Rfl: 0  •  losartan-hydrochlorothiazide (HYZAAR) 50-12.5 MG per tablet, Take 1 tablet by mouth Daily., Disp: 30 tablet, Rfl: 5  •  gabapentin (NEURONTIN) 300 MG capsule, Take 1 capsule by mouth 3 (Three) Times a Day., Disp: 90 capsule, Rfl: 0  •  lidocaine (LMX) 4 % cream, Apply  topically to the appropriate area as directed As Needed for Mild Pain ., Disp: 45 g, Rfl: 0  •  methylPREDNISolone (MEDROL) 4 MG dose pack, Take as directed on package instructions., Disp: 21 tablet, Rfl: 0     Allergies    Allergies   Allergen Reactions   • Prilosec [Omeprazole] Myalgia   • Zofran [Ondansetron Hcl] Itching   • Cefdinir Rash   • Penicillins Rash       Problem List    Patient Active Problem List   Diagnosis   • Arthritis   • Hyperlipidemia   • Hyperglycemia   • Hypertension   • Morbid obesity (CMS/HCC)   • Muscular aches   • Venous insufficiency   • Osteoarthritis   • Low back pain   • Arthralgia   • Lumbar radiculopathy       Medications, Allergies, Problems List and Past History were reviewed and updated.    Physical Examination    /78 (BP Location: Left arm, Patient Position: Sitting, Cuff Size: Adult)   Pulse 72   Temp 98 °F (36.7 °C) (Infrared)   Resp 22   Wt 88 kg (194 lb)   LMP  (LMP Unknown)   Breastfeeding No   BMI 40.55 kg/m²     HEENT: Head- Normocephalic Atraumatic. Facies- Within normal  limits. Pinnas- Normal texture and shape bilaterally. Canals- Normal bilaterally. TMs- Normal bilaterally. Nares- Patent bilaterally. Nasal Septum- is normal. There is no tenderness to palpation over the frontal or maxillary sinuses. Lids- Normal bilaterally. Conjunctiva- Clear bilaterally. Sclera- Anicteric bilaterally. Oropharynx- Moist with no lesions. Tonsils- No enlargement, erythema or exudate.    Neck: Thyroid- non enlarged, symmetric and has no nodules. No bruits are detected. ROM- Normal Range of Motion with no rigidity.    Lungs: Auscultation- Clear to auscultation bilaterally. There are no retractions, clubbing or cyanosis. The Expiratory to Inspiratory ratio is equal.    Cardiovascular: There are no carotid bruits. Heart- Normal Rate with Regular rhythm and no murmurs. There are no gallops. There are no rubs. In the lower extremities there is no edema. The upper extremities do not have edema.    Abdomen: Soft, benign, non-tender with no masses, hernias, organomegaly or scars.    Back: The patient has a normal spinal curvature with no evidence of scoliosis. There are no skin lesions noted on the back. Palpation reveals no tenderness and normal muscle tone. The straight leg raising test is negative. The back has decreased flexion, pain with flexion, decreased extension and pain with extension.    Lower Extremity Neuro Exam: Muscle Strength is normal (5/5) in all major lower extremity muscle groups except the R hip abductors, L hip abductors, R hip adductors, L hip adductors, R hip flexors, L hip flexors, R hip extensors and L hip extensors. The right hip abductors have 4/5 strength. The left hip abductors have 4/5 strength. The right hip adductors have 4/5 strength. The left hip adductors have 4/5 strength. The right hip flexors have 4/5 strength. The left hip flexors have 4/5 strength. The right hip extensors have 4/5 strength. The left hip extensors have 4/5 strength. Deep Tendon Reflexes are 2+ and  equal in the patellar and Achilles distributions bilaterally. Sensation is normal in all distributions except the right L2, right L3, right L4, left L2, left L3 and left L4 with deficits noted to light touch.    Impression and Assessment    Low Back Pain with Radiculopathy.    Hyperlipidemia.    Essential Hypertension.    Impaired Glucose Tolerance.    Plan    Hyperlipidemia Plan: She was instructed to eat a low fat diet. She was encouraged to exercise daily.    Essential Hypertension Plan: The current plan was continued.    Impaired Glucose Tolerance Plan: Further plans will be formulated after test results are reviewed.    Low Back Pain with Radiculopathy Plan: Medication will be added as noted. An MRI will be obtained.    Diagnoses and all orders for this visit:    1. Chronic midline low back pain with bilateral sciatica (Primary)  -     XR Spine Lumbar 2 or 3 View; Future  -     Comprehensive Metabolic Panel  -     methylPREDNISolone (MEDROL) 4 MG dose pack; Take as directed on package instructions.  Dispense: 21 tablet; Refill: 0  -     gabapentin (NEURONTIN) 300 MG capsule; Take 1 capsule by mouth 3 (Three) Times a Day.  Dispense: 90 capsule; Refill: 0  -     MRI Lumbar Spine Without Contrast; Future    2. Hyperlipidemia, unspecified hyperlipidemia type  -     Comprehensive Metabolic Panel  -     Lipid Panel    3. Essential hypertension  -     Comprehensive Metabolic Panel  -     POC Urinalysis Dipstick, Automated    4. Impaired glucose tolerance  -     Comprehensive Metabolic Panel  -     Hemoglobin A1c  -     POC Microalbumin          Return to Office    The patient was instructed to return for follow-up in 4 months. Next Visit: Follow-up and Subsequent Medicare Annual Wellness Visit. The patient was instructed to return sooner if the condition changes, worsens, or does not resolve.

## 2021-02-19 ENCOUNTER — HOSPITAL ENCOUNTER (OUTPATIENT)
Dept: MRI IMAGING | Facility: HOSPITAL | Age: 71
Discharge: HOME | End: 2021-02-19
Admitting: INTERNAL MEDICINE

## 2021-02-19 DIAGNOSIS — M54.41 CHRONIC MIDLINE LOW BACK PAIN WITH BILATERAL SCIATICA: ICD-10-CM

## 2021-02-19 DIAGNOSIS — G89.29 CHRONIC MIDLINE LOW BACK PAIN WITH BILATERAL SCIATICA: ICD-10-CM

## 2021-02-19 DIAGNOSIS — M54.42 CHRONIC MIDLINE LOW BACK PAIN WITH BILATERAL SCIATICA: ICD-10-CM

## 2021-02-19 PROCEDURE — 72148 MRI LUMBAR SPINE W/O DYE: CPT

## 2021-02-21 RX ORDER — DICLOFENAC SODIUM 75 MG/1
TABLET, DELAYED RELEASE ORAL
Qty: 30 TABLET | Refills: 0 | Status: SHIPPED | OUTPATIENT
Start: 2021-02-21 | End: 2021-04-05

## 2021-02-25 ENCOUNTER — TELEPHONE (OUTPATIENT)
Dept: INTERNAL MEDICINE | Facility: CLINIC | Age: 71
End: 2021-02-25

## 2021-02-25 DIAGNOSIS — M48.00 SPINAL STENOSIS, UNSPECIFIED SPINAL REGION: Primary | ICD-10-CM

## 2021-03-02 NOTE — TELEPHONE ENCOUNTER
Patient notified of MRI results and referral to neurosurgery. Patient verbalized understanding and will await referral appt.

## 2021-03-02 NOTE — TELEPHONE ENCOUNTER
The MRI reveals severe spinal stenosis.  I would recommend that she see a neurosurgeon.  I have put in an order.  Marshal Guerrero MD  22:21 EST  03/01/21

## 2021-03-08 ENCOUNTER — TELEPHONE (OUTPATIENT)
Dept: NEUROSURGERY | Facility: CLINIC | Age: 71
End: 2021-03-08

## 2021-03-08 NOTE — TELEPHONE ENCOUNTER
Caller: VANNESSA RENTERIA    Relationship to patient: SELF    Best call back number: [phone]    Patient is needing: THE PATIENT IS SCHEDULED TO SEE DR. MILLER ON 3/30 AND WANTED TO KNOW IF HER  CAN COME WITH HER TO THE APPOINTMENT. SHE SAID SHE CAN'T COME TO THE APPOINTMENT ALONG AND WILL NEED HER  TO HELP HER IN WITH A WHEELCHAIR. PLEASE ADVISE.

## 2021-03-23 DIAGNOSIS — M54.42 CHRONIC MIDLINE LOW BACK PAIN WITH BILATERAL SCIATICA: ICD-10-CM

## 2021-03-23 DIAGNOSIS — M54.41 CHRONIC MIDLINE LOW BACK PAIN WITH BILATERAL SCIATICA: ICD-10-CM

## 2021-03-23 DIAGNOSIS — G89.29 CHRONIC MIDLINE LOW BACK PAIN WITH BILATERAL SCIATICA: ICD-10-CM

## 2021-03-23 RX ORDER — GABAPENTIN 300 MG/1
CAPSULE ORAL
Qty: 90 CAPSULE | Refills: 2 | Status: SHIPPED | OUTPATIENT
Start: 2021-03-23 | End: 2021-12-09

## 2021-03-29 ENCOUNTER — TELEPHONE (OUTPATIENT)
Dept: INTERNAL MEDICINE | Facility: CLINIC | Age: 71
End: 2021-03-29

## 2021-03-29 NOTE — TELEPHONE ENCOUNTER
PATIENT CALLED AND STATED THAT SHE HAS APPOINTMENT WITH DR. MILLER TOMORROW AT 11:00AM AND DR. MILLER HAS REQUESTED A WRITTEN REPORT     FAX: [phone]    PLEASE CALL PATIENT: [phone]     WHEN AND IF THIS CAN BE DONE PRIOR TO APPOINTMENT TOMORROW.

## 2021-03-29 NOTE — TELEPHONE ENCOUNTER
I'm not quite sure what this means but Dr. Haley has access to my entire record in the chart.  Marshal Guerrero MD  17:43 EDT  03/29/21

## 2021-03-30 NOTE — TELEPHONE ENCOUNTER
Lvm for pt  [phone] to please call me back about this referral and message    Called to ask her to clarify what she was told that Dr Haley needs as his office stated they cant see anything that he has requested and if pt was scheduled than they have everything they need and that Dr Haley can see all Dr Guerrero's records as well.        Jovanni-antoniot has been r/s for 4/9/2021    HUB-ok to read message to pt

## 2021-03-30 NOTE — TELEPHONE ENCOUNTER
Caller: Bharti Haines    Relationship: Self    Best call back number: [phone]     What was the call regarding: PATIENT RETURNED CALL.  MESSAGE WAS READ TO PATIENT.

## 2021-03-30 NOTE — TELEPHONE ENCOUNTER
Per Kiki at [facility]-she cant see anything that Dr Haley needs and said if it was ready to [place] they probably arent waiting on anything

## 2021-03-31 ENCOUNTER — TELEPHONE (OUTPATIENT)
Dept: INTERNAL MEDICINE | Facility: CLINIC | Age: 71
End: 2021-03-31

## 2021-03-31 RX ORDER — HYDROCODONE BITARTRATE AND ACETAMINOPHEN 7.5; 325 MG/1; MG/1
1 TABLET ORAL EVERY 8 HOURS PRN
Qty: 24 TABLET | Refills: 0 | Status: SHIPPED | OUTPATIENT
Start: 2021-03-31 | End: 2021-09-03

## 2021-03-31 NOTE — TELEPHONE ENCOUNTER
Caller: Zaki Bharti JOHN    Relationship: Self    Best call back number: [phone]    What medication are you requesting: PAIN MANAGEMENT    What are your current symptoms: SCIATIC PAIN    How long have you been experiencing symptoms: A WHILE    Have you had these symptoms before:    [x] Yes  [] No    Have you been treated for these symptoms before:   [x] Yes  [] No    If a prescription is needed, what is your preferred pharmacy and phone number: [facility]  CONFIRMED    Additional notes: PATIENT STATED THAT HER APPOINTMENT WITH  WAS CANCELLED MARCH 30,21 AND IS GOING OUT OF TOWN THIS WEEKEND    PATIENT STATED THAT SHE IS IN A LOT OF PAIN FROM HER SCIATIC NERVE AND WOULD LIKE TO KNOW IF THERE WAS ANYTHING THAT  COULD PRESCRIBE TO HER THAT WOULD HELP EASE THIS UNTIL SHE GETS TO  ON April 9, 21    PLEASE ADVISE

## 2021-03-31 NOTE — TELEPHONE ENCOUNTER
Called patient.  Called in lortab to help with pain until appt with neurosurgeon.  Marshal Guerrero MD  17:23 EDT  03/31/21

## 2021-04-05 ENCOUNTER — OFFICE VISIT (OUTPATIENT)
Dept: NEUROSURGERY | Facility: CLINIC | Age: 71
End: 2021-04-05

## 2021-04-05 VITALS
DIASTOLIC BLOOD PRESSURE: 82 MMHG | SYSTOLIC BLOOD PRESSURE: 128 MMHG | WEIGHT: 190 LBS | OXYGEN SATURATION: 98 % | HEIGHT: 58 IN | RESPIRATION RATE: 16 BRPM | HEART RATE: 56 BPM | BODY MASS INDEX: 39.88 KG/M2 | TEMPERATURE: 96.8 F

## 2021-04-05 DIAGNOSIS — E66.01 CLASS 2 SEVERE OBESITY DUE TO EXCESS CALORIES WITH SERIOUS COMORBIDITY AND BODY MASS INDEX (BMI) OF 39.0 TO 39.9 IN ADULT (HCC): ICD-10-CM

## 2021-04-05 DIAGNOSIS — M48.062 SPINAL STENOSIS, LUMBAR REGION, WITH NEUROGENIC CLAUDICATION: Primary | ICD-10-CM

## 2021-04-05 DIAGNOSIS — I82.4Z1 ACUTE EMBOLISM AND THROMBOSIS OF UNSPECIFIED DEEP VEINS OF RIGHT DISTAL LOWER EXTREMITY (HCC): ICD-10-CM

## 2021-04-05 PROCEDURE — 99204 OFFICE O/P NEW MOD 45 MIN: CPT | Performed by: NEUROLOGICAL SURGERY

## 2021-04-05 NOTE — PROGRESS NOTES
Subjective     Chief Complaint: Back pain    Patient ID: Bharti Haines is a 71 y.o. female seen for consultation today at the request of  Marshal Guerrero MD    History of Present Illness    This is a 71-year-old woman who presents to my office with chief complaints of bilateral leg pain and weakness.  The symptoms have been getting progressively worse over the course of the last 6-8 months.  She is under the care of Dr. Haney of pain management.  He has performed some injections in her lower back without relief.  She has not tried physical therapy.    She had a left-sided L4-5 microdiscectomy with Dr. Montejo in October 2015.  She had significant improvement in her sciatica at that time.    Her medical comorbidities are significant for morbid obesity, hypertension, and a prior history of lumbar discectomy    The following portions of the patient's history were reviewed and updated as appropriate: allergies, current medications, past family history, past medical history, past social history, past surgical history and problem list.    Family history:   Family History   Problem Relation Age of Onset   • Cancer Mother    • Breast cancer Mother         pt states 60's   • Stroke Mother    • Heart disease Father    • Stroke Father    • Diabetes Sister    • Hypertension Sister    • Osteoarthritis Sister    • Osteoporosis Sister    • Arthritis Other    • Breast cancer Maternal Aunt         pt states 60's   • Ovarian cancer Neg Hx        Social history:   Social History     Socioeconomic History   • Marital status:      Spouse name: Not on file   • Number of children: Not on file   • Years of education: Not on file   • Highest education level: Not on file   Tobacco Use   • Smoking status: Never Smoker   • Smokeless tobacco: Never Used   Vaping Use   • Vaping Use: Never used   Substance and Sexual Activity   • Alcohol use: No   • Drug use: No   • Sexual activity: Defer       Review of Systems    Objective   Blood  "pressure 128/82, pulse 56, temperature 96.8 °F (36 °C), resp. rate 16, height 147.3 cm (58\"), weight 86.2 kg (190 lb), SpO2 98 %, not currently breastfeeding.  Body mass index is 39.71 kg/m².    Physical Exam  Constitutional:       General: She is not in acute distress.     Appearance: She is well-developed. She is not diaphoretic.      Comments: Morbid abdominal obesity.  Ambulates with a cane.   HENT:      Head: Normocephalic and atraumatic.   Pulmonary:      Effort: Pulmonary effort is normal.   Skin:     General: Skin is warm and dry.   Neurological:      Mental Status: She is alert and oriented to person, place, and time.      Cranial Nerves: No cranial nerve deficit.         Assessment/Plan     Independent Review of Radiographic Studies:      Available for my review is an MRI of the lumbar spine which was performed on February 19, 2021.  This demonstrates multilevel degenerative disc disease which is most severe at L1-2 and L4-5.  At L4-5 there is severe central canal and lateral recess stenosis secondary to a broad-based bulging disc and severe facet hypertrophy.  There is moderate lateral recess and central canal stenosis at L5-S1.  The degenerative disc at L1 to with the associated disc osteophyte complex does not appear to significantly encroach upon the central canal or lateral recesses.  There is bunching of the nerve roots which is presumably due to the severe stenosis at L4-5.    Medical Decision Making:      This is a 71-year-old woman with severe lumbar stenosis and bilateral L5 radiculopathies.  I had an open and sebastián conversation with her about her diagnosis and the likely treatment options.  She is a relatively poor historian and its not clear to me over what course of time she is undergone epidural steroid injections with her pain management doctor.  I think in all likelihood she is probably looking at a revision spine surgery, specifically an L4 laminectomy.  Given her prior operation and her " morbid obesity, the risk of complications from the surgery is slightly higher than it was for her first microdiscectomy.  I had a difficult time communicating with the patient regarding her treatment options and the risk/benefit profile of her surgery.  I think she probably would benefit from an L4 laminectomy and bilateral proximal L5 nerve root decompression.  I think she probably is at increased risk of infection, spinal fluid leak, and nerve root injury, but this can probably safely be done with an acceptable risk profile.  Before surgery she would need to have flexion-extension films to ensure that she does not have any instability.  I do not appreciate any spondylolisthesis on her MRI, but would require this prior to proceeding with surgery.  She was not able to come to a decision during her visit today which lasted at least 45 minutes, so I recommended that she try physical therapy and return to Dr. Pollard.  When she is ready for surgery I think I can definitely help with her buttock and leg pain and possibly her leg weakness.  I will follow-up with her in a few months after she is undergone some therapy and some injections.  If she would like to proceed with surgery I think I would like to see her back given her difficulty with comprehending my recommendations today despite numerous efforts on my part to try to communicate them to her.    Diagnoses and all orders for this visit:    1. Spinal stenosis, lumbar region, with neurogenic claudication (Primary)  -     Ambulatory Referral to Physical Therapy Evaluate and treat; Stretching, ROM, Strengthening    2. Acute embolism and thrombosis of unspecified deep veins of right distal lower extremity (CMS/HCC)    3. Class 2 severe obesity due to excess calories with serious comorbidity and body mass index (BMI) of 39.0 to 39.9 in adult (CMS/HCC)        No follow-ups on file.           This document signed by MEI Jeong MD April 5, 2021 14:13 EDT

## 2021-04-06 RX ORDER — DICLOFENAC SODIUM 75 MG/1
TABLET, DELAYED RELEASE ORAL
Qty: 30 TABLET | Refills: 2 | Status: SHIPPED | OUTPATIENT
Start: 2021-04-06 | End: 2021-06-28

## 2021-04-20 ENCOUNTER — TELEPHONE (OUTPATIENT)
Dept: INTERNAL MEDICINE | Facility: CLINIC | Age: 71
End: 2021-04-20

## 2021-04-20 DIAGNOSIS — M48.00 SPINAL STENOSIS, UNSPECIFIED SPINAL REGION: ICD-10-CM

## 2021-04-20 DIAGNOSIS — M54.41 CHRONIC MIDLINE LOW BACK PAIN WITH BILATERAL SCIATICA: Primary | ICD-10-CM

## 2021-04-20 DIAGNOSIS — G89.29 CHRONIC MIDLINE LOW BACK PAIN WITH BILATERAL SCIATICA: Primary | ICD-10-CM

## 2021-04-20 DIAGNOSIS — M54.42 CHRONIC MIDLINE LOW BACK PAIN WITH BILATERAL SCIATICA: Primary | ICD-10-CM

## 2021-04-20 NOTE — TELEPHONE ENCOUNTER
Caller: Bharti Haines    Relationship: Self    Best call back number: [phone]    What is the medical concern/diagnosis: PAIN SHOT IN BACK    What specialty or service is being requested: OV    What is the provider, practice or medical service name: DR PAREDES    Any additional details: [phone]  NEED REFERRAL TO DR NADEGE PAREDES

## 2021-05-21 ENCOUNTER — TRANSCRIBE ORDERS (OUTPATIENT)
Dept: ADMINISTRATIVE | Facility: HOSPITAL | Age: 71
End: 2021-05-21

## 2021-05-21 DIAGNOSIS — Z12.31 VISIT FOR SCREENING MAMMOGRAM: Primary | ICD-10-CM

## 2021-06-02 ENCOUNTER — OFFICE VISIT (OUTPATIENT)
Dept: INTERNAL MEDICINE | Facility: CLINIC | Age: 71
End: 2021-06-02

## 2021-06-02 VITALS
HEIGHT: 58 IN | TEMPERATURE: 97.8 F | RESPIRATION RATE: 15 BRPM | BODY MASS INDEX: 39.71 KG/M2 | SYSTOLIC BLOOD PRESSURE: 110 MMHG | DIASTOLIC BLOOD PRESSURE: 60 MMHG | OXYGEN SATURATION: 97 % | HEART RATE: 72 BPM

## 2021-06-02 DIAGNOSIS — R11.0 NAUSEA: Primary | ICD-10-CM

## 2021-06-02 DIAGNOSIS — R63.0 DECREASED APPETITE: ICD-10-CM

## 2021-06-02 DIAGNOSIS — Z86.19 HISTORY OF HELICOBACTER PYLORI INFECTION: ICD-10-CM

## 2021-06-02 LAB
ALBUMIN SERPL-MCNC: 4.5 G/DL (ref 3.5–5.2)
ALBUMIN/GLOB SERPL: 1.6 G/DL
ALP SERPL-CCNC: 72 U/L (ref 39–117)
ALT SERPL W P-5'-P-CCNC: 20 U/L (ref 1–33)
ANION GAP SERPL CALCULATED.3IONS-SCNC: 13.5 MMOL/L (ref 5–15)
AST SERPL-CCNC: 21 U/L (ref 1–32)
BASOPHILS # BLD AUTO: 0.02 10*3/MM3 (ref 0–0.2)
BASOPHILS NFR BLD AUTO: 0.3 % (ref 0–1.5)
BILIRUB BLD-MCNC: NEGATIVE MG/DL
BILIRUB SERPL-MCNC: 1.1 MG/DL (ref 0–1.2)
BUN SERPL-MCNC: 13 MG/DL (ref 8–23)
BUN/CREAT SERPL: 17.1 (ref 7–25)
CALCIUM SPEC-SCNC: 9.8 MG/DL (ref 8.6–10.5)
CHLORIDE SERPL-SCNC: 91 MMOL/L (ref 98–107)
CLARITY, POC: CLEAR
CO2 SERPL-SCNC: 23.5 MMOL/L (ref 22–29)
COLOR UR: YELLOW
CREAT SERPL-MCNC: 0.76 MG/DL (ref 0.57–1)
DEPRECATED RDW RBC AUTO: 38.7 FL (ref 37–54)
EOSINOPHIL # BLD AUTO: 0.03 10*3/MM3 (ref 0–0.4)
EOSINOPHIL NFR BLD AUTO: 0.4 % (ref 0.3–6.2)
ERYTHROCYTE [DISTWIDTH] IN BLOOD BY AUTOMATED COUNT: 12.5 % (ref 12.3–15.4)
EXPIRATION DATE: NORMAL
GFR SERPL CREATININE-BSD FRML MDRD: 75 ML/MIN/1.73
GLOBULIN UR ELPH-MCNC: 2.8 GM/DL
GLUCOSE SERPL-MCNC: 111 MG/DL (ref 65–99)
GLUCOSE UR STRIP-MCNC: NEGATIVE MG/DL
HCT VFR BLD AUTO: 39.2 % (ref 34–46.6)
HGB BLD-MCNC: 14.2 G/DL (ref 12–15.9)
IMM GRANULOCYTES # BLD AUTO: 0.02 10*3/MM3 (ref 0–0.05)
IMM GRANULOCYTES NFR BLD AUTO: 0.3 % (ref 0–0.5)
KETONES UR QL: NEGATIVE
LEUKOCYTE EST, POC: NEGATIVE
LIPASE SERPL-CCNC: 30 U/L (ref 13–60)
LYMPHOCYTES # BLD AUTO: 1 10*3/MM3 (ref 0.7–3.1)
LYMPHOCYTES NFR BLD AUTO: 13.9 % (ref 19.6–45.3)
Lab: NORMAL
MCH RBC QN AUTO: 31.1 PG (ref 26.6–33)
MCHC RBC AUTO-ENTMCNC: 36.2 G/DL (ref 31.5–35.7)
MCV RBC AUTO: 85.8 FL (ref 79–97)
MONOCYTES # BLD AUTO: 0.6 10*3/MM3 (ref 0.1–0.9)
MONOCYTES NFR BLD AUTO: 8.3 % (ref 5–12)
NEUTROPHILS NFR BLD AUTO: 5.54 10*3/MM3 (ref 1.7–7)
NEUTROPHILS NFR BLD AUTO: 76.8 % (ref 42.7–76)
NITRITE UR-MCNC: NEGATIVE MG/ML
NRBC BLD AUTO-RTO: 0 /100 WBC (ref 0–0.2)
PH UR: 7 [PH] (ref 5–8)
PLATELET # BLD AUTO: 263 10*3/MM3 (ref 140–450)
PMV BLD AUTO: 10.1 FL (ref 6–12)
POTASSIUM SERPL-SCNC: 4.1 MMOL/L (ref 3.5–5.2)
PROT SERPL-MCNC: 7.3 G/DL (ref 6–8.5)
PROT UR STRIP-MCNC: NEGATIVE MG/DL
RBC # BLD AUTO: 4.57 10*6/MM3 (ref 3.77–5.28)
RBC # UR STRIP: NEGATIVE /UL
SODIUM SERPL-SCNC: 128 MMOL/L (ref 136–145)
SP GR UR: 1.01 (ref 1–1.03)
UROBILINOGEN UR QL: NORMAL
WBC # BLD AUTO: 7.21 10*3/MM3 (ref 3.4–10.8)

## 2021-06-02 PROCEDURE — 81003 URINALYSIS AUTO W/O SCOPE: CPT | Performed by: PHYSICIAN ASSISTANT

## 2021-06-02 PROCEDURE — 85025 COMPLETE CBC W/AUTO DIFF WBC: CPT | Performed by: PHYSICIAN ASSISTANT

## 2021-06-02 PROCEDURE — 99214 OFFICE O/P EST MOD 30 MIN: CPT | Performed by: PHYSICIAN ASSISTANT

## 2021-06-02 PROCEDURE — 83690 ASSAY OF LIPASE: CPT | Performed by: PHYSICIAN ASSISTANT

## 2021-06-02 PROCEDURE — 80053 COMPREHEN METABOLIC PANEL: CPT | Performed by: PHYSICIAN ASSISTANT

## 2021-06-02 PROCEDURE — 36415 COLL VENOUS BLD VENIPUNCTURE: CPT | Performed by: PHYSICIAN ASSISTANT

## 2021-06-02 RX ORDER — PROMETHAZINE HYDROCHLORIDE 12.5 MG/1
12.5 TABLET ORAL EVERY 8 HOURS PRN
Qty: 20 TABLET | Refills: 0 | Status: SHIPPED | OUTPATIENT
Start: 2021-06-02 | End: 2022-02-23

## 2021-06-02 NOTE — PROGRESS NOTES
Chief Complaint   Patient presents with   • Fatigue     5 day, abdominal, hx of h-pylori, constipation, loss of appetite       Subjective       History of Present Illness     Bharti Haines is a 71 y.o. female. She presents with 5 day history of nausea, weakness, and constipation. Pt reports she also does not have much of an appetite. She has not had a BM in 4 days. She denies any fever, chills, HA, sore throat, abdominal pain, vomiting, diarrhea, dark stools or blood in stool. She does have a hx of H Pylori x2 episodes in remote history. She has tried Miralax yesterday but still has not had BM. She is drinking plenty of water and green tea.       The following portions of the patient's history were reviewed and updated as appropriate: allergies, current medications, past medical history, past social history and problem list.    Allergies   Allergen Reactions   • Prilosec [Omeprazole] Myalgia   • Zofran [Ondansetron Hcl] Itching   • Cefdinir Rash and Other (See Comments)   • Penicillins Rash     Social History     Tobacco Use   • Smoking status: Never Smoker   • Smokeless tobacco: Never Used   Substance Use Topics   • Alcohol use: No         Current Outpatient Medications:   •  aspirin tablet, Take 81 mg by mouth daily., Disp: , Rfl:   •  diclofenac (VOLTAREN) 75 MG EC tablet, TAKE ONE TABLET BY MOUTH TWICE A DAY AS NEEDED FOR PAIN, Disp: 30 tablet, Rfl: 2  •  gabapentin (NEURONTIN) 300 MG capsule, TAKE ONE CAPSULE BY MOUTH THREE TIMES A DAY, Disp: 90 capsule, Rfl: 2  •  HYDROcodone-acetaminophen (NORCO) 7.5-325 MG per tablet, Take 1 tablet by mouth Every 8 (Eight) Hours As Needed for Moderate Pain  or Severe Pain ., Disp: 24 tablet, Rfl: 0  •  losartan-hydrochlorothiazide (HYZAAR) 50-12.5 MG per tablet, Take 1 tablet by mouth Daily., Disp: 30 tablet, Rfl: 5  •  promethazine (PHENERGAN) 12.5 MG tablet, Take 1 tablet by mouth Every 8 (Eight) Hours As Needed for Nausea or Vomiting., Disp: 20 tablet, Rfl: 0    Review  of Systems   Constitutional: Positive for appetite change. Negative for chills (decreased), fatigue and fever.   HENT: Negative for congestion, ear pain, sore throat and trouble swallowing.    Eyes: Negative for pain.   Respiratory: Negative for cough, shortness of breath and wheezing.    Cardiovascular: Negative for chest pain and palpitations.   Gastrointestinal: Positive for constipation and nausea. Negative for abdominal pain, blood in stool, diarrhea and vomiting.   Genitourinary: Negative for dysuria and hematuria.   Musculoskeletal: Negative for back pain.   Skin: Negative for rash.   Allergic/Immunologic: Negative for immunocompromised state.   Neurological: Positive for weakness. Negative for dizziness, syncope and headache.   Psychiatric/Behavioral: The patient is not nervous/anxious.        Objective   Vitals:    06/02/21 0845   BP: 110/60   Pulse: 72   Resp: 15   Temp: 97.8 °F (36.6 °C)   SpO2: 97%     Physical Exam  Constitutional:       Appearance: Normal appearance. She is well-developed.   HENT:      Head: Normocephalic and atraumatic.      Right Ear: Tympanic membrane, ear canal and external ear normal.      Left Ear: Tympanic membrane, ear canal and external ear normal.      Nose: Nose normal.      Mouth/Throat:      Mouth: Mucous membranes are moist.      Pharynx: Oropharynx is clear.   Eyes:      Conjunctiva/sclera: Conjunctivae normal.   Neck:      Thyroid: No thyromegaly.   Cardiovascular:      Rate and Rhythm: Normal rate and regular rhythm.      Heart sounds: No murmur heard.     Pulmonary:      Effort: Pulmonary effort is normal.      Breath sounds: Normal breath sounds. No wheezing or rales.   Abdominal:      Palpations: Abdomen is soft. There is no mass.      Tenderness: There is no abdominal tenderness. There is no right CVA tenderness, left CVA tenderness or rebound.   Musculoskeletal:      Cervical back: Neck supple.   Lymphadenopathy:      Cervical: No cervical adenopathy.   Skin:      Findings: No rash.   Psychiatric:         Behavior: Behavior normal.               Assessment/Plan   Diagnoses and all orders for this visit:    1. Nausea (Primary)  -     Comprehensive Metabolic Panel  -     Lipase  -     POC Urinalysis Dipstick, Automated  -     H. Pylori Antigen, Stool - Stool, Per Rectum  -     promethazine (PHENERGAN) 12.5 MG tablet; Take 1 tablet by mouth Every 8 (Eight) Hours As Needed for Nausea or Vomiting.  Dispense: 20 tablet; Refill: 0  -     CBC & Differential    2. Decreased appetite  -     Comprehensive Metabolic Panel  -     Lipase  -     H. Pylori Antigen, Stool - Stool, Per Rectum  -     CBC & Differential    3. History of Helicobacter pylori infection  -     H. Pylori Antigen, Stool - Stool, Per Rectum  -     CBC & Differential    Other orders  -     Cancel: Basic Metabolic Panel      Will obtain labs today. Advised phenergan PRN for nausea. Advised BRAT diet, advance as tolerated.   May increase Miralax to BID until she has a BM. If she does not have a BM in the next 48 hours, advised to call office for next steps.   Further plans after review of labs.              Return if symptoms worsen or fail to improve.

## 2021-06-03 ENCOUNTER — LAB (OUTPATIENT)
Dept: INTERNAL MEDICINE | Facility: CLINIC | Age: 71
End: 2021-06-03

## 2021-06-03 PROCEDURE — 87338 HPYLORI STOOL AG IA: CPT | Performed by: PHYSICIAN ASSISTANT

## 2021-06-04 ENCOUNTER — TELEPHONE (OUTPATIENT)
Dept: INTERNAL MEDICINE | Facility: CLINIC | Age: 71
End: 2021-06-04

## 2021-06-07 ENCOUNTER — TELEPHONE (OUTPATIENT)
Dept: INTERNAL MEDICINE | Facility: CLINIC | Age: 71
End: 2021-06-07

## 2021-06-07 LAB — H PYLORI AG STL QL IA: NEGATIVE

## 2021-06-07 NOTE — TELEPHONE ENCOUNTER
"PT WOULD LIKE TO KNOW THE RESULTS OF HER STOOL SAMPLE ASAP. IS NOT FEELING WELL AND DOESN'T \"WANT TO WAIT ONE MORE DAY\". PLEASE ADVISE.  "

## 2021-06-07 NOTE — TELEPHONE ENCOUNTER
The H.pylori is negative.  Please schedule with me this week.  Marshal Guerrero MD  14:20 EDT  06/07/21

## 2021-06-07 NOTE — TELEPHONE ENCOUNTER
S/W pt, informed that her H.pylori is negative but that Dr Corea wants to see her for re-evaluation since she is still having problems.  Appt scheduled for Wed., 6/9 at 12:30 with Dr Corea.  Verb apprec.

## 2021-06-09 ENCOUNTER — OFFICE VISIT (OUTPATIENT)
Dept: INTERNAL MEDICINE | Facility: CLINIC | Age: 71
End: 2021-06-09

## 2021-06-09 VITALS
RESPIRATION RATE: 19 BRPM | DIASTOLIC BLOOD PRESSURE: 82 MMHG | TEMPERATURE: 98.6 F | HEART RATE: 68 BPM | SYSTOLIC BLOOD PRESSURE: 138 MMHG

## 2021-06-09 DIAGNOSIS — R30.0 DYSURIA: ICD-10-CM

## 2021-06-09 DIAGNOSIS — R10.2 PELVIC PAIN: ICD-10-CM

## 2021-06-09 DIAGNOSIS — R11.0 NAUSEA: Primary | ICD-10-CM

## 2021-06-09 DIAGNOSIS — E87.1 HYPONATREMIA: ICD-10-CM

## 2021-06-09 LAB
BILIRUB BLD-MCNC: NEGATIVE MG/DL
CLARITY, POC: CLEAR
COLOR UR: YELLOW
EXPIRATION DATE: ABNORMAL
GLUCOSE UR STRIP-MCNC: NEGATIVE MG/DL
KETONES UR QL: ABNORMAL
LEUKOCYTE EST, POC: NEGATIVE
Lab: ABNORMAL
NITRITE UR-MCNC: NEGATIVE MG/ML
PH UR: 6.5 [PH] (ref 5–8)
PROT UR STRIP-MCNC: NEGATIVE MG/DL
RBC # UR STRIP: NEGATIVE /UL
SP GR UR: 1.01 (ref 1–1.03)
UROBILINOGEN UR QL: NORMAL

## 2021-06-09 PROCEDURE — 80053 COMPREHEN METABOLIC PANEL: CPT | Performed by: INTERNAL MEDICINE

## 2021-06-09 PROCEDURE — 99214 OFFICE O/P EST MOD 30 MIN: CPT | Performed by: INTERNAL MEDICINE

## 2021-06-09 PROCEDURE — 81003 URINALYSIS AUTO W/O SCOPE: CPT | Performed by: INTERNAL MEDICINE

## 2021-06-09 PROCEDURE — 82150 ASSAY OF AMYLASE: CPT | Performed by: INTERNAL MEDICINE

## 2021-06-09 PROCEDURE — 36415 COLL VENOUS BLD VENIPUNCTURE: CPT | Performed by: INTERNAL MEDICINE

## 2021-06-09 PROCEDURE — 83690 ASSAY OF LIPASE: CPT | Performed by: INTERNAL MEDICINE

## 2021-06-09 PROCEDURE — 87086 URINE CULTURE/COLONY COUNT: CPT | Performed by: INTERNAL MEDICINE

## 2021-06-09 PROCEDURE — 85025 COMPLETE CBC W/AUTO DIFF WBC: CPT | Performed by: INTERNAL MEDICINE

## 2021-06-09 RX ORDER — SULFAMETHOXAZOLE AND TRIMETHOPRIM 800; 160 MG/1; MG/1
1 TABLET ORAL 2 TIMES DAILY
Qty: 20 TABLET | Refills: 0 | Status: SHIPPED | OUTPATIENT
Start: 2021-06-09 | End: 2021-06-12 | Stop reason: ALTCHOICE

## 2021-06-10 LAB
ALBUMIN SERPL-MCNC: 4.4 G/DL (ref 3.5–5.2)
ALBUMIN/GLOB SERPL: 1.4 G/DL
ALP SERPL-CCNC: 81 U/L (ref 39–117)
ALT SERPL W P-5'-P-CCNC: 18 U/L (ref 1–33)
AMYLASE SERPL-CCNC: 37 U/L (ref 28–100)
ANION GAP SERPL CALCULATED.3IONS-SCNC: 12.9 MMOL/L (ref 5–15)
AST SERPL-CCNC: 19 U/L (ref 1–32)
BACTERIA SPEC AEROBE CULT: NO GROWTH
BASOPHILS # BLD AUTO: 0.02 10*3/MM3 (ref 0–0.2)
BASOPHILS NFR BLD AUTO: 0.2 % (ref 0–1.5)
BILIRUB SERPL-MCNC: 0.7 MG/DL (ref 0–1.2)
BUN SERPL-MCNC: 9 MG/DL (ref 8–23)
BUN/CREAT SERPL: 12.7 (ref 7–25)
CALCIUM SPEC-SCNC: 9.8 MG/DL (ref 8.6–10.5)
CHLORIDE SERPL-SCNC: 85 MMOL/L (ref 98–107)
CO2 SERPL-SCNC: 23.1 MMOL/L (ref 22–29)
CREAT SERPL-MCNC: 0.71 MG/DL (ref 0.57–1)
DEPRECATED RDW RBC AUTO: 39.2 FL (ref 37–54)
EOSINOPHIL # BLD AUTO: 0.03 10*3/MM3 (ref 0–0.4)
EOSINOPHIL NFR BLD AUTO: 0.3 % (ref 0.3–6.2)
ERYTHROCYTE [DISTWIDTH] IN BLOOD BY AUTOMATED COUNT: 12.4 % (ref 12.3–15.4)
GFR SERPL CREATININE-BSD FRML MDRD: 81 ML/MIN/1.73
GLOBULIN UR ELPH-MCNC: 3.1 GM/DL
GLUCOSE SERPL-MCNC: 106 MG/DL (ref 65–99)
HCT VFR BLD AUTO: 40.6 % (ref 34–46.6)
HGB BLD-MCNC: 14.9 G/DL (ref 12–15.9)
IMM GRANULOCYTES # BLD AUTO: 0.04 10*3/MM3 (ref 0–0.05)
IMM GRANULOCYTES NFR BLD AUTO: 0.5 % (ref 0–0.5)
LIPASE SERPL-CCNC: 41 U/L (ref 13–60)
LYMPHOCYTES # BLD AUTO: 1.03 10*3/MM3 (ref 0.7–3.1)
LYMPHOCYTES NFR BLD AUTO: 11.8 % (ref 19.6–45.3)
MCH RBC QN AUTO: 31.5 PG (ref 26.6–33)
MCHC RBC AUTO-ENTMCNC: 36.7 G/DL (ref 31.5–35.7)
MCV RBC AUTO: 85.8 FL (ref 79–97)
MONOCYTES # BLD AUTO: 0.8 10*3/MM3 (ref 0.1–0.9)
MONOCYTES NFR BLD AUTO: 9.2 % (ref 5–12)
NEUTROPHILS NFR BLD AUTO: 6.82 10*3/MM3 (ref 1.7–7)
NEUTROPHILS NFR BLD AUTO: 78 % (ref 42.7–76)
NRBC BLD AUTO-RTO: 0 /100 WBC (ref 0–0.2)
PLATELET # BLD AUTO: 310 10*3/MM3 (ref 140–450)
PMV BLD AUTO: 9.9 FL (ref 6–12)
POTASSIUM SERPL-SCNC: 4.2 MMOL/L (ref 3.5–5.2)
PROT SERPL-MCNC: 7.5 G/DL (ref 6–8.5)
RBC # BLD AUTO: 4.73 10*6/MM3 (ref 3.77–5.28)
SODIUM SERPL-SCNC: 121 MMOL/L (ref 136–145)
WBC # BLD AUTO: 8.74 10*3/MM3 (ref 3.4–10.8)

## 2021-06-12 ENCOUNTER — DOCUMENTATION (OUTPATIENT)
Dept: INTERNAL MEDICINE | Facility: CLINIC | Age: 71
End: 2021-06-12

## 2021-06-12 RX ORDER — NITROFURANTOIN 25; 75 MG/1; MG/1
100 CAPSULE ORAL 2 TIMES DAILY
Qty: 14 CAPSULE | Refills: 0 | Status: SHIPPED | OUTPATIENT
Start: 2021-06-12 | End: 2021-06-19

## 2021-06-14 ENCOUNTER — TELEPHONE (OUTPATIENT)
Dept: INTERNAL MEDICINE | Facility: CLINIC | Age: 71
End: 2021-06-14

## 2021-06-16 ENCOUNTER — HOSPITAL ENCOUNTER (OUTPATIENT)
Dept: ULTRASOUND IMAGING | Facility: HOSPITAL | Age: 71
End: 2021-06-16

## 2021-06-18 ENCOUNTER — TELEPHONE (OUTPATIENT)
Dept: INTERNAL MEDICINE | Facility: CLINIC | Age: 71
End: 2021-06-18

## 2021-06-18 DIAGNOSIS — E87.1 HYPONATREMIA: Primary | ICD-10-CM

## 2021-06-18 NOTE — TELEPHONE ENCOUNTER
Called patient to check on her.  She is much improved.  States that she didn't yet come in for repeat labs and I have reminded her to do this.  She will stop in either today or early next week.  Marshal Guerrero MD  12:40 EDT  06/18/21

## 2021-06-21 ENCOUNTER — LAB (OUTPATIENT)
Dept: LAB | Facility: HOSPITAL | Age: 71
End: 2021-06-21

## 2021-06-21 DIAGNOSIS — E87.1 HYPONATREMIA: ICD-10-CM

## 2021-06-21 PROCEDURE — 80048 BASIC METABOLIC PNL TOTAL CA: CPT

## 2021-06-22 LAB
ANION GAP SERPL CALCULATED.3IONS-SCNC: 13.1 MMOL/L (ref 5–15)
BUN SERPL-MCNC: 12 MG/DL (ref 8–23)
BUN/CREAT SERPL: 16.9 (ref 7–25)
CALCIUM SPEC-SCNC: 9.4 MG/DL (ref 8.6–10.5)
CHLORIDE SERPL-SCNC: 98 MMOL/L (ref 98–107)
CO2 SERPL-SCNC: 20.9 MMOL/L (ref 22–29)
CREAT SERPL-MCNC: 0.71 MG/DL (ref 0.57–1)
GFR SERPL CREATININE-BSD FRML MDRD: 81 ML/MIN/1.73
GLUCOSE SERPL-MCNC: 84 MG/DL (ref 65–99)
POTASSIUM SERPL-SCNC: 4.3 MMOL/L (ref 3.5–5.2)
SODIUM SERPL-SCNC: 132 MMOL/L (ref 136–145)

## 2021-06-29 NOTE — PROGRESS NOTES
Chief Complaint   Patient presents with   • Nausea       History of Present Illness    The patient presents for an acute visit for a two week history of symptoms of nausea, vomiting and anorexia but she has not had diarrhea. The emesis is non-bilious. Bowel Movements are normal in color and consistency. There is no blood in the bowel movement. She denies recent travel, the possibility of eating undercooked poultry, the possibility of eating undercooked hamburger, the possibility of eating spoiled seafood or recently eating at a restaurant. The symptoms are not associated with abdominal pain. The symptoms are not associated with fever. She does not have other symptoms, including a dry cough, a wet cough, wheezing, facial pain, a headache, eye drainage, ear pain, ear drainage, myalgias, a sore throat, nasal discharge, decreased appetite, chills, lightheadedness, dizziness, a dry mouth, a rash or weight loss. She does complain of dysuria for several days.  The patient presents for follow-up of hyponatremia. She reports that she is having nausea and vomiting.    Review of Systems    CONSTITUTIONAL- Denies Sweats, Fatigue, Weakness or Malaise.    PULMONARY- Denies Dyspnea, Sputum Production, Cough, Hemoptysis or Pleuritic Chest Pain.    CARDIOVASCULAR- Denies Chest Pain, Claudication, Edema, Syncope, Palpitations or Irregular Heart Beat.    Medications      Current Outpatient Medications:   •  aspirin tablet, Take 81 mg by mouth daily., Disp: , Rfl:   •  gabapentin (NEURONTIN) 300 MG capsule, TAKE ONE CAPSULE BY MOUTH THREE TIMES A DAY, Disp: 90 capsule, Rfl: 2  •  HYDROcodone-acetaminophen (NORCO) 7.5-325 MG per tablet, Take 1 tablet by mouth Every 8 (Eight) Hours As Needed for Moderate Pain  or Severe Pain ., Disp: 24 tablet, Rfl: 0  •  losartan-hydrochlorothiazide (HYZAAR) 50-12.5 MG per tablet, Take 1 tablet by mouth Daily., Disp: 30 tablet, Rfl: 5  •  promethazine (PHENERGAN) 12.5 MG tablet, Take 1 tablet by mouth  Every 8 (Eight) Hours As Needed for Nausea or Vomiting., Disp: 20 tablet, Rfl: 0     Allergies    Allergies   Allergen Reactions   • Prilosec [Omeprazole] Myalgia   • Zofran [Ondansetron Hcl] Itching   • Cefdinir Rash and Other (See Comments)   • Penicillins Rash       Problem List    Patient Active Problem List   Diagnosis   • Arthritis   • Hyperlipidemia   • Hyperglycemia   • Hypertension   • Class 2 severe obesity due to excess calories with serious comorbidity and body mass index (BMI) of 39.0 to 39.9 in adult (CMS/[facility])   • Muscular aches   • Venous insufficiency   • Osteoarthritis   • Low back pain   • Arthralgia   • Lumbar radiculopathy   • Spinal stenosis, lumbar region, with neurogenic claudication       Medications, Allergies, Problems List and Past History were reviewed and updated.    Physical Examination    /82   Pulse 68   Temp 98.6 °F (37 °C)   Resp 19   LMP  (LMP Unknown)     HEENT: Head- Normocephalic Atraumatic. Facies- Within normal limits. Pinnas- Normal texture and shape bilaterally. Canals- Normal bilaterally. TMs- Normal bilaterally. Nares- Patent bilaterally. Nasal Septum- is normal. There is no tenderness to palpation over the frontal or maxillary sinuses. Lids- Normal bilaterally. Conjunctiva- Clear bilaterally. Sclera- Anicteric bilaterally. Oropharynx- Moist with no lesions. Tonsils- No enlargement, erythema or exudate.    Neck: Thyroid- non enlarged, symmetric and has no nodules. No bruits are detected. ROM- Normal Range of Motion with no rigidity.    Lungs: Auscultation- Clear to auscultation bilaterally. There are no retractions, clubbing or cyanosis. The Expiratory to Inspiratory ratio is equal.    Cardiovascular: There are no carotid bruits. Heart- Normal Rate with Regular rhythm and no murmurs. There are no gallops. There are no rubs. In the lower extremities there is no edema. The upper extremities do not have edema.    Abdomen: Soft, benign, non-tender with no masses,  hernias, organomegaly or scars.    Impression and Assessment    Nausea.    Hyponatremia.    Plan    Nausea Plan: Further plans will be made after the tests are reviewed.    Hyponatremia Plan: Further plans will be made after test results are available.    Diagnoses and all orders for this visit:    1. Nausea (Primary)  -     CBC & Differential  -     Comprehensive Metabolic Panel  -     Amylase  -     Lipase  -     Urine Culture - Urine, Urine, Clean Catch  -     POC Urinalysis Dipstick, Automated    2. Dysuria  -     CBC & Differential  -     Comprehensive Metabolic Panel  -     Amylase  -     Lipase  -     Urine Culture - Urine, Urine, Clean Catch  -     POC Urinalysis Dipstick, Automated  -     Discontinue: sulfamethoxazole-trimethoprim (Bactrim DS) 800-160 MG per tablet; Take 1 tablet by mouth 2 (Two) Times a Day.  Dispense: 20 tablet; Refill: 0    3. Hyponatremia  -     CBC & Differential  -     Comprehensive Metabolic Panel  -     Amylase  -     Lipase  -     Urine Culture - Urine, Urine, Clean Catch  -     POC Urinalysis Dipstick, Automated    4. Pelvic pain  -     US Non-ob Transvaginal; Future        Return to Office    The patient was instructed to return for follow-up as needed. The patient was instructed to return sooner if the condition changes, worsens, or does not resolve.

## 2021-07-21 RX ORDER — DICLOFENAC SODIUM 75 MG/1
TABLET, DELAYED RELEASE ORAL
Qty: 30 TABLET | Refills: 1 | Status: SHIPPED | OUTPATIENT
Start: 2021-07-21 | End: 2021-10-18

## 2021-08-26 ENCOUNTER — TELEPHONE (OUTPATIENT)
Dept: INTERNAL MEDICINE | Facility: CLINIC | Age: 71
End: 2021-08-26

## 2021-08-26 NOTE — TELEPHONE ENCOUNTER
Caller: Bharti Haines    Relationship: Self    Best call back number: [phone]    What is the best time to reach you: ANYTIME    Who are you requesting to speak with (clinical staff, provider,  specific staff member): CLINICAL STAFF        What was the call regarding: THE PATIENT SCHEDULED A PRE OP VISIT FOR A HIP SURGERY THAT SHE IS HAVING ON 09/20/21. THE PATIENT NEEDS TO KNOW IF SHE NEEDS TO FAST FOR THAT APPOINTMENT AND IF THERE IS ANY WAY FOR HER TO AVOID GETTING H PILORY AFTER THE SURGERY     Do you require a callback: YES

## 2021-08-27 NOTE — TELEPHONE ENCOUNTER
She doesn't need to fast.   There is no risk of H.pylori infection with an orthopedic surgery.  Marshal Guerrero MD  11:49 EDT  08/27/21

## 2021-09-03 ENCOUNTER — LAB (OUTPATIENT)
Dept: LAB | Facility: HOSPITAL | Age: 71
End: 2021-09-03

## 2021-09-03 ENCOUNTER — OFFICE VISIT (OUTPATIENT)
Dept: INTERNAL MEDICINE | Facility: CLINIC | Age: 71
End: 2021-09-03

## 2021-09-03 VITALS
BODY MASS INDEX: 38.51 KG/M2 | WEIGHT: 191 LBS | DIASTOLIC BLOOD PRESSURE: 66 MMHG | TEMPERATURE: 98 F | RESPIRATION RATE: 16 BRPM | OXYGEN SATURATION: 96 % | HEIGHT: 59 IN | HEART RATE: 55 BPM | SYSTOLIC BLOOD PRESSURE: 138 MMHG

## 2021-09-03 DIAGNOSIS — E66.01 CLASS 2 SEVERE OBESITY DUE TO EXCESS CALORIES WITH SERIOUS COMORBIDITY AND BODY MASS INDEX (BMI) OF 39.0 TO 39.9 IN ADULT (HCC): ICD-10-CM

## 2021-09-03 DIAGNOSIS — T14.8XXA BRUISING: ICD-10-CM

## 2021-09-03 DIAGNOSIS — I10 ESSENTIAL HYPERTENSION: ICD-10-CM

## 2021-09-03 DIAGNOSIS — Z01.818 ENCOUNTER FOR PREOPERATIVE ASSESSMENT: Primary | ICD-10-CM

## 2021-09-03 LAB
ALBUMIN SERPL-MCNC: 4.4 G/DL (ref 3.5–5.2)
ALBUMIN/GLOB SERPL: 1.6 G/DL
ALP SERPL-CCNC: 90 U/L (ref 39–117)
ALT SERPL W P-5'-P-CCNC: 16 U/L (ref 1–33)
ANION GAP SERPL CALCULATED.3IONS-SCNC: 11 MMOL/L (ref 5–15)
APTT PPP: 34.1 SECONDS (ref 22–39)
AST SERPL-CCNC: 18 U/L (ref 1–32)
BILIRUB SERPL-MCNC: 0.8 MG/DL (ref 0–1.2)
BUN SERPL-MCNC: 15 MG/DL (ref 8–23)
BUN/CREAT SERPL: 19 (ref 7–25)
CALCIUM SPEC-SCNC: 9.7 MG/DL (ref 8.6–10.5)
CHLORIDE SERPL-SCNC: 99 MMOL/L (ref 98–107)
CO2 SERPL-SCNC: 26 MMOL/L (ref 22–29)
CREAT SERPL-MCNC: 0.79 MG/DL (ref 0.57–1)
DEPRECATED RDW RBC AUTO: 44 FL (ref 37–54)
ERYTHROCYTE [DISTWIDTH] IN BLOOD BY AUTOMATED COUNT: 13.5 % (ref 12.3–15.4)
GFR SERPL CREATININE-BSD FRML MDRD: 72 ML/MIN/1.73
GLOBULIN UR ELPH-MCNC: 2.8 GM/DL
GLUCOSE SERPL-MCNC: 103 MG/DL (ref 65–99)
HCT VFR BLD AUTO: 38.9 % (ref 34–46.6)
HGB BLD-MCNC: 13.3 G/DL (ref 12–15.9)
INR PPP: 1.03 (ref 0.85–1.16)
MCH RBC QN AUTO: 30.9 PG (ref 26.6–33)
MCHC RBC AUTO-ENTMCNC: 34.2 G/DL (ref 31.5–35.7)
MCV RBC AUTO: 90.3 FL (ref 79–97)
PLATELET # BLD AUTO: 269 10*3/MM3 (ref 140–450)
PMV BLD AUTO: 9.7 FL (ref 6–12)
POTASSIUM SERPL-SCNC: 4 MMOL/L (ref 3.5–5.2)
PROT SERPL-MCNC: 7.2 G/DL (ref 6–8.5)
PROTHROMBIN TIME: 13.2 SECONDS (ref 11.4–14.4)
RBC # BLD AUTO: 4.31 10*6/MM3 (ref 3.77–5.28)
SODIUM SERPL-SCNC: 136 MMOL/L (ref 136–145)
WBC # BLD AUTO: 6.19 10*3/MM3 (ref 3.4–10.8)

## 2021-09-03 PROCEDURE — 85027 COMPLETE CBC AUTOMATED: CPT | Performed by: NURSE PRACTITIONER

## 2021-09-03 PROCEDURE — 85730 THROMBOPLASTIN TIME PARTIAL: CPT | Performed by: NURSE PRACTITIONER

## 2021-09-03 PROCEDURE — 36415 COLL VENOUS BLD VENIPUNCTURE: CPT | Performed by: NURSE PRACTITIONER

## 2021-09-03 PROCEDURE — 99214 OFFICE O/P EST MOD 30 MIN: CPT | Performed by: NURSE PRACTITIONER

## 2021-09-03 PROCEDURE — 80053 COMPREHEN METABOLIC PANEL: CPT | Performed by: NURSE PRACTITIONER

## 2021-09-03 PROCEDURE — 85610 PROTHROMBIN TIME: CPT | Performed by: NURSE PRACTITIONER

## 2021-09-03 PROCEDURE — 93000 ELECTROCARDIOGRAM COMPLETE: CPT | Performed by: NURSE PRACTITIONER

## 2021-09-03 RX ORDER — TRAMADOL HYDROCHLORIDE 50 MG/1
TABLET ORAL
COMMUNITY
Start: 2021-08-26 | End: 2022-02-23

## 2021-09-07 ENCOUNTER — APPOINTMENT (OUTPATIENT)
Dept: MAMMOGRAPHY | Facility: HOSPITAL | Age: 71
End: 2021-09-07

## 2021-09-07 ENCOUNTER — TELEPHONE (OUTPATIENT)
Dept: INTERNAL MEDICINE | Facility: CLINIC | Age: 71
End: 2021-09-07

## 2021-09-08 ENCOUNTER — TELEPHONE (OUTPATIENT)
Dept: INTERNAL MEDICINE | Facility: CLINIC | Age: 71
End: 2021-09-08

## 2021-09-08 NOTE — TELEPHONE ENCOUNTER
VANNESSA CALLED TO ASK IF YOU SENT THE PRE OP INFORMATION TO HER SURGEON?  VANNESSA RECEIVED A PACKET IN THE MAIL FROM THE SURGEON TO FILL OUT.  PLEASE CALL HER [phone].

## 2021-09-08 NOTE — TELEPHONE ENCOUNTER
Can you fax the letter associated with the visit , visit notes, labs results from 9/7/21, and EKG to [facility]. Fax [phone]. If any difficulty phone [phone] extension [phone] (Viky).

## 2021-09-13 ENCOUNTER — TELEPHONE (OUTPATIENT)
Dept: INTERNAL MEDICINE | Facility: CLINIC | Age: 71
End: 2021-09-13

## 2021-09-13 NOTE — TELEPHONE ENCOUNTER
VM to call office       Message from Jailyn Underwood: Please call and let her know I have completed her work-up for surgery clearance; all labs were normal. I will draft a letter if she needs one.

## 2021-10-18 RX ORDER — DICLOFENAC SODIUM 75 MG/1
TABLET, DELAYED RELEASE ORAL
Qty: 30 TABLET | Refills: 1 | Status: SHIPPED | OUTPATIENT
Start: 2021-10-18 | End: 2021-10-19

## 2021-10-19 RX ORDER — DICLOFENAC SODIUM 75 MG/1
TABLET, DELAYED RELEASE ORAL
Qty: 30 TABLET | Refills: 1 | Status: SHIPPED | OUTPATIENT
Start: 2021-10-19 | End: 2022-07-11 | Stop reason: SDUPTHER

## 2021-12-06 ENCOUNTER — HOSPITAL ENCOUNTER (OUTPATIENT)
Dept: MAMMOGRAPHY | Facility: HOSPITAL | Age: 71
Discharge: HOME | End: 2021-12-06
Admitting: INTERNAL MEDICINE

## 2021-12-06 DIAGNOSIS — Z12.31 VISIT FOR SCREENING MAMMOGRAM: ICD-10-CM

## 2021-12-06 PROCEDURE — 77067 SCR MAMMO BI INCL CAD: CPT | Performed by: RADIOLOGY

## 2021-12-06 PROCEDURE — 77063 BREAST TOMOSYNTHESIS BI: CPT | Performed by: RADIOLOGY

## 2021-12-06 PROCEDURE — 77067 SCR MAMMO BI INCL CAD: CPT

## 2021-12-06 PROCEDURE — 77063 BREAST TOMOSYNTHESIS BI: CPT

## 2021-12-09 DIAGNOSIS — M54.41 CHRONIC MIDLINE LOW BACK PAIN WITH BILATERAL SCIATICA: ICD-10-CM

## 2021-12-09 DIAGNOSIS — M54.42 CHRONIC MIDLINE LOW BACK PAIN WITH BILATERAL SCIATICA: ICD-10-CM

## 2021-12-09 DIAGNOSIS — G89.29 CHRONIC MIDLINE LOW BACK PAIN WITH BILATERAL SCIATICA: ICD-10-CM

## 2021-12-09 RX ORDER — GABAPENTIN 300 MG/1
CAPSULE ORAL
Qty: 90 CAPSULE | Refills: 2 | Status: SHIPPED | OUTPATIENT
Start: 2021-12-09 | End: 2022-02-23

## 2021-12-13 ENCOUNTER — TELEPHONE (OUTPATIENT)
Dept: INTERNAL MEDICINE | Facility: CLINIC | Age: 71
End: 2021-12-13

## 2021-12-13 NOTE — TELEPHONE ENCOUNTER
Patient was returning a call she received from yeyo and would like to get a call back at [phone].    Please advise

## 2021-12-13 NOTE — TELEPHONE ENCOUNTER
Caller: Bharti Haines    Relationship: Self    Best call back number:[phone]     What is the best time to reach you: ANYTIME     Who are you requesting to speak with (clinical staff, provider,  specific staff member): CLINICAL STAFF     Do you know the name of the person who called: EPIFANIO     What was the call regarding: POSSIBLY MAMMOGRAM RESULTS    Do you require a callback: YES

## 2022-01-18 ENCOUNTER — HOSPITAL ENCOUNTER (OUTPATIENT)
Dept: MAMMOGRAPHY | Facility: HOSPITAL | Age: 72
Discharge: HOME | End: 2022-01-18
Admitting: RADIOLOGY

## 2022-01-18 DIAGNOSIS — R92.8 ABNORMAL MAMMOGRAM: ICD-10-CM

## 2022-01-18 PROCEDURE — 77065 DX MAMMO INCL CAD UNI: CPT | Performed by: RADIOLOGY

## 2022-01-18 PROCEDURE — G0279 TOMOSYNTHESIS, MAMMO: HCPCS

## 2022-01-18 PROCEDURE — 77065 DX MAMMO INCL CAD UNI: CPT

## 2022-01-18 PROCEDURE — G0279 TOMOSYNTHESIS, MAMMO: HCPCS | Performed by: RADIOLOGY

## 2022-02-23 ENCOUNTER — OFFICE VISIT (OUTPATIENT)
Dept: INTERNAL MEDICINE | Facility: CLINIC | Age: 72
End: 2022-02-23

## 2022-02-23 ENCOUNTER — LAB (OUTPATIENT)
Dept: LAB | Facility: HOSPITAL | Age: 72
End: 2022-02-23

## 2022-02-23 VITALS
RESPIRATION RATE: 20 BRPM | BODY MASS INDEX: 40.78 KG/M2 | SYSTOLIC BLOOD PRESSURE: 132 MMHG | HEART RATE: 60 BPM | WEIGHT: 189 LBS | TEMPERATURE: 98.2 F | HEIGHT: 57 IN | DIASTOLIC BLOOD PRESSURE: 78 MMHG

## 2022-02-23 DIAGNOSIS — E78.5 HYPERLIPIDEMIA, UNSPECIFIED HYPERLIPIDEMIA TYPE: ICD-10-CM

## 2022-02-23 DIAGNOSIS — I10 ESSENTIAL HYPERTENSION: ICD-10-CM

## 2022-02-23 DIAGNOSIS — Z00.00 MEDICARE ANNUAL WELLNESS VISIT, SUBSEQUENT: Primary | ICD-10-CM

## 2022-02-23 DIAGNOSIS — R73.02 IMPAIRED GLUCOSE TOLERANCE: ICD-10-CM

## 2022-02-23 DIAGNOSIS — Z20.822 CLOSE EXPOSURE TO COVID-19 VIRUS: ICD-10-CM

## 2022-02-23 LAB
ALBUMIN SERPL-MCNC: 4.4 G/DL (ref 3.5–5.2)
ALBUMIN/GLOB SERPL: 1.5 G/DL
ALP SERPL-CCNC: 88 U/L (ref 39–117)
ALT SERPL W P-5'-P-CCNC: 14 U/L (ref 1–33)
ANION GAP SERPL CALCULATED.3IONS-SCNC: 14.6 MMOL/L (ref 5–15)
AST SERPL-CCNC: 20 U/L (ref 1–32)
BASOPHILS # BLD AUTO: 0.04 10*3/MM3 (ref 0–0.2)
BASOPHILS NFR BLD AUTO: 0.5 % (ref 0–1.5)
BILIRUB BLD-MCNC: NEGATIVE MG/DL
BILIRUB SERPL-MCNC: 0.7 MG/DL (ref 0–1.2)
BUN SERPL-MCNC: 12 MG/DL (ref 8–23)
BUN/CREAT SERPL: 13.5 (ref 7–25)
CALCIUM SPEC-SCNC: 9.6 MG/DL (ref 8.6–10.5)
CHLORIDE SERPL-SCNC: 97 MMOL/L (ref 98–107)
CHOLEST SERPL-MCNC: 235 MG/DL (ref 0–200)
CLARITY, POC: CLEAR
CO2 SERPL-SCNC: 24.4 MMOL/L (ref 22–29)
COLOR UR: YELLOW
CREAT SERPL-MCNC: 0.89 MG/DL (ref 0.57–1)
DEPRECATED RDW RBC AUTO: 44.5 FL (ref 37–54)
EOSINOPHIL # BLD AUTO: 0.06 10*3/MM3 (ref 0–0.4)
EOSINOPHIL NFR BLD AUTO: 0.8 % (ref 0.3–6.2)
ERYTHROCYTE [DISTWIDTH] IN BLOOD BY AUTOMATED COUNT: 14 % (ref 12.3–15.4)
EXPIRATION DATE: ABNORMAL
GFR SERPL CREATININE-BSD FRML MDRD: 62 ML/MIN/1.73
GLOBULIN UR ELPH-MCNC: 2.9 GM/DL
GLUCOSE SERPL-MCNC: 93 MG/DL (ref 65–99)
GLUCOSE UR STRIP-MCNC: NEGATIVE MG/DL
HBA1C MFR BLD: 5.6 % (ref 4.8–5.6)
HCT VFR BLD AUTO: 41.5 % (ref 34–46.6)
HDLC SERPL-MCNC: 56 MG/DL (ref 40–60)
HGB BLD-MCNC: 14 G/DL (ref 12–15.9)
IMM GRANULOCYTES # BLD AUTO: 0.04 10*3/MM3 (ref 0–0.05)
IMM GRANULOCYTES NFR BLD AUTO: 0.5 % (ref 0–0.5)
KETONES UR QL: NEGATIVE
LDLC SERPL CALC-MCNC: 151 MG/DL (ref 0–100)
LDLC/HDLC SERPL: 2.64 {RATIO}
LEUKOCYTE EST, POC: ABNORMAL
LYMPHOCYTES # BLD AUTO: 1.91 10*3/MM3 (ref 0.7–3.1)
LYMPHOCYTES NFR BLD AUTO: 25.7 % (ref 19.6–45.3)
Lab: ABNORMAL
MCH RBC QN AUTO: 29.2 PG (ref 26.6–33)
MCHC RBC AUTO-ENTMCNC: 33.7 G/DL (ref 31.5–35.7)
MCV RBC AUTO: 86.5 FL (ref 79–97)
MONOCYTES # BLD AUTO: 0.65 10*3/MM3 (ref 0.1–0.9)
MONOCYTES NFR BLD AUTO: 8.8 % (ref 5–12)
NEUTROPHILS NFR BLD AUTO: 4.72 10*3/MM3 (ref 1.7–7)
NEUTROPHILS NFR BLD AUTO: 63.7 % (ref 42.7–76)
NITRITE UR-MCNC: NEGATIVE MG/ML
NRBC BLD AUTO-RTO: 0 /100 WBC (ref 0–0.2)
PH UR: 6.5 [PH] (ref 5–8)
PLATELET # BLD AUTO: 285 10*3/MM3 (ref 140–450)
PMV BLD AUTO: 10.4 FL (ref 6–12)
POTASSIUM SERPL-SCNC: 3.9 MMOL/L (ref 3.5–5.2)
PROT SERPL-MCNC: 7.3 G/DL (ref 6–8.5)
PROT UR STRIP-MCNC: NEGATIVE MG/DL
RBC # BLD AUTO: 4.8 10*6/MM3 (ref 3.77–5.28)
RBC # UR STRIP: NEGATIVE /UL
SODIUM SERPL-SCNC: 136 MMOL/L (ref 136–145)
SP GR UR: 1 (ref 1–1.03)
TRIGL SERPL-MCNC: 156 MG/DL (ref 0–150)
UROBILINOGEN UR QL: NORMAL
VLDLC SERPL-MCNC: 28 MG/DL (ref 5–40)
WBC NRBC COR # BLD: 7.42 10*3/MM3 (ref 3.4–10.8)

## 2022-02-23 PROCEDURE — 36415 COLL VENOUS BLD VENIPUNCTURE: CPT | Performed by: INTERNAL MEDICINE

## 2022-02-23 PROCEDURE — G0439 PPPS, SUBSEQ VISIT: HCPCS | Performed by: INTERNAL MEDICINE

## 2022-02-23 PROCEDURE — 1160F RVW MEDS BY RX/DR IN RCRD: CPT | Performed by: INTERNAL MEDICINE

## 2022-02-23 PROCEDURE — 81003 URINALYSIS AUTO W/O SCOPE: CPT | Performed by: INTERNAL MEDICINE

## 2022-02-23 PROCEDURE — 86769 SARS-COV-2 COVID-19 ANTIBODY: CPT | Performed by: INTERNAL MEDICINE

## 2022-02-23 PROCEDURE — 85025 COMPLETE CBC W/AUTO DIFF WBC: CPT | Performed by: INTERNAL MEDICINE

## 2022-02-23 PROCEDURE — 80061 LIPID PANEL: CPT | Performed by: INTERNAL MEDICINE

## 2022-02-23 PROCEDURE — 1170F FXNL STATUS ASSESSED: CPT | Performed by: INTERNAL MEDICINE

## 2022-02-23 PROCEDURE — 83036 HEMOGLOBIN GLYCOSYLATED A1C: CPT | Performed by: INTERNAL MEDICINE

## 2022-02-23 PROCEDURE — 1126F AMNT PAIN NOTED NONE PRSNT: CPT | Performed by: INTERNAL MEDICINE

## 2022-02-23 PROCEDURE — 80053 COMPREHEN METABOLIC PANEL: CPT | Performed by: INTERNAL MEDICINE

## 2022-02-23 NOTE — PROGRESS NOTES
Chief Complaint   Patient presents with   • Medicare Wellness-subsequent       History of Present Illness      The patient presents for a follow-up related to hypertension. The patient reports that she has had no headaches, chest pain, dyspnea, edema, syncope, blurred vision or palpitations. She states that she is taking her medication as prescribed. She is not having medication side effects.    The patient presents for a follow-up related to hyperlipidemia. She is following a low fat diet. She reports that she is exercising. She is not taking medication for hyperlipidemia. She denies orthopnea, paroxysmal nocturnal dyspnea or dyspnea on exertion.    The patient presents for a follow-up related to impaired glucose tolerance. She does not check her blood sugars at home. She denies hypoglycemic symptoms. The patient denies polyuria, polydypsia or polyphagia. She reports no symptoms of neuropathy. The patient is not on medication for her impaired glucose tolerance. The patient does check her feet daily at home.    Review of Systems    CONSTITUTIONAL- Denies Unexplained Weight Loss, Fever, Chills, Sweats, Fatigue, Weakness or Malaise.    PULMONARY- Denies Wheezing, Sputum Production, Cough, Hemoptysis or Pleuritic Chest Pain.    CARDIOVASCULAR- Denies Claudication or Irregular Heart Beat.    Medications      Current Outpatient Medications:   •  aspirin tablet, Take 81 mg by mouth daily., Disp: , Rfl:   •  diclofenac (VOLTAREN) 75 MG EC tablet, TAKE ONE TABLET BY MOUTH TWICE A DAY AS NEEDED FOR PAIN, Disp: 30 tablet, Rfl: 1  •  losartan-hydrochlorothiazide (HYZAAR) 50-12.5 MG per tablet, Take 1 tablet by mouth Daily., Disp: 30 tablet, Rfl: 5     Allergies    Allergies   Allergen Reactions   • Prilosec [Omeprazole] Myalgia   • Zofran [Ondansetron Hcl] Itching   • Cefdinir Rash and Other (See Comments)   • Penicillins Rash       Problem List    Patient Active Problem List   Diagnosis   • Arthritis   • Hyperlipidemia   •  "Hyperglycemia   • Hypertension   • Class 2 severe obesity due to excess calories with serious comorbidity and body mass index (BMI) of 39.0 to 39.9 in adult (HCC)   • Muscular aches   • Venous insufficiency   • Osteoarthritis   • Low back pain   • Arthralgia   • Lumbar radiculopathy   • Spinal stenosis, lumbar region, with neurogenic claudication   • Encounter for pre-operative examination       Medications, Allergies, Problems List and Past History were reviewed and updated.    Physical Examination    /78   Pulse 60   Temp 98.2 °F (36.8 °C) (Infrared)   Resp 20   Ht 143.5 cm (56.5\")   Wt 85.7 kg (189 lb)   LMP  (LMP Unknown)   Breastfeeding No   BMI 41.63 kg/m²     HEENT: Head- Normocephalic Atraumatic. Facies- Within normal limits. Pinnas- Normal texture and shape bilaterally. Canals- Normal bilaterally. TMs- Normal bilaterally. Nares- Patent bilaterally. Nasal Septum- is normal. There is no tenderness to palpation over the frontal or maxillary sinuses. Lids- Normal bilaterally. Conjunctiva- Clear bilaterally. Sclera- Anicteric bilaterally. Oropharynx- Moist with no lesions. Tonsils- No enlargement, erythema or exudate.    Neck: Thyroid- non enlarged, symmetric and has no nodules. No bruits are detected.    Lungs: Auscultation- Clear to auscultation bilaterally. There are no retractions, clubbing or cyanosis. The Expiratory to Inspiratory ratio is equal.    Lymph Nodes: Cervical- no enlarged lymph nodes noted.    Cardiovascular: There are no carotid bruits. Heart- Normal Rate with Regular rhythm and no murmurs. There are no gallops. There are no rubs. In the lower extremities there is no edema. The upper extremities do not have edema.    Abdomen: Soft, benign, non-tender with no masses, hernias, organomegaly or scars.    Impression and Assessment    Essential Hypertension.    Hyperlipidemia.    Impaired Glucose Tolerance.    Plan    Essential Hypertension Plan: The patient was instructed to continue " the current medications.    Hyperlipidemia Plan: She was instructed to eat a low fat diet. She was encouraged to exercise daily. Weight loss was encouraged.    Impaired Glucose Tolerance Plan: Further plans will be formulated after test results are reviewed.    Diagnoses and all orders for this visit:    1. Medicare annual wellness visit, subsequent (Primary)    2. Essential hypertension  -     CBC & Differential; Future  -     POC Urinalysis Dipstick, Automated; Future  -     Comprehensive Metabolic Panel; Future    3. Hyperlipidemia, unspecified hyperlipidemia type  -     Lipid Panel; Future  -     Comprehensive Metabolic Panel; Future    4. Impaired glucose tolerance  -     Hemoglobin A1c; Future    5. Close exposure to COVID-19 virus  -     SARS-CoV-2 Antibodies (Roche); Future    Other orders  -     Cancel: Pneumococcal Polysaccharide Vaccine 23-Valent Greater Than or Equal To 3yo Subcutaneous / IM        Return to Office    The patient was instructed to return for follow-up in 4 months. The patient was instructed to return sooner if the condition changes, worsens, or does not resolve.

## 2022-02-23 NOTE — PROGRESS NOTES
The ABCs of the Annual Wellness Visit  Subsequent Medicare Wellness Visit    Chief Complaint   Patient presents with   • Medicare Wellness-subsequent      Subjective    History of Present Illness:  Bharti Haines is a 72 y.o. female who presents for a Subsequent Medicare Wellness Visit.    The following portions of the patient's history were reviewed and   updated as appropriate: allergies, current medications, past family history, past medical history, past social history, past surgical history and problem list.    Compared to one year ago, the patient feels her physical   health is the same.    Compared to one year ago, the patient feels her mental   health is the same.    Recent Hospitalizations:  She was not admitted to the hospital during the last year.       Current Medical Providers:  Patient Care Team:  Marshal Guerrero MD as PCP - General (Internal Medicine)  Chucho Pollard MD as Consulting Physician (Anesthesiology)    Outpatient Medications Prior to Visit   Medication Sig Dispense Refill   • aspirin tablet Take 81 mg by mouth daily.     • diclofenac (VOLTAREN) 75 MG EC tablet TAKE ONE TABLET BY MOUTH TWICE A DAY AS NEEDED FOR PAIN 30 tablet 1   • losartan-hydrochlorothiazide (HYZAAR) 50-12.5 MG per tablet Take 1 tablet by mouth Daily. 30 tablet 5   • gabapentin (NEURONTIN) 300 MG capsule TAKE ONE CAPSULE BY MOUTH THREE TIMES A DAY 90 capsule 2   • promethazine (PHENERGAN) 12.5 MG tablet Take 1 tablet by mouth Every 8 (Eight) Hours As Needed for Nausea or Vomiting. 20 tablet 0   • traMADol (ULTRAM) 50 MG tablet        No facility-administered medications prior to visit.       No opioid medication identified on active medication list. I have reviewed chart for other potential  high risk medication/s and harmful drug interactions in the elderly.          Aspirin is on active medication list. Aspirin use is indicated based on review of current medical condition/s. Pros and cons of this therapy have  "been discussed today. Benefits of this medication outweigh potential harm.  Patient has been encouraged to continue taking this medication.  .      Patient Active Problem List   Diagnosis   • Arthritis   • Hyperlipidemia   • Hyperglycemia   • Hypertension   • Class 2 severe obesity due to excess calories with serious comorbidity and body mass index (BMI) of 39.0 to 39.9 in adult (HCC)   • Muscular aches   • Venous insufficiency   • Osteoarthritis   • Low back pain   • Arthralgia   • Lumbar radiculopathy   • Spinal stenosis, lumbar region, with neurogenic claudication   • Encounter for pre-operative examination     Advance Care Planning  Advance Directive is not on file.  ACP discussion was held with the patient during this visit. Patient does not have an advance directive, information provided.          Objective    Vitals:    02/23/22 1039   BP: 146/74   BP Location: Left arm   Patient Position: Sitting   Cuff Size: Adult   Pulse: 60   Resp: 20   Temp: 98.2 °F (36.8 °C)   TempSrc: Infrared   Weight: 85.7 kg (189 lb)   Height: 143.5 cm (56.5\")   PainSc: 0-No pain     BMI Readings from Last 1 Encounters:   02/23/22 41.63 kg/m²   BMI is above normal parameters. Recommendations include: exercise counseling and nutrition counseling    Does the patient have evidence of cognitive impairment? No    Physical Exam       Finger Rub Hearing{Test (right ear):passed  Finger Rub Hearing{Test (left ear):passed          HEALTH RISK ASSESSMENT    Smoking Status:  Social History     Tobacco Use   Smoking Status Never Smoker   Smokeless Tobacco Never Used     Alcohol Consumption:  Social History     Substance and Sexual Activity   Alcohol Use No     Fall Risk Screen:    STEADI Fall Risk Assessment was completed, and patient is at MODERATE risk for falls. Assessment completed on:2/23/2022    Depression Screening:  PHQ-2/PHQ-9 Depression Screening 2/23/2022   Little interest or pleasure in doing things 0   Feeling down, depressed, or " hopeless 0   Total Score 0       Health Habits and Functional and Cognitive Screening:  Functional & Cognitive Status 2/23/2022   Do you have difficulty preparing food and eating? No   Do you have difficulty bathing yourself, getting dressed or grooming yourself? No   Do you have difficulty using the toilet? No   Do you have difficulty moving around from place to place? No   Do you have trouble with steps or getting out of a bed or a chair? Yes   Current Diet Frequent Junk Food   Dental Exam Not up to date   Eye Exam Up to date   Exercise (times per week) 0 times per week   Current Exercises Include No Regular Exercise   Current Exercise Activities Include -   Do you need help using the phone?  No   Are you deaf or do you have serious difficulty hearing?  Yes   Do you need help with transportation? No   Do you need help shopping? No   Do you need help preparing meals?  No   Do you need help with housework?  No   Do you need help with laundry? No   Do you need help taking your medications? No   Do you need help managing money? No   Do you ever drive or ride in a car without wearing a seat belt? No   Have you felt unusual stress, anger or loneliness in the last month? No   Who do you live with? Spouse   If you need help, do you have trouble finding someone available to you? No   Have you been bothered in the last four weeks by sexual problems? No   Do you have difficulty concentrating, remembering or making decisions? No       Age-appropriate Screening Schedule:  Refer to the list below for future screening recommendations based on patient's age, sex and/or medical conditions. Orders for these recommended tests are listed in the plan section. The patient has been provided with a written plan.    Health Maintenance   Topic Date Due   • DXA SCAN  Never done   • ZOSTER VACCINE (2 of 3) 07/18/2014   • LIPID PANEL  02/10/2022   • MAMMOGRAM  01/18/2024   • TDAP/TD VACCINES (2 - Td or Tdap) 05/23/2024   • INFLUENZA VACCINE   Completed              Assessment/Plan   CMS Preventative Services Quick Reference  Risk Factors Identified During Encounter  Immunizations Discussed/Encouraged (specific Immunizations; Pneumococcal 23  Obesity/Overweight   The above risks/problems have been discussed with the patient.  Follow up actions/plans if indicated are seen below in the Assessment/Plan Section.  Pertinent information has been shared with the patient in the After Visit Summary.    Diagnoses and all orders for this visit:    1. Medicare annual wellness visit, subsequent (Primary)    2. Essential hypertension  -     CBC & Differential; Future  -     POC Urinalysis Dipstick, Automated; Future  -     Comprehensive Metabolic Panel; Future    3. Hyperlipidemia, unspecified hyperlipidemia type  -     Lipid Panel; Future  -     Comprehensive Metabolic Panel; Future    4. Impaired glucose tolerance  -     Hemoglobin A1c; Future    5. Close exposure to COVID-19 virus  -     SARS-CoV-2 Antibodies (Roche); Future    Other orders  -     Cancel: Pneumococcal Polysaccharide Vaccine 23-Valent Greater Than or Equal To 3yo Subcutaneous / IM            Patient's Body mass index is 41.63 kg/m². indicating that she is obese (BMI >30). Obesity-related health conditions include the following: hypertension. Obesity is unchanged. BMI is is above average; BMI management plan is completed. We discussed portion control and increasing exercise..

## 2022-02-23 NOTE — PATIENT INSTRUCTIONS
Advance Care Planning and Advance Directives     You make decisions on a daily basis - decisions about where you want to live, your career, your home, your life. Perhaps one of the most important decisions you face is your choice for future medical care. Take time to talk with your family and your healthcare team and start planning today.  Advance Care Planning is a process that can help you:  · Understand possible future healthcare decisions in light of your own experiences  · Reflect on those decision in light of your goals and values  · Discuss your decisions with those closest to you and the healthcare professionals that care for you  · Make a plan by creating a document that reflects your wishes    Surrogate Decision Maker  In the event of a medical emergency, which has left you unable to communicate or to make your own decisions, you would need someone to make decisions for you.  It is important to discuss your preferences for medical treatment with this person while you are in good health.     Qualities of a surrogate decision maker:  • Willing to take on this role and responsibility  • Knows what you want for future medical care  • Willing to follow your wishes even if they don't agree with them  • Able to make difficult medical decisions under stressful circumstances    Advance Directives  These are legal documents you can create that will guide your healthcare team and decision maker(s) when needed. These documents can be stored in the electronic medical record.    · Living Will - a legal document to guide your care if you have a terminal condition or a serious illness and are unable to communicate. States vary by statute in document names/types, but most forms may include one or more of the following:        -  Directions regarding life-prolonging treatments        -  Directions regarding artificially provided nutrition/hydration        -  Choosing a healthcare decision maker        -  Direction  regarding organ/tissue donation    · Durable Power of  for Healthcare - this document names an -in-fact to make medical decisions for you, but it may also allow this person to make personal and financial decisions for you. Please seek the advice of an  if you need this type of document.    **Advance Directives are not required and no one may discriminate against you if you do not sign one.    Medical Orders  Many states allow specific forms/orders signed by your physician to record your wishes for medical treatment in your current state of health. This form, signed in personal communication with your physician, addresses resuscitation and other medical interventions that you may or may not want.      For more information or to schedule a time with a [facility] Advance Care Planning Facilitator contact: [employer] or call [phone] and someone will contact you directly.    Medicare Wellness  Personal Prevention Plan of Service     Date of Office Visit:  2022  Encounter Provider:  Marshal Guerrero MD  Place of Service:  [facility]  Patient Name: Bharti Haines  :  1950    As part of the Medicare Wellness portion of your visit today, we are providing you with this personalized preventive plan of services (PPPS). This plan is based upon recommendations of the [place] Preventive Services Task Force (USPSTF) and the Advisory Committee on Immunization Practices (ACIP).    This lists the preventive care services that should be considered, and provides dates of when you are due. Items listed as completed are up-to-date and do not require any further intervention.    Health Maintenance   Topic Date Due   • DXA SCAN  Never done   • ZOSTER VACCINE (2 of 3) 2014   • Pneumococcal Vaccine 65+ (2 of 2 - PPSV23) 2019   • COVID-19 Vaccine (2 - Moderna 3-dose series) 2021   • LIPID PANEL  02/10/2022   •  ANNUAL WELLNESS VISIT  02/23/2023   • MAMMOGRAM  01/18/2024   • TDAP/TD VACCINES (2 - Td or Tdap) 05/23/2024   • COLORECTAL CANCER SCREENING  01/21/2032   • HEPATITIS C SCREENING  Completed   • INFLUENZA VACCINE  Completed       Orders Placed This Encounter   Procedures   • Pneumococcal Polysaccharide Vaccine 23-Valent Greater Than or Equal To 3yo Subcutaneous / IM   • SARS-CoV-2 Antibodies (Roche)     Standing Status:   Future     Standing Expiration Date:   2/24/2023     Order Specific Question:   Release to patient     Answer:   Immediate   • Hemoglobin A1c     Standing Status:   Future     Standing Expiration Date:   2/24/2023     Order Specific Question:   Release to patient     Answer:   Immediate   • Lipid Panel     Standing Status:   Future     Standing Expiration Date:   2/24/2023   • Comprehensive Metabolic Panel     Standing Status:   Future     Standing Expiration Date:   2/24/2023     Order Specific Question:   Release to patient     Answer:   Immediate   • POC Urinalysis Dipstick, Automated     Standing Status:   Future     Order Specific Question:   Release to patient     Answer:   Immediate   • CBC & Differential     Standing Status:   Future     Standing Expiration Date:   2/24/2023     Order Specific Question:   Manual Differential     Answer:   No       Return in about 4 months (around 6/23/2022) for Follow-up.          Heart-Healthy Eating Plan  Heart-healthy meal planning includes:  · Eating less unhealthy fats.  · Eating more healthy fats.  · Making other changes in your diet.  Talk with your doctor or a diet specialist (dietitian) to create an eating plan that is right for you.  What is my plan?  Your doctor may recommend an eating plan that includes:  · Total fat: ______% or less of total calories a day.  · Saturated fat: ______% or less of total calories a day.  · Cholesterol: less than _________mg a day.  What are tips for following this plan?  Cooking  Avoid frying your food. Try to bake,  boil, grill, or broil it instead. You can also reduce fat by:  · Removing the skin from poultry.  · Removing all visible fats from meats.  · Steaming vegetables in water or broth.  Meal planning    · At meals, divide your plate into four equal parts:  ? Fill one-half of your plate with vegetables and green salads.  ? Fill one-fourth of your plate with whole grains.  ? Fill one-fourth of your plate with lean protein foods.  · Eat 4-5 servings of vegetables per day. A serving of vegetables is:  ? 1 cup of raw or cooked vegetables.  ? 2 cups of raw leafy greens.  · Eat 4-5 servings of fruit per day. A serving of fruit is:  ? 1 medium whole fruit.  ? ¼ cup of dried fruit.  ? ½ cup of fresh, frozen, or canned fruit.  ? ½ cup of 100% fruit juice.  · Eat more foods that have soluble fiber. These are apples, broccoli, carrots, beans, peas, and barley. Try to get 20-30 g of fiber per day.  · Eat 4-5 servings of nuts, legumes, and seeds per week:  ? 1 serving of dried beans or legumes equals ½ cup after being cooked.  ? 1 serving of nuts is ¼ cup.  ? 1 serving of seeds equals 1 tablespoon.    General information  · Eat more home-cooked food. Eat less restaurant, buffet, and fast food.  · Limit or avoid alcohol.  · Limit foods that are high in starch and sugar.  · Avoid fried foods.  · Lose weight if you are overweight.  · Keep track of how much salt (sodium) you eat. This is important if you have high blood pressure. Ask your doctor to tell you more about this.  · Try to add vegetarian meals each week.  Fats  · Choose healthy fats. These include olive oil and canola oil, flaxseeds, walnuts, almonds, and seeds.  · Eat more omega-3 fats. These include salmon, mackerel, sardines, tuna, flaxseed oil, and ground flaxseeds. Try to eat fish at least 2 times each week.  · Check food labels. Avoid foods with trans fats or high amounts of saturated fat.  · Limit saturated fats.  ? These are often found in animal products, such as  meats, butter, and cream.  ? These are also found in plant foods, such as palm oil, palm kernel oil, and coconut oil.  · Avoid foods with partially hydrogenated oils in them. These have trans fats. Examples are stick margarine, some tub margarines, cookies, crackers, and other baked goods.  What foods can I eat?  Fruits  All fresh, canned (in natural juice), or frozen fruits.  Vegetables  Fresh or frozen vegetables (raw, steamed, roasted, or grilled). Green salads.  Grains  Most grains. Choose whole wheat and whole grains most of the time. Rice and pasta, including brown rice and pastas made with whole wheat.  Meats and other proteins  Lean, well-trimmed beef, veal, pork, and lamb. Chicken and turkey without skin. All fish and shellfish. Wild duck, rabbit, pheasant, and venison. Egg whites or low-cholesterol egg substitutes. Dried beans, peas, lentils, and tofu. Seeds and most nuts.  Dairy  Low-fat or nonfat cheeses, including ricotta and mozzarella. Skim or 1% milk that is liquid, powdered, or evaporated. Buttermilk that is made with low-fat milk. Nonfat or low-fat yogurt.  Fats and oils  Non-hydrogenated (trans-free) margarines. Vegetable oils, including soybean, sesame, sunflower, olive, peanut, safflower, corn, canola, and cottonseed. Salad dressings or mayonnaise made with a vegetable oil.  Beverages  Mineral water. Coffee and tea. Diet carbonated beverages.  Sweets and desserts  Sherbet, gelatin, and fruit ice. Small amounts of dark chocolate.  Limit all sweets and desserts.  Seasonings and condiments  All seasonings and condiments.  The items listed above may not be a complete list of foods and drinks you can eat. Contact a dietitian for more options.  What foods should I avoid?  Fruits  Canned fruit in heavy syrup. Fruit in cream or butter sauce. Fried fruit. Limit coconut.  Vegetables  Vegetables cooked in cheese, cream, or butter sauce. Fried vegetables.  Grains  Breads that are made with saturated or  trans fats, oils, or whole milk. Croissants. Sweet rolls. Donuts. High-fat crackers, such as cheese crackers.  Meats and other proteins  Fatty meats, such as hot dogs, ribs, sausage, alejandra, rib-eye roast or steak. High-fat deli meats, such as salami and bologna. Caviar. Domestic duck and goose. Organ meats, such as liver.  Dairy  Cream, sour cream, cream cheese, and creamed cottage cheese. Whole-milk cheeses. Whole or 2% milk that is liquid, evaporated, or condensed. Whole buttermilk. Cream sauce or high-fat cheese sauce. Yogurt that is made from whole milk.  Fats and oils  Meat fat, or shortening. Cocoa butter, hydrogenated oils, palm oil, coconut oil, palm kernel oil. Solid fats and shortenings, including alejandra fat, salt pork, lard, and butter. Nondairy cream substitutes. Salad dressings with cheese or sour cream.  Beverages  Regular sodas and juice drinks with added sugar.  Sweets and desserts  Frosting. Pudding. Cookies. Cakes. Pies. Milk chocolate or white chocolate. Buttered syrups. Full-fat ice cream or ice cream drinks.  The items listed above may not be a complete list of foods and drinks to avoid. Contact a dietitian for more information.  Summary  · Heart-healthy meal planning includes eating less unhealthy fats, eating more healthy fats, and making other changes in your diet.  · Eat a balanced diet. This includes fruits and vegetables, low-fat or nonfat dairy, lean protein, nuts and legumes, whole grains, and heart-healthy oils and fats.  This information is not intended to replace advice given to you by your health care provider. Make sure you discuss any questions you have with your health care provider.  Document Revised: 02/21/2019 Document Reviewed: 01/25/2019  Elsevier Patient Education © 2021 [employer]

## 2022-02-25 LAB — SARS-COV-2 AB SERPL QL IA: NEGATIVE

## 2022-03-02 ENCOUNTER — TELEPHONE (OUTPATIENT)
Dept: INTERNAL MEDICINE | Facility: CLINIC | Age: 72
End: 2022-03-02

## 2022-03-02 NOTE — TELEPHONE ENCOUNTER
Caller: Vannessa Haines    Relationship: Self    Best call back number:[phone]    Caller requesting test results: VANNESSA    What test was performed:LAB TESTS    When was the test performed: 02/23/22    Where was the test performed: OFFICE     Additional notes: WOULD LIKE BY 11:30 AM TODAY TO  WHEN HER  COME IN FOR A SCAN.    SHE WOULD ALSO LIKE TO HAVE A COVID ANTI BODY TEST DONE.

## 2022-06-09 ENCOUNTER — LAB (OUTPATIENT)
Dept: LAB | Facility: HOSPITAL | Age: 72
End: 2022-06-09

## 2022-06-09 ENCOUNTER — OFFICE VISIT (OUTPATIENT)
Dept: INTERNAL MEDICINE | Facility: CLINIC | Age: 72
End: 2022-06-09

## 2022-06-09 ENCOUNTER — TELEPHONE (OUTPATIENT)
Dept: INTERNAL MEDICINE | Facility: CLINIC | Age: 72
End: 2022-06-09

## 2022-06-09 VITALS
TEMPERATURE: 97.1 F | HEART RATE: 70 BPM | DIASTOLIC BLOOD PRESSURE: 78 MMHG | WEIGHT: 194.2 LBS | SYSTOLIC BLOOD PRESSURE: 150 MMHG | OXYGEN SATURATION: 97 % | RESPIRATION RATE: 18 BRPM | BODY MASS INDEX: 42.77 KG/M2

## 2022-06-09 DIAGNOSIS — R82.998 LEUKOCYTES IN URINE: ICD-10-CM

## 2022-06-09 DIAGNOSIS — R20.0 NUMBNESS IN BOTH LEGS: Primary | ICD-10-CM

## 2022-06-09 DIAGNOSIS — R29.898 WEAKNESS OF BOTH LOWER EXTREMITIES: ICD-10-CM

## 2022-06-09 DIAGNOSIS — R39.11 URINARY HESITANCY: ICD-10-CM

## 2022-06-09 DIAGNOSIS — Z23 IMMUNIZATION DUE: ICD-10-CM

## 2022-06-09 DIAGNOSIS — Z01.84 COVID-19 VIRUS IGM ANTIBODY TEST RESULT UNKNOWN: ICD-10-CM

## 2022-06-09 LAB
ALBUMIN SERPL-MCNC: 4.8 G/DL (ref 3.5–5.2)
ALBUMIN/GLOB SERPL: 2 G/DL
ALP SERPL-CCNC: 76 U/L (ref 39–117)
ALT SERPL W P-5'-P-CCNC: 13 U/L (ref 1–33)
ANION GAP SERPL CALCULATED.3IONS-SCNC: 12.5 MMOL/L (ref 5–15)
AST SERPL-CCNC: 21 U/L (ref 1–32)
BASOPHILS # BLD AUTO: 0.02 10*3/MM3 (ref 0–0.2)
BASOPHILS NFR BLD AUTO: 0.3 % (ref 0–1.5)
BILIRUB BLD-MCNC: NEGATIVE MG/DL
BILIRUB SERPL-MCNC: 0.6 MG/DL (ref 0–1.2)
BUN SERPL-MCNC: 12 MG/DL (ref 8–23)
BUN/CREAT SERPL: 15 (ref 7–25)
CALCIUM SPEC-SCNC: 9.6 MG/DL (ref 8.6–10.5)
CHLORIDE SERPL-SCNC: 100 MMOL/L (ref 98–107)
CLARITY, POC: ABNORMAL
CO2 SERPL-SCNC: 24.5 MMOL/L (ref 22–29)
COLOR UR: YELLOW
CREAT SERPL-MCNC: 0.8 MG/DL (ref 0.57–1)
DEPRECATED RDW RBC AUTO: 40.5 FL (ref 37–54)
EGFRCR SERPLBLD CKD-EPI 2021: 77.9 ML/MIN/1.73
EOSINOPHIL # BLD AUTO: 0.04 10*3/MM3 (ref 0–0.4)
EOSINOPHIL NFR BLD AUTO: 0.6 % (ref 0.3–6.2)
ERYTHROCYTE [DISTWIDTH] IN BLOOD BY AUTOMATED COUNT: 13.2 % (ref 12.3–15.4)
EXPIRATION DATE: ABNORMAL
GLOBULIN UR ELPH-MCNC: 2.4 GM/DL
GLUCOSE SERPL-MCNC: 100 MG/DL (ref 65–99)
GLUCOSE UR STRIP-MCNC: NEGATIVE MG/DL
HCT VFR BLD AUTO: 38.6 % (ref 34–46.6)
HGB BLD-MCNC: 13.5 G/DL (ref 12–15.9)
IMM GRANULOCYTES # BLD AUTO: 0.03 10*3/MM3 (ref 0–0.05)
IMM GRANULOCYTES NFR BLD AUTO: 0.4 % (ref 0–0.5)
KETONES UR QL: NEGATIVE
LEUKOCYTE EST, POC: ABNORMAL
LYMPHOCYTES # BLD AUTO: 1.45 10*3/MM3 (ref 0.7–3.1)
LYMPHOCYTES NFR BLD AUTO: 20.3 % (ref 19.6–45.3)
Lab: ABNORMAL
MCH RBC QN AUTO: 30.1 PG (ref 26.6–33)
MCHC RBC AUTO-ENTMCNC: 35 G/DL (ref 31.5–35.7)
MCV RBC AUTO: 86.2 FL (ref 79–97)
MONOCYTES # BLD AUTO: 0.61 10*3/MM3 (ref 0.1–0.9)
MONOCYTES NFR BLD AUTO: 8.5 % (ref 5–12)
NEUTROPHILS NFR BLD AUTO: 5.01 10*3/MM3 (ref 1.7–7)
NEUTROPHILS NFR BLD AUTO: 69.9 % (ref 42.7–76)
NITRITE UR-MCNC: NEGATIVE MG/ML
NRBC BLD AUTO-RTO: 0 /100 WBC (ref 0–0.2)
PH UR: 8 [PH] (ref 5–8)
PLATELET # BLD AUTO: 265 10*3/MM3 (ref 140–450)
PMV BLD AUTO: 10 FL (ref 6–12)
POTASSIUM SERPL-SCNC: 4.1 MMOL/L (ref 3.5–5.2)
PROT SERPL-MCNC: 7.2 G/DL (ref 6–8.5)
PROT UR STRIP-MCNC: NEGATIVE MG/DL
RBC # BLD AUTO: 4.48 10*6/MM3 (ref 3.77–5.28)
RBC # UR STRIP: NEGATIVE /UL
SODIUM SERPL-SCNC: 137 MMOL/L (ref 136–145)
SP GR UR: 1 (ref 1–1.03)
TSH SERPL DL<=0.05 MIU/L-ACNC: 1.24 UIU/ML (ref 0.27–4.2)
UROBILINOGEN UR QL: NORMAL
WBC NRBC COR # BLD: 7.16 10*3/MM3 (ref 3.4–10.8)

## 2022-06-09 PROCEDURE — 36415 COLL VENOUS BLD VENIPUNCTURE: CPT | Performed by: NURSE PRACTITIONER

## 2022-06-09 PROCEDURE — 86769 SARS-COV-2 COVID-19 ANTIBODY: CPT | Performed by: NURSE PRACTITIONER

## 2022-06-09 PROCEDURE — 81003 URINALYSIS AUTO W/O SCOPE: CPT | Performed by: NURSE PRACTITIONER

## 2022-06-09 PROCEDURE — 84443 ASSAY THYROID STIM HORMONE: CPT | Performed by: NURSE PRACTITIONER

## 2022-06-09 PROCEDURE — 80053 COMPREHEN METABOLIC PANEL: CPT | Performed by: NURSE PRACTITIONER

## 2022-06-09 PROCEDURE — 85025 COMPLETE CBC W/AUTO DIFF WBC: CPT | Performed by: NURSE PRACTITIONER

## 2022-06-09 PROCEDURE — 99214 OFFICE O/P EST MOD 30 MIN: CPT | Performed by: NURSE PRACTITIONER

## 2022-06-09 PROCEDURE — 87086 URINE CULTURE/COLONY COUNT: CPT | Performed by: NURSE PRACTITIONER

## 2022-06-09 RX ORDER — TRIAMTERENE AND HYDROCHLOROTHIAZIDE 37.5; 25 MG/1; MG/1
1 CAPSULE ORAL EVERY MORNING
Qty: 30 CAPSULE | Refills: 1 | Status: SHIPPED | OUTPATIENT
Start: 2022-06-09 | End: 2022-08-04

## 2022-06-09 NOTE — TELEPHONE ENCOUNTER
Patient is calling to alert provider and office that she was exposed to Covid positive person on Sunday 6/5/2022, she didn't know about exposure till after appointment at our office. Patient reports no symptoms but would like to know how long to quarantine.

## 2022-06-09 NOTE — PROGRESS NOTES
Chief Complaint  Numbness (Feet and lower legs, tingling and burning)    Subjective          Bharti Haines presents to [facility]  History of Present Illness     The patient presents today with concerns for burning in her feet. She is accompanied by her . She normally sees Dr. Guerrero.    The patient has a history of low back pain, arthritis in her back, sciatica, and spinal stenosis in the spinal cord in the low back region. She has not had any treatment for her spinal stenosis, noting it is not bothersome to her. She is unsure what triggers her sciatica. She has had pain management shots in the past.     The patient began having numbness and tingling in her feet 4 months ago. She states her legs are getting weaker. She had discomfort in her calves bilaterally, comparing it to restless leg syndrome or fibromyalgia, prior to her current symptoms. The patient denies any problem with falling. Her last MRI was in 02/2021. She was taking gabapentin 3 times a week in the past and had sweats, chills, and mood swings when she discontinued the medication. She does not want to take any addictive medications that could cause withdrawals again.    The patient denies a history of blood clot in her right leg.    The patient has been having pressure and urinary hesitancy since she was switched from clonidine to losartan 50 mg, which she has been on for 5 years since she has been seeing Dr. Guerrero. She states that her bowels are working well. She fears her current urinary symptoms, weakness, and bilateral foot paresthesia are side effects of the losartan and no longer wants to take the medication and has not taken it since 06/09/2022. She requests a new medication. She denies any blood pressure medications that caused her symptoms in the past. She denies any edema.    Her blood pressure this morning was 136/72 mmHg. She had a systolic of 127 mmHg as well, noting her  blood pressure was fluctuating. She has had a pulse in the morning 57 bpm and one time it was 48 bpm.     The patient began to have back problems in 2015 after tripping over a hose. She had back surgery in 2018 but does not remember the doctor.    The patient has worried about the veins in her neck since her friend suffered from a stroke.     The patient believes she had COVID-19 in 11/2021 although she had a negative test. She had a negative antibody test in 02/2022. She would like to have another antibody test. She denies any recent exposure.     The patient states she gets a dry cough and is not sure if it is from her last COVID-19 booster.    She is curious about fibromyalgia testing.     Objective   Vital Signs:   /78 (BP Location: Right arm, Patient Position: Sitting, Cuff Size: Adult)   Pulse 70   Temp 97.1 °F (36.2 °C) (Infrared)   Resp 18   Wt 88.1 kg (194 lb 3.2 oz)   SpO2 97%   BMI 42.77 kg/m²     Physical Exam  Vitals and nursing note reviewed.   Constitutional:       Appearance: She is well-developed.   HENT:      Head: Normocephalic and atraumatic.   Neck:      Thyroid: No thyromegaly.   Cardiovascular:      Rate and Rhythm: Normal rate and regular rhythm.   Pulmonary:      Effort: Pulmonary effort is normal.      Breath sounds: Normal breath sounds.   Abdominal:      General: Bowel sounds are normal. There is no distension.      Palpations: Abdomen is soft.      Tenderness: There is no abdominal tenderness.   Lymphadenopathy:      Cervical: No cervical adenopathy.   Skin:     Capillary Refill: Capillary refill takes 2 to 3 seconds.   Neurological:      Mental Status: She is alert and oriented to person, place, and time.   Psychiatric:         Mood and Affect: Mood normal.         Behavior: Behavior normal.        Result Review :                 Assessment and Plan    Diagnoses and all orders for this visit:    1. Numbness in both legs (Primary)  -     TSH; Future  -     CBC & Differential;  Future  -     Comprehensive Metabolic Panel; Future    2. Weakness of both lower extremities  -     TSH; Future  -     CBC & Differential; Future  -     Comprehensive Metabolic Panel; Future    3. Urinary hesitancy  -     POC Urinalysis Dipstick, Automated; Future    4. COVID-19 virus IgM antibody test result unknown  -     Covid-19 Antibodies IgM; Future    Other orders  -     triamterene-hydrochlorothiazide (Dyazide) 37.5-25 MG per capsule; Take 1 capsule by mouth Every Morning.  Dispense: 30 capsule; Refill: 1      Numbness and tingling in feet.  B12, TSH, folic acid, chemistry, regular hemoglobin, and A1c ordered.  order an MRI lumbarIf lab test are normal.    Hypertension.  - Discontinue losartan.  - Start Dyazide 1 pill daily.  - Monitor blood pressure in 2 to 4 days.  - Follow up in 1 week.    Urinary changes  - UA ordered.     COVID-19 antibody test   - COVID-19 antibody test ordered.  Patient calls with addendum that she was exposed to COVID testing for her.        Follow Up   prnPatient was given instructions and counseling regarding her condition or for health maintenance advice. Please see specific information pulled into the AVS if appropriate.     RTC/call  If symptoms worsen  Meds MOA and SE's reviewed and pt v/u      Transcribed from ambient dictation for MIKE Hanna by Tracy Judd.  06/09/22   14:46 EDT    Patient verbalized consent to the visit recording.

## 2022-06-10 ENCOUNTER — TELEPHONE (OUTPATIENT)
Dept: INTERNAL MEDICINE | Facility: CLINIC | Age: 72
End: 2022-06-10

## 2022-06-10 LAB — BACTERIA SPEC AEROBE CULT: NORMAL

## 2022-06-10 NOTE — TELEPHONE ENCOUNTER
Gave pt results. Verbalized good understanding.       ----- Message from MIKE Caban sent at 6/10/2022  1:38 PM EDT -----  Let the patient know her urine culture was negative.

## 2022-06-13 ENCOUNTER — TELEPHONE (OUTPATIENT)
Dept: INTERNAL MEDICINE | Facility: CLINIC | Age: 72
End: 2022-06-13

## 2022-06-13 LAB
LABORATORY COMMENT REPORT: NORMAL
Lab: NORMAL
PATHOLOGIST NAME: NORMAL
SARS-COV-2 IGM SERPL IA-ACNC: 0.02 COI
SARS-COV-2 IGM SERPL QL IA: NEGATIVE

## 2022-06-13 NOTE — TELEPHONE ENCOUNTER
"S/W pt, informed that Dr Corea said \"They aren't back.  I'll let her know when they are.\"  Verb understanding and agreement.   "

## 2022-06-13 NOTE — TELEPHONE ENCOUNTER
.    Caller: Bharti Haines    Relationship: Self    Best call back number: [phone]        What test was performed: COVID-19 ANTIBODY TEST     When was the test performed:06/08/2022    Where was the test performed: IN OFFICE     Additional notes: THE PATIENT WOULD LIKE TO KNOW IF HER TEST RESULTS ARE IN

## 2022-07-11 ENCOUNTER — OFFICE VISIT (OUTPATIENT)
Dept: INTERNAL MEDICINE | Facility: CLINIC | Age: 72
End: 2022-07-11

## 2022-07-11 VITALS
BODY MASS INDEX: 43.17 KG/M2 | RESPIRATION RATE: 20 BRPM | HEART RATE: 72 BPM | WEIGHT: 196 LBS | TEMPERATURE: 98.4 F | DIASTOLIC BLOOD PRESSURE: 74 MMHG | SYSTOLIC BLOOD PRESSURE: 138 MMHG

## 2022-07-11 DIAGNOSIS — E78.5 HYPERLIPIDEMIA, UNSPECIFIED HYPERLIPIDEMIA TYPE: ICD-10-CM

## 2022-07-11 DIAGNOSIS — Z23 IMMUNIZATION DUE: ICD-10-CM

## 2022-07-11 DIAGNOSIS — R73.02 IMPAIRED GLUCOSE TOLERANCE: ICD-10-CM

## 2022-07-11 DIAGNOSIS — R20.2 PARESTHESIA: ICD-10-CM

## 2022-07-11 DIAGNOSIS — I10 ESSENTIAL HYPERTENSION: Primary | ICD-10-CM

## 2022-07-11 LAB
BASOPHILS # BLD AUTO: 0.04 10*3/MM3 (ref 0–0.2)
BASOPHILS NFR BLD AUTO: 0.5 % (ref 0–1.5)
DEPRECATED RDW RBC AUTO: 42.2 FL (ref 37–54)
EOSINOPHIL # BLD AUTO: 0.08 10*3/MM3 (ref 0–0.4)
EOSINOPHIL NFR BLD AUTO: 1.1 % (ref 0.3–6.2)
ERYTHROCYTE [DISTWIDTH] IN BLOOD BY AUTOMATED COUNT: 13.4 % (ref 12.3–15.4)
HBA1C MFR BLD: 5.6 % (ref 4.8–5.6)
HCT VFR BLD AUTO: 39.5 % (ref 34–46.6)
HGB BLD-MCNC: 13.5 G/DL (ref 12–15.9)
IMM GRANULOCYTES # BLD AUTO: 0.04 10*3/MM3 (ref 0–0.05)
IMM GRANULOCYTES NFR BLD AUTO: 0.5 % (ref 0–0.5)
LYMPHOCYTES # BLD AUTO: 1.55 10*3/MM3 (ref 0.7–3.1)
LYMPHOCYTES NFR BLD AUTO: 21.1 % (ref 19.6–45.3)
MCH RBC QN AUTO: 29.9 PG (ref 26.6–33)
MCHC RBC AUTO-ENTMCNC: 34.2 G/DL (ref 31.5–35.7)
MCV RBC AUTO: 87.4 FL (ref 79–97)
MONOCYTES # BLD AUTO: 0.67 10*3/MM3 (ref 0.1–0.9)
MONOCYTES NFR BLD AUTO: 9.1 % (ref 5–12)
NEUTROPHILS NFR BLD AUTO: 4.98 10*3/MM3 (ref 1.7–7)
NEUTROPHILS NFR BLD AUTO: 67.7 % (ref 42.7–76)
NRBC BLD AUTO-RTO: 0 /100 WBC (ref 0–0.2)
PLATELET # BLD AUTO: 275 10*3/MM3 (ref 140–450)
PMV BLD AUTO: 10.1 FL (ref 6–12)
RBC # BLD AUTO: 4.52 10*6/MM3 (ref 3.77–5.28)
WBC NRBC COR # BLD: 7.36 10*3/MM3 (ref 3.4–10.8)

## 2022-07-11 PROCEDURE — 83036 HEMOGLOBIN GLYCOSYLATED A1C: CPT | Performed by: INTERNAL MEDICINE

## 2022-07-11 PROCEDURE — 36415 COLL VENOUS BLD VENIPUNCTURE: CPT | Performed by: INTERNAL MEDICINE

## 2022-07-11 PROCEDURE — 85025 COMPLETE CBC W/AUTO DIFF WBC: CPT | Performed by: INTERNAL MEDICINE

## 2022-07-11 PROCEDURE — 80061 LIPID PANEL: CPT | Performed by: INTERNAL MEDICINE

## 2022-07-11 PROCEDURE — 82607 VITAMIN B-12: CPT | Performed by: INTERNAL MEDICINE

## 2022-07-11 PROCEDURE — G0009 ADMIN PNEUMOCOCCAL VACCINE: HCPCS | Performed by: INTERNAL MEDICINE

## 2022-07-11 PROCEDURE — 80053 COMPREHEN METABOLIC PANEL: CPT | Performed by: INTERNAL MEDICINE

## 2022-07-11 PROCEDURE — 90677 PCV20 VACCINE IM: CPT | Performed by: INTERNAL MEDICINE

## 2022-07-11 PROCEDURE — 99214 OFFICE O/P EST MOD 30 MIN: CPT | Performed by: INTERNAL MEDICINE

## 2022-07-11 RX ORDER — DICLOFENAC SODIUM 75 MG/1
75 TABLET, DELAYED RELEASE ORAL 2 TIMES DAILY PRN
Qty: 60 TABLET | Refills: 1 | Status: SHIPPED | OUTPATIENT
Start: 2022-07-11 | End: 2023-03-27

## 2022-07-11 NOTE — PROGRESS NOTES
Chief Complaint   Patient presents with   • Follow-up     4 month follow up chronic medical problems       History of Present Illness    The patient presents for a follow-up related to paresthesias manifested by numbness, tingling and pain of the right leg and left leg. The symptoms have been present for 6 months.       The patient does not have a dry cough, a wet cough, wheezing, fever, facial pain, a headache, eye drainage, ear pain, ear drainage, nausea, vomiting, diarrhea, myalgias, sore throat, abdominal pain, nasal discharge, weight loss, decreased appetite, chills, rash, joint pain, a breast lump or GI bleeding.       The paresthesias have worsened. The patient has not been on a medication for the paresthesias.  The patient presents for a follow-up related to hypertension. The patient reports that she has had no chest pain, dyspnea, edema, syncope, blurred vision or palpitations. She states that she is taking her medication as prescribed. She is tolerating the dyazide well. She is not having medication side effects.    The patient presents for a follow-up related to hyperlipidemia. She is following a low fat diet. She reports that she is exercising. She is not taking medication for hyperlipidemia. She denies orthopnea, paroxysmal nocturnal dyspnea or dyspnea on exertion.    The patient presents for a follow-up related to impaired glucose tolerance. She does not check her blood sugars at home. She denies hypoglycemic symptoms. The patient denies polyuria, polydypsia or polyphagia. She reports no symptoms of neuropathy. The patient is not on medication for her impaired glucose tolerance. The patient does check her feet daily at home.    Review of Systems    CONSTITUTIONAL- Denies Sweats, Fatigue, Weakness or Malaise.    HENT- Denies Sinus Pain, Nasal Congestion, Decreased Hearing or Tinnitus.    GASTROINTESTINAL- Denies Blood per Rectum, Constipation or Heartburn.    PULMONARY- Denies Sputum Production, Cough,  Hemoptysis or Pleuritic Chest Pain.    CARDIOVASCULAR- Denies Claudication or Irregular Heart Beat.    Medications      Current Outpatient Medications:   •  aspirin tablet, Take 81 mg by mouth daily., Disp: , Rfl:   •  diclofenac (VOLTAREN) 75 MG EC tablet, Take 1 tablet by mouth 2 (Two) Times a Day As Needed (prn)., Disp: 60 tablet, Rfl: 1  •  triamterene-hydrochlorothiazide (Dyazide) 37.5-25 MG per capsule, Take 1 capsule by mouth Every Morning., Disp: 30 capsule, Rfl: 1     Allergies    Allergies   Allergen Reactions   • Prilosec [Omeprazole] Myalgia   • Zofran [Ondansetron Hcl] Itching   • Cefdinir Rash and Other (See Comments)   • Penicillins Rash       Problem List    Patient Active Problem List   Diagnosis   • Arthritis   • Hyperlipidemia   • Hyperglycemia   • Hypertension   • Class 2 severe obesity due to excess calories with serious comorbidity and body mass index (BMI) of 39.0 to 39.9 in adult (HCC)   • Muscular aches   • Venous insufficiency   • Osteoarthritis   • Low back pain   • Arthralgia   • Lumbar radiculopathy   • Spinal stenosis, lumbar region, with neurogenic claudication   • Encounter for pre-operative examination       Medications, Allergies, Problems List and Past History were reviewed and updated.    Physical Examination    /74 (BP Location: Right arm, Patient Position: Sitting, Cuff Size: Adult)   Pulse 72   Temp 98.4 °F (36.9 °C) (Infrared)   Resp 20   Wt 88.9 kg (196 lb)   LMP  (LMP Unknown)   Breastfeeding No   BMI 43.17 kg/m²     HEENT: Head- Normocephalic Atraumatic. Facies- Within normal limits. Pinnas- Normal texture and shape bilaterally. Canals- Normal bilaterally. TMs- Normal bilaterally. Nares- Patent bilaterally. Nasal Septum- is normal. There is no tenderness to palpation over the frontal or maxillary sinuses. Lids- Normal bilaterally. Conjunctiva- Clear bilaterally. Sclera- Anicteric bilaterally. Oropharynx- Moist with no lesions. Tonsils- No enlargement, erythema  or exudate.    Neck: Thyroid- non enlarged, symmetric and has no nodules. No bruits are detected. ROM- Normal Range of Motion with no rigidity.    Lungs: Auscultation- Clear to auscultation bilaterally. There are no retractions, clubbing or cyanosis. The Expiratory to Inspiratory ratio is equal.    Lymph Nodes: Cervical- no enlarged lymph nodes noted. Clavicular- Deferred. Axillary- Deferred. Inguinal- Deferred.    Cardiovascular: There are no carotid bruits. Heart- Normal Rate with Regular rhythm and no murmurs. There are no gallops. There are no rubs. In the lower extremities there is no edema. The upper extremities do not have edema.    Abdomen: Soft, benign, non-tender with no masses, hernias, organomegaly or scars.    Neurologic Exam:    Lower Extremity Neuro Exam: Muscle Strength is 5/5 in all major lower extremity muscle groups. Deep Tendon Reflexes are 2+ and equal in the patellar and Achilles distributions bilaterally. Sensation is normal in all distributions.    Gait: Normal.    Impression and Assessment    Essential Hypertension.    Hyperlipidemia.    Impaired Glucose Tolerance.    Paresthesias.    Plan    Essential Hypertension Plan: The patient was instructed to continue the current medications.    Hyperlipidemia Plan: The patient was instructed to exercise daily and eat a low fat diet.    Impaired Glucose Tolerance Plan: Further plans will be formulated after test results are reviewed.    Paresthesias Plan: Further plans will be made after test results are received and reviewed.    Diagnoses and all orders for this visit:    1. Essential hypertension (Primary)  -     CBC & Differential; Future  -     Comprehensive Metabolic Panel; Future  -     Comprehensive Metabolic Panel  -     CBC & Differential    2. Hyperlipidemia, unspecified hyperlipidemia type  -     Comprehensive Metabolic Panel; Future  -     Lipid Panel; Future  -     Lipid Panel  -     Comprehensive Metabolic Panel    3. Impaired glucose  tolerance  -     Comprehensive Metabolic Panel; Future  -     Hemoglobin A1c; Future  -     Hemoglobin A1c  -     Comprehensive Metabolic Panel    4. Paresthesia  -     CBC & Differential; Future  -     Vitamin B12; Future  -     MRI Lumbar Spine With & Without Contrast; Future  -     Vitamin B12  -     CBC & Differential    5. Immunization due  -     Pneumococcal Conjugate Vaccine 20-[facility] All    Other orders  -     diclofenac (VOLTAREN) 75 MG EC tablet; Take 1 tablet by mouth 2 (Two) Times a Day As Needed (prn).  Dispense: 60 tablet; Refill: 1         Return to Office    The patient was instructed to return for follow-up in 1 month. The patient was instructed to return sooner if the condition changes, worsens, or does not resolve.

## 2022-07-12 LAB
ALBUMIN SERPL-MCNC: 4.5 G/DL (ref 3.5–5.2)
ALBUMIN/GLOB SERPL: 1.7 G/DL
ALP SERPL-CCNC: 78 U/L (ref 39–117)
ALT SERPL W P-5'-P-CCNC: 16 U/L (ref 1–33)
ANION GAP SERPL CALCULATED.3IONS-SCNC: 11.8 MMOL/L (ref 5–15)
AST SERPL-CCNC: 23 U/L (ref 1–32)
BILIRUB SERPL-MCNC: 0.5 MG/DL (ref 0–1.2)
BUN SERPL-MCNC: 13 MG/DL (ref 8–23)
BUN/CREAT SERPL: 14.8 (ref 7–25)
CALCIUM SPEC-SCNC: 9.6 MG/DL (ref 8.6–10.5)
CHLORIDE SERPL-SCNC: 100 MMOL/L (ref 98–107)
CHOLEST SERPL-MCNC: 233 MG/DL (ref 0–200)
CO2 SERPL-SCNC: 24.2 MMOL/L (ref 22–29)
CREAT SERPL-MCNC: 0.88 MG/DL (ref 0.57–1)
EGFRCR SERPLBLD CKD-EPI 2021: 69.5 ML/MIN/1.73
GLOBULIN UR ELPH-MCNC: 2.6 GM/DL
GLUCOSE SERPL-MCNC: 112 MG/DL (ref 65–99)
HDLC SERPL-MCNC: 53 MG/DL (ref 40–60)
LDLC SERPL CALC-MCNC: 152 MG/DL (ref 0–100)
LDLC/HDLC SERPL: 2.8 {RATIO}
POTASSIUM SERPL-SCNC: 4.4 MMOL/L (ref 3.5–5.2)
PROT SERPL-MCNC: 7.1 G/DL (ref 6–8.5)
SODIUM SERPL-SCNC: 136 MMOL/L (ref 136–145)
TRIGL SERPL-MCNC: 158 MG/DL (ref 0–150)
VIT B12 BLD-MCNC: 412 PG/ML (ref 211–946)
VLDLC SERPL-MCNC: 28 MG/DL (ref 5–40)

## 2022-08-04 ENCOUNTER — OFFICE VISIT (OUTPATIENT)
Dept: INTERNAL MEDICINE | Facility: CLINIC | Age: 72
End: 2022-08-04

## 2022-08-04 VITALS
OXYGEN SATURATION: 97 % | DIASTOLIC BLOOD PRESSURE: 88 MMHG | WEIGHT: 196.4 LBS | BODY MASS INDEX: 42.37 KG/M2 | RESPIRATION RATE: 18 BRPM | TEMPERATURE: 97.1 F | HEIGHT: 57 IN | HEART RATE: 59 BPM | SYSTOLIC BLOOD PRESSURE: 156 MMHG

## 2022-08-04 DIAGNOSIS — R73.9 HYPERGLYCEMIA: ICD-10-CM

## 2022-08-04 DIAGNOSIS — I10 PRIMARY HYPERTENSION: Primary | ICD-10-CM

## 2022-08-04 DIAGNOSIS — R60.0 LOWER EXTREMITY EDEMA: ICD-10-CM

## 2022-08-04 LAB
EXPIRATION DATE: NORMAL
HBA1C MFR BLD: 5.6 %
Lab: NORMAL

## 2022-08-04 PROCEDURE — 3044F HG A1C LEVEL LT 7.0%: CPT | Performed by: NURSE PRACTITIONER

## 2022-08-04 PROCEDURE — 99213 OFFICE O/P EST LOW 20 MIN: CPT | Performed by: NURSE PRACTITIONER

## 2022-08-04 PROCEDURE — 83036 HEMOGLOBIN GLYCOSYLATED A1C: CPT | Performed by: NURSE PRACTITIONER

## 2022-08-04 RX ORDER — LOSARTAN POTASSIUM 50 MG/1
50 TABLET ORAL DAILY
COMMUNITY
End: 2022-08-04

## 2022-08-04 RX ORDER — HYDROCHLOROTHIAZIDE 12.5 MG/1
TABLET ORAL
Qty: 30 TABLET | Refills: 0 | Status: SHIPPED | OUTPATIENT
Start: 2022-08-04 | End: 2022-09-20

## 2022-08-04 RX ORDER — GABAPENTIN 300 MG/1
300 CAPSULE ORAL AS NEEDED
COMMUNITY
End: 2022-08-22

## 2022-08-04 RX ORDER — LOSARTAN POTASSIUM AND HYDROCHLOROTHIAZIDE 12.5; 5 MG/1; MG/1
1 TABLET ORAL DAILY
Qty: 90 TABLET | Refills: 0 | Status: SHIPPED | OUTPATIENT
Start: 2022-08-04

## 2022-08-04 NOTE — PROGRESS NOTES
"Patient Name: Bharti Haines  : 1950   MRN: [id number]     Chief Complaint:    Chief Complaint   Patient presents with   • Numbness     In feet and legs       History of Present Illness: Bharti Haines is a 72 y.o. female presents to clinic for numbness in feet progressing  to calves. Her lower legs are feeling weaker; described as tightness, numbness and burning. She notices it more at night. Walking helps it.  She has lower extremity swelling. She takes gabapentin rarely approximately twice a week.  It does not seem to help. A MRI of her back has been ordered.     Hypertension  Patient states she has been taking 1/2 tablet of \"'s losartan-hydrochlorothiazide 100/25 mg\".  Most of the time her blood pressure is in the 130s over 70s at home.  The pill she has been taking has not improved her swelling.    Her blood sugar was high at last lab draw.  She was not fasting.    Subjective     Review of System: Review of Systems   Constitutional: Negative for fatigue and fever.   HENT: Negative for congestion and rhinorrhea.    Respiratory: Negative for cough, shortness of breath and wheezing.    Cardiovascular: Positive for leg swelling. Negative for chest pain and palpitations.   Gastrointestinal: Negative for abdominal pain, diarrhea, nausea and vomiting.   Genitourinary: Negative for dysuria.   Musculoskeletal: Negative for myalgias.        Leg pain   Skin: Negative for rash.   Neurological: Positive for numbness. Negative for headaches.   Hematological: Negative for adenopathy.   Psychiatric/Behavioral: Negative for dysphoric mood.        Medications:     Current Outpatient Medications:   •  aspirin tablet, Take 81 mg by mouth daily., Disp: , Rfl:   •  diclofenac (VOLTAREN) 75 MG EC tablet, Take 1 tablet by mouth 2 (Two) Times a Day As Needed (prn)., Disp: 60 tablet, Rfl: 1  •  gabapentin (NEURONTIN) 300 MG capsule, Take 300 mg by mouth As Needed., Disp: , Rfl:   •  hydroCHLOROthiazide (HYDRODIURIL) " "12.5 MG tablet, Take 1 tablet by mouth daily as needed for edema, Disp: 30 tablet, Rfl: 0  •  losartan-hydrochlorothiazide (Hyzaar) 50-12.5 MG per tablet, Take 1 tablet by mouth Daily., Disp: 90 tablet, Rfl: 0    Allergies:   Allergies   Allergen Reactions   • Prilosec [Omeprazole] Myalgia   • Zofran [Ondansetron Hcl] Itching   • Cefdinir Rash and Other (See Comments)   • Penicillins Rash       Objective     Physical Exam:   Vital Signs:   Vitals:    08/04/22 1030   BP: 156/88   BP Location: Right arm   Patient Position: Sitting   Cuff Size: Adult   Pulse: 59   Resp: 18   Temp: 97.1 °F (36.2 °C)   TempSrc: Infrared   SpO2: 97%   Weight: 89.1 kg (196 lb 6.4 oz)   Height: 143.5 cm (56.5\")   PainSc:   8     Body mass index is 43.26 kg/m². Class 3 Severe Obesity (BMI >=40). Obesity-related health conditions include the following: hypertension. Obesity is unchanged. BMI is is above average; BMI management plan is completed. Information provided on after visit summary..      Physical Exam  Constitutional:       General: She is not in acute distress.     Appearance: She is not ill-appearing.   HENT:      Head: Normocephalic.   Cardiovascular:      Rate and Rhythm: Normal rate and regular rhythm.      Heart sounds: Normal heart sounds. No murmur heard.  Pulmonary:      Breath sounds: Normal breath sounds.   Abdominal:      General: Bowel sounds are normal.      Tenderness: There is no abdominal tenderness.   Musculoskeletal:      Right lower leg: Edema present.      Left lower leg: Edema present.      Comments: Negative Giovanni's sign bilaterally; no redness. +1 pitting edema bilaterally    Lymphadenopathy:      Cervical: No cervical adenopathy.   Skin:     General: Skin is warm and dry.   Neurological:      General: No focal deficit present.      Mental Status: She is oriented to person, place, and time.   Psychiatric:         Mood and Affect: Mood normal.         Assessment / Plan      Assessment/Plan:   Diagnoses and all " orders for this visit:    1. Primary hypertension (Primary)  -     losartan-hydrochlorothiazide (Hyzaar) 50-12.5 MG per tablet; Take 1 tablet by mouth Daily.  Dispense: 90 tablet; Refill: 0    2. Lower extremity edema  -     hydroCHLOROthiazide (HYDRODIURIL) 12.5 MG tablet; Take 1 tablet by mouth daily as needed for edema  Dispense: 30 tablet; Refill: 0    3. Hyperglycemia  -     POC Glycosylated Hemoglobin (Hb A1C)       I discussed patient to take only medications prescribed to her.  I prescribed losartan-hydrochlorothiazide 50-12.5 mg.  She may take an extra hydrochlorothiazide 12.5 as needed for swelling. Explained and discussed patient's condition and plan of care.  Discussed when to follow-up.  Discussed possible red flags and how to follow-up with those.  Viewed patient's medications and discussed common side effects. Patient to continue current medications as advised.  Be compliant with medications. Patient to let me know if they have any worsening, no improvement, does not tolerate medication, or any future concerns about treatment. ER for emergencies.  Patient verbalized an understanding and agreement with plan of care.          Follow Up:   Return for Next scheduled follow up.    MIKE Schmidt  [facility]

## 2022-08-05 ENCOUNTER — TELEPHONE (OUTPATIENT)
Dept: INTERNAL MEDICINE | Facility: CLINIC | Age: 72
End: 2022-08-05

## 2022-08-05 DIAGNOSIS — M79.604 LEG PAIN, BILATERAL: ICD-10-CM

## 2022-08-05 DIAGNOSIS — R60.0 LOWER EXTREMITY EDEMA: Primary | ICD-10-CM

## 2022-08-05 DIAGNOSIS — M79.605 LEG PAIN, BILATERAL: ICD-10-CM

## 2022-08-05 RX ORDER — TIZANIDINE 2 MG/1
2 TABLET ORAL NIGHTLY PRN
Qty: 10 TABLET | Refills: 0 | Status: SHIPPED | OUTPATIENT
Start: 2022-08-05 | End: 2022-09-20

## 2022-08-05 NOTE — TELEPHONE ENCOUNTER
I will send in a muscle relaxer. Let her know they will make her sleepy.  If pain or swelling is severe go to ER for ultrasound. I'll put in an order for the ultrasound to check for blood clots.

## 2022-08-05 NOTE — TELEPHONE ENCOUNTER
----- Message from MIKE Schmidt sent at 8/4/2022 12:38 PM EDT -----  A1C is a lab tests that tells us what your average blood sugar is over the last three months.   A normal value is 5.5 or less.  A desired level is less than 7.0 (average blood sugar of less than 150) for diabetic patients.  Values between 5.7 and 6.4 may indicate prediabetes or risk for diabetes.

## 2022-08-05 NOTE — TELEPHONE ENCOUNTER
Patient notified and verbalized understanding.    Patient stated that she is still having pain in her calves. She is wondering if maybe a low dose muscle relaxer might help?

## 2022-08-06 ENCOUNTER — HOSPITAL ENCOUNTER (OUTPATIENT)
Dept: MRI IMAGING | Facility: HOSPITAL | Age: 72
Discharge: HOME | End: 2022-08-06
Admitting: INTERNAL MEDICINE

## 2022-08-06 DIAGNOSIS — R20.2 PARESTHESIA: ICD-10-CM

## 2022-08-06 PROCEDURE — 72148 MRI LUMBAR SPINE W/O DYE: CPT

## 2022-08-11 ENCOUNTER — TELEPHONE (OUTPATIENT)
Dept: INTERNAL MEDICINE | Facility: CLINIC | Age: 72
End: 2022-08-11

## 2022-08-12 ENCOUNTER — TELEPHONE (OUTPATIENT)
Dept: INTERNAL MEDICINE | Facility: CLINIC | Age: 72
End: 2022-08-12

## 2022-08-12 NOTE — TELEPHONE ENCOUNTER
Please call and check on her. She declined to have US. Make sure she is improving and DOES NOT want the US anymore.

## 2022-08-12 NOTE — TELEPHONE ENCOUNTER
Patient states her numbness has not improved. She wants to wait for MRI results before doing a US. Patient asking about results of MRI of her back and her ovarian screening she had done. She states she was never told the results

## 2022-08-22 ENCOUNTER — TELEPHONE (OUTPATIENT)
Dept: INTERNAL MEDICINE | Facility: CLINIC | Age: 72
End: 2022-08-22

## 2022-08-22 DIAGNOSIS — N88.9 ABNORMAL CERVIX FINDING: Primary | ICD-10-CM

## 2022-08-22 RX ORDER — GABAPENTIN 300 MG/1
CAPSULE ORAL
Qty: 90 CAPSULE | Refills: 2 | Status: SHIPPED | OUTPATIENT
Start: 2022-08-22 | End: 2023-04-03

## 2022-08-30 ENCOUNTER — TELEPHONE (OUTPATIENT)
Dept: INTERNAL MEDICINE | Facility: CLINIC | Age: 72
End: 2022-08-30

## 2022-08-30 DIAGNOSIS — R29.898 WEAKNESS OF BOTH LOWER EXTREMITIES: ICD-10-CM

## 2022-08-30 DIAGNOSIS — R20.0 NUMBNESS IN BOTH LEGS: Primary | ICD-10-CM

## 2022-08-30 DIAGNOSIS — M48.00 SPINAL STENOSIS, UNSPECIFIED SPINAL REGION: ICD-10-CM

## 2022-08-30 NOTE — TELEPHONE ENCOUNTER
Caller: Bharti Haines    Relationship to patient: Self    Best call back number:[phone]    Patient is needing: PATIENT STATED THAT SHE WOULD LIKE TO SPEAK WITH RICKY ABOUT GETTING MORE INFORMATION ABOUT MRI     PATIENT ALSO STATED THAT SHE IS JUST NOT FEELING WELL AND WANTED TO SPEAK WITH RICKY BEFORE SHE LEFT FOR THE DAY    PLEASE ADVISE

## 2022-08-30 NOTE — TELEPHONE ENCOUNTER
Spoke with patient, states she is having a terrible time with her sciatic pain.  States it is affecting both legs and that she cannot rest or be active.  States she had MRI about 3 weeks ago and that she wants Dr Guerrero to refer her to ortho to see if they can see anything on there and help her get some relief.  Explained that Dr Guerrero is gone for the day but will forward message to him.  Verbalized understanding.

## 2022-08-31 ENCOUNTER — TELEPHONE (OUTPATIENT)
Dept: NEUROSURGERY | Facility: CLINIC | Age: 72
End: 2022-08-31

## 2022-08-31 NOTE — TELEPHONE ENCOUNTER
Spoke with patient, states she has already had a call from scheduling and discussed her concerns with them  States last time she was referred to neurosurgery it was with Dr Jeong and that he does strokes and such and said he could not do anything for her.  Explained that Dr Guerrero ordered the referral and did put her diagnosis and symptoms on referral so they should schedule her with someone that can help her.  States she hopes so.

## 2022-08-31 NOTE — TELEPHONE ENCOUNTER
Left message voice mail for patient to please return call.  Ofc. # given.    HUB:  Please inform patient that Dr Guerrero has put in a referral to neurosurgery and someone will call her regarding appointment details.     Document if notified.

## 2022-08-31 NOTE — TELEPHONE ENCOUNTER
Caller: Bharti Haines    Relationship: Self    Best call back number: [phone]    What was the call regarding: RETURNING CALL, HUB RELAYED MESSAGE PATIENT HAS QUESTIONS ABOUT NEUROLOGY VS ORTHOPEDIC     Do you require a callback: YES PLEASE TO EXPLAIN DIFFERENCES AND ANSWER ADDITIONAL QUESTIONS

## 2022-09-01 NOTE — TELEPHONE ENCOUNTER
Please see encounter below, patient is requesting to switch from your care to Dr. Montejo's, is this okay from your standpoint?

## 2022-09-12 RX ORDER — PREDNISONE 10 MG/1
TABLET ORAL
Qty: 37 TABLET | Refills: 0 | Status: SHIPPED | OUTPATIENT
Start: 2022-09-12 | End: 2022-10-04

## 2022-09-20 ENCOUNTER — OFFICE VISIT (OUTPATIENT)
Dept: NEUROSURGERY | Facility: CLINIC | Age: 72
End: 2022-09-20

## 2022-09-20 VITALS — WEIGHT: 187.4 LBS | HEIGHT: 58 IN | TEMPERATURE: 98 F | BODY MASS INDEX: 39.34 KG/M2

## 2022-09-20 DIAGNOSIS — M51.36 DDD (DEGENERATIVE DISC DISEASE), LUMBAR: ICD-10-CM

## 2022-09-20 DIAGNOSIS — M48.061 SPINAL STENOSIS OF LUMBAR REGION WITHOUT NEUROGENIC CLAUDICATION: ICD-10-CM

## 2022-09-20 DIAGNOSIS — R20.0 NUMBNESS AND TINGLING OF BOTH LEGS: Primary | ICD-10-CM

## 2022-09-20 DIAGNOSIS — R20.2 NUMBNESS AND TINGLING OF BOTH LEGS: Primary | ICD-10-CM

## 2022-09-20 PROCEDURE — 99214 OFFICE O/P EST MOD 30 MIN: CPT | Performed by: NEUROLOGICAL SURGERY

## 2022-09-20 NOTE — PROGRESS NOTES
Patient: Bharti Haines  : 1950    Primary Care Provider: Marshal Guerrero MD    Requesting Provider: As above        History    Chief Complaint: Abdominal and lower extremity sensory alteration.    History of Present Illness: Ms. Haines is a 69-year-old woman who is known to my service.  On 10/22/2015 I performed a left L4-5 discectomy.  Her radicular symptoms improved but she has had chronic low back pain.  She complains of discomfort in her calves and feet but no severe pain.  She is not having much in the way of back pain.  Since July she has had numbness from her abdomen extending all the way down to her feet.  She has no arm, chest, upper back symptoms.  Oral steroids did not agree with her.  She is not really describing any severe walking or standing limitation.  She actually does a little better after she is warmed up and moves about.  Her symptoms in terms of the numbness seem to be worse at night.  She has no bowel or bladder dysfunction.    Review of Systems   Constitutional: Positive for activity change. Negative for appetite change, chills, diaphoresis, fatigue, fever and unexpected weight change.   HENT: Positive for hearing loss. Negative for congestion, dental problem, drooling, ear discharge, ear pain, facial swelling, mouth sores, nosebleeds, postnasal drip, rhinorrhea, sinus pressure, sinus pain, sneezing, sore throat, tinnitus, trouble swallowing and voice change.    Eyes: Negative for photophobia, pain, discharge, redness, itching and visual disturbance.   Respiratory: Negative for apnea, cough, choking, chest tightness, shortness of breath, wheezing and stridor.    Cardiovascular: Negative for chest pain, palpitations and leg swelling.   Gastrointestinal: Positive for constipation. Negative for abdominal distention, abdominal pain, anal bleeding, blood in stool, diarrhea, nausea, rectal pain and vomiting.   Endocrine: Negative for cold intolerance, heat intolerance, polydipsia,  "polyphagia and polyuria.   Genitourinary: Positive for decreased urine volume and difficulty urinating. Negative for dysuria, enuresis, flank pain, frequency, genital sores, hematuria and urgency.   Musculoskeletal: Positive for back pain. Negative for arthralgias, gait problem, joint swelling, myalgias, neck pain and neck stiffness.   Skin: Negative for color change, pallor, rash and wound.   Allergic/Immunologic: Positive for environmental allergies. Negative for food allergies and immunocompromised state.   Neurological: Positive for numbness. Negative for dizziness, tremors, seizures, syncope, facial asymmetry, speech difficulty, weakness, light-headedness and headaches.   Hematological: Negative for adenopathy. Does not bruise/bleed easily.   Psychiatric/Behavioral: Positive for sleep disturbance. Negative for agitation, behavioral problems, confusion, decreased concentration, dysphoric mood, hallucinations, self-injury and suicidal ideas. The patient is not nervous/anxious and is not hyperactive.    All other systems reviewed and are negative.      The patient's past medical history, past surgical history, family history, and social history have been reviewed at length in the electronic medical record.      Physical Exam:   Temp 98 °F (36.7 °C)   Ht 147.3 cm (57.99\")   Wt 85 kg (187 lb 6.4 oz)   LMP  (LMP Unknown)   BMI 39.18 kg/m²   CONSTITUTIONAL: Patient is well-nourished, pleasant and appears stated age.  MUSCULOSKELETAL:  Straight leg raising is negative.  Zkai's Sign is negative.  ROM in the low back is normal.  Right leg raising is negative.  Zaki signs are negative.  Tenderness in the back to palpation is not observed.  NEUROLOGICAL:  Orientation, memory, attention span, language function, and cognition have been examined and are intact.  Strength is intact in the lower extremities to direct testing.  Muscle tone is normal throughout.  Station and gait are normal.  Sensation is altered to " light touch testing from the umbilicus down through her lower extremities to the feet.  Deep tendon reflexes are 1+ and symmetrical.  Otilio sign is negative.  There is no clonus at the ankles.  Coordination is intact.      Medical Decision Making    Data Review:   (All imaging studies were personally reviewed unless stated otherwise)  MRI of the lumbar spine dated 8/6/2022 is compared to a study from 2/19/2021.  There may be some progression of the stenosis that is noted.  She has severe narrowing at L3-4, L4-5, and L5-S1, more so on the left at L5-S1.  There is diffuse degenerative disc disease and facet arthropathy.    Diagnosis:   1.  Numbness from the abdomen into the lower extremities.  2.  Lumbar stenosis without clear neurogenic claudication.    Treatment Options:   The patient's main complaint today is numbness from the abdomen down.  Her history is a bit convoluted and honestly it is a bit hard to pin her down as to what all of her complaints are.  At this point I am not really getting a picture of neurogenic claudication from her lumbar stenosis.  Given the abdominal and lower extremity sensory alteration I am going to check an MRI of her cervical and thoracic spine.  She will follow-up thereafter.       Diagnosis Plan   1. Numbness and tingling of both legs  MRI Cervical Spine Without Contrast    MRI Thoracic Spine Without Contrast   2. Spinal stenosis of lumbar region without neurogenic claudication     3. DDD (degenerative disc disease), lumbar         Scribed for Hipolito Montejo MD by SKYLAR Harrison 9/20/2022 13:12 EDT      I, Dr. Montejo, personally performed the services described in the documentation, as scribed in my presence, and it is both accurate and complete.

## 2022-09-21 ENCOUNTER — TELEPHONE (OUTPATIENT)
Dept: INTERNAL MEDICINE | Facility: CLINIC | Age: 72
End: 2022-09-21

## 2022-09-21 NOTE — TELEPHONE ENCOUNTER
Spoke with patient, states she saw Dr Montejo yesterday and based on her symptoms he does not feel it is a back issue.  States he did order MRI of different area of her back but not sure if she should go ahead and have that done or wait until she see Gyn in October.  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  States Dr Deleon has retired and she is scheduled to see Dr Avril Enriquez in October.  States she wanted to relay this info to Dr Guerrero in hopes he would review it.  Explained I will pass message onto him.

## 2022-09-21 NOTE — TELEPHONE ENCOUNTER
Caller: Vannessa Haines    Relationship: Self    Best call back number: [phone]    What is the best time to reach you: AFTER 4:00 PM ONLY    Who are you requesting to speak with (clinical staff, provider,  specific staff member): RICKY    Do you know the name of the person who called: VANNESSA HAINES    What was the call regarding: PATIENT WAS CALLING IN REFERENCE TO REFERRALS AND OTHER ISSUES.    Do you require a callback: YES

## 2022-09-27 ENCOUNTER — TELEPHONE (OUTPATIENT)
Dept: OBSTETRICS AND GYNECOLOGY | Facility: CLINIC | Age: 72
End: 2022-09-27

## 2022-09-27 NOTE — TELEPHONE ENCOUNTER
Called and spoke with patient.  She states that Dr. Guerrero has referred her over to GYN for abnormal cervical findings (thickening lining).  She is scheduled with Avril Enriquez on 10/4 already.  She reports that she has also been losing sensation/feeling in vaginal/pelvic region and is curious if there is anything else that needs to be done prior to exam or at least to just express that this is something else that she would like to discuss at her upcoming appointment.  I informed patient that the first step would of course be getting her in for her exam and then if there need to be any further referrals placed after Avril is able to examine and speak with her, that would of course take place as needed.  Patient verified understanding and was very appreciative.     Patient did not need any further assistance at this time and will be here for 10/4 appointment at 11am.

## 2022-09-27 NOTE — TELEPHONE ENCOUNTER
Pt hasn't been seen yet has upcoming apt - but has questions about the exam and what Dr Rizzo has requested - please call her   [phone] or [phone]

## 2022-10-04 ENCOUNTER — OFFICE VISIT (OUTPATIENT)
Dept: OBSTETRICS AND GYNECOLOGY | Facility: CLINIC | Age: 72
End: 2022-10-04

## 2022-10-04 ENCOUNTER — TELEPHONE (OUTPATIENT)
Dept: OBSTETRICS AND GYNECOLOGY | Facility: CLINIC | Age: 72
End: 2022-10-04

## 2022-10-04 VITALS
HEIGHT: 58 IN | SYSTOLIC BLOOD PRESSURE: 160 MMHG | DIASTOLIC BLOOD PRESSURE: 88 MMHG | BODY MASS INDEX: 40.85 KG/M2 | WEIGHT: 194.6 LBS

## 2022-10-04 DIAGNOSIS — N95.2 ATROPHY OF VAGINA: ICD-10-CM

## 2022-10-04 DIAGNOSIS — R20.2 PARESTHESIA OF BOTH LOWER EXTREMITIES: Primary | ICD-10-CM

## 2022-10-04 DIAGNOSIS — R20.2 PARESTHESIA OF SADDLE AREA: ICD-10-CM

## 2022-10-04 DIAGNOSIS — R20.2 PARESTHESIA OF BUTTOCK: ICD-10-CM

## 2022-10-04 DIAGNOSIS — B35.4 TINEA CORPORIS: ICD-10-CM

## 2022-10-04 PROCEDURE — 99214 OFFICE O/P EST MOD 30 MIN: CPT | Performed by: NURSE PRACTITIONER

## 2022-10-04 RX ORDER — ESTRADIOL 10 UG/1
1 INSERT VAGINAL 2 TIMES WEEKLY
Qty: 8 TABLET | Refills: 12 | Status: SHIPPED | OUTPATIENT
Start: 2022-10-06 | End: 2023-03-01

## 2022-10-04 RX ORDER — NYSTATIN 100000 U/G
1 OINTMENT TOPICAL 2 TIMES DAILY
Qty: 30 G | Refills: 2 | Status: SHIPPED | OUTPATIENT
Start: 2022-10-04 | End: 2023-03-01

## 2022-10-04 NOTE — TELEPHONE ENCOUNTER
I have spoken with the patient and she would like to try compounded estradiol vaginal cream.  I will phone prescription to [facility].    Compounded estradiol vaginal cream 0.1 mg/gm  30 gram tube, propylene glycol free  0.5 grams by vaginal route 2 X week  3 refills

## 2022-10-04 NOTE — PROGRESS NOTES
Chief Complaint  Bharti Haines is a 72 y.o.  female presenting for Gynecologic Exam (New GYN, former patient of Dr. Gómez.  Patient had MRI 22 which showed abnormal appearance of the endometrial canal. 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 Gómez's.  She comes in today for a problem visit only.  She declines a clinical breast exam.  She had mammogram .  (She also has a PMHx of right breast cancer.)  She explains to me that she has not ever had any imaging (personally) that revealed an abnormality in the endometrial canal.  No PMB.  (But, that was something that she read about, which can accompany the symptoms she has.)      She c/o paresthesias from the umbilicus and all lower parts.  It started in late / early July, and her first imaging was Aug 6.  It affected the lower abdomen first, then the entire saddle area, and now all down legs bilaterally.  States the numbness in her legs is now worsening, and goes down the lateral aspect to almost the ankles.      The following portions of the patient's history were reviewed and updated as appropriate: allergies, current medications, past family history, past medical history, past social history, past surgical history and problem list.    Allergies   Allergen Reactions   • Prilosec [Omeprazole] Myalgia   • Zofran [Ondansetron Hcl] Itching   • Cefdinir Rash and Other (See Comments)   • Macrolides And Ketolides Rash   • Penicillins Rash   • Percocet [Oxycodone-Acetaminophen] Rash         Current Outpatient Medications:   •  aspirin tablet, Take 81 mg by mouth daily., Disp: , Rfl:   •  diclofenac (VOLTAREN) 75 MG EC tablet, Take 1 tablet by mouth 2 (Two) Times a Day As Needed (prn)., Disp: 60 tablet, Rfl: 1  •  [START ON 10/6/2022] estradiol (Yuvafem) 10 MCG tablet vaginal tablet, Insert 1  "tablet into the vagina 2 (Two) Times a Week., Disp: 8 tablet, Rfl: 12  •  gabapentin (NEURONTIN) 300 MG capsule, TAKE ONE CAPSULE BY MOUTH THREE TIMES A DAY, Disp: 90 capsule, Rfl: 2  •  losartan-hydrochlorothiazide (Hyzaar) 50-12.5 MG per tablet, Take 1 tablet by mouth Daily., Disp: 90 tablet, Rfl: 0  •  Multiple Minerals-Vitamins (CITRACAL PLUS PO), Take  by mouth., Disp: , Rfl:   •  nystatin (MYCOSTATIN) 657098 UNIT/GM ointment, Apply 1 application topically to the appropriate area as directed 2 (Two) Times a Day., Disp: 30 g, Rfl: 2  •  predniSONE (DELTASONE) 10 MG tablet, 2 po bid x 5 days; 1 po bid x 5 days; 1 po daily x 5 days; 1/2 po daily x 4 days, Disp: 37 tablet, Rfl: 0    Past Medical History:   Diagnosis Date   • Allergic    • Arthritis    • Back problem    • Breast cancer (HCC)     right   • Cancer (HCC)     breast   • Cataract    • History of breast cancer    • HL (hearing loss)    • Hx of radiation therapy     right breast   • Hyperlipidemia    • Hypertension    • Menopause    • Osteoarthritis    • Peptic ulcer    • Sciatica         Past Surgical History:   Procedure Laterality Date   • BACK SURGERY      left L4-5 discectomy, Dr. Montejo. [facility]. [place], [place]   • BREAST BIOPSY Right 2008    US bx   • BREAST LUMPECTOMY Right 2008   • BREAST SURGERY     • CARPAL TUNNEL RELEASE Bilateral    •  SECTION     • JOINT REPLACEMENT Left 2021    total hip on left   • REPLACEMENT TOTAL KNEE Bilateral        Objective  /88   Ht 147.3 cm (57.99\")   Wt 88.3 kg (194 lb 9.6 oz)   LMP  (LMP Unknown)   Breastfeeding No   BMI 40.69 kg/m²     Physical Exam  Exam conducted with a chaperone present.   Constitutional:       Appearance: Normal appearance.   Abdominal:      General: Bowel sounds are normal.      Palpations: Abdomen is soft. There is no mass.      Tenderness: There is no abdominal tenderness. There is no right CVA tenderness or left CVA tenderness.      " "Comments: She c/o feeling numb all across the lower abdomen.   Genitourinary:     Labia:         Right: No rash, tenderness or lesion.         Left: No rash, tenderness or lesion.       Vagina: Erythema present.      Cervix: No cervical motion tenderness, discharge, lesion or erythema.      Uterus: Normal. Not enlarged and not tender.       Adnexa: Right adnexa normal and left adnexa normal.        Right: No mass or tenderness.          Left: No mass or tenderness.        Comments: There is obvious tinea corporis (mild) in groin bilat, and slightly under lower abd fold.  (She denies feeling any itching.)  There is marked atrophic erythema of the vaginal mucosa, but no abnormal discharge.  (She denies feeling any itching or burning.  But, notes that sexual activity \"didn't feel right\".)    Anus appears wnl.  (No rectal exam performed.)  Skin:     General: Skin is warm and dry.   Neurological:      Mental Status: She is oriented to person, place, and time.         Assessment/Plan   Diagnoses and all orders for this visit:    1. Paresthesia of both lower extremities (Primary)    2. Paresthesia of saddle area    3. Paresthesia of buttock    4. Tinea corporis    5. Atrophy of vagina    Other orders  -     nystatin (MYCOSTATIN) 711309 UNIT/GM ointment; Apply 1 application topically to the appropriate area as directed 2 (Two) Times a Day.  Dispense: 30 g; Refill: 2  -     estradiol (Yuvafem) 10 MCG tablet vaginal tablet; Insert 1 tablet into the vagina 2 (Two) Times a Week.  Dispense: 8 tablet; Refill: 12    We called to attempt sooner appt with Dr. Guerrero.  She may need sooner appt & imaging, since the paresthesia is worsening in her legs.      We discussed how to use the meds for tinea & atrophy.  Will FU 3mo.    Procedures            Return in about 3 months (around 1/4/2023), or if symptoms worsen or fail to improve, for Refer back to Dr. Guerrero for worsening numbness, lower abd/saddle area/legs " (bilat).    Yoko Enriquez, APRN  10/04/2022

## 2022-10-04 NOTE — TELEPHONE ENCOUNTER
Caller: VANNESSA RENTERIA    Relationship to patient: SELF    Best call back number: [phone]    Patient is needing: PATIENT CALLED AND STATED THAT SHE WAS PRESCRIBED TO TWO MEDICATIONS THIS MORNING BY [facility] BUT WAS TOLD THAT THERE WAS POSSIBLY A LESS EXPENSIVE OPTION AND WOULD LIKE TO KNOW WHAT THAT OPTION WAS. SHE THINKS THERE IS ANOTHER PHARMACY OFF [facility] THAT SHE CAN GET THEM FROM [employer] AND WOULD LIKE TO KNOW IF THAT CAN BE ARRANGED FOR HER.     AT THE [facility] IN [facility] [place] [facility] THE PRICES ARE:    SUPPOSITORY 84 DAYS $400+  1 OZ CREAM FOR ITCH $20+

## 2022-10-12 ENCOUNTER — FLU SHOT (OUTPATIENT)
Dept: INTERNAL MEDICINE | Facility: CLINIC | Age: 72
End: 2022-10-12

## 2022-10-12 DIAGNOSIS — Z23 NEED FOR INFLUENZA VACCINATION: Primary | ICD-10-CM

## 2022-10-12 PROCEDURE — G0008 ADMIN INFLUENZA VIRUS VAC: HCPCS | Performed by: NURSE PRACTITIONER

## 2022-10-12 PROCEDURE — 90662 IIV NO PRSV INCREASED AG IM: CPT | Performed by: NURSE PRACTITIONER

## 2022-10-19 ENCOUNTER — OFFICE VISIT (OUTPATIENT)
Dept: NEUROSURGERY | Facility: CLINIC | Age: 72
End: 2022-10-19

## 2022-10-19 ENCOUNTER — PREP FOR SURGERY (OUTPATIENT)
Dept: OTHER | Facility: HOSPITAL | Age: 72
End: 2022-10-19

## 2022-10-19 VITALS — WEIGHT: 194 LBS | BODY MASS INDEX: 40.72 KG/M2 | HEIGHT: 58 IN | TEMPERATURE: 97.8 F

## 2022-10-19 DIAGNOSIS — D32.1 SPINAL MENINGIOMA: Primary | ICD-10-CM

## 2022-10-19 PROCEDURE — 99214 OFFICE O/P EST MOD 30 MIN: CPT | Performed by: NEUROLOGICAL SURGERY

## 2022-10-19 RX ORDER — CHLORHEXIDINE GLUCONATE 4 G/100ML
SOLUTION TOPICAL
Qty: 120 ML | Refills: 0 | Status: ON HOLD | OUTPATIENT
Start: 2022-10-19 | End: 2022-10-27

## 2022-10-19 RX ORDER — DEXAMETHASONE 4 MG/1
4 TABLET ORAL 2 TIMES DAILY
Qty: 60 TABLET | Refills: 0 | Status: CANCELLED | OUTPATIENT
Start: 2022-10-19

## 2022-10-19 RX ORDER — DEXAMETHASONE 2 MG/1
2 TABLET ORAL 3 TIMES DAILY
Qty: 90 TABLET | Refills: 0 | Status: ON HOLD | OUTPATIENT
Start: 2022-10-19 | End: 2022-10-27

## 2022-10-19 RX ORDER — FAMOTIDINE 20 MG/1
20 TABLET, FILM COATED ORAL
Status: CANCELLED | OUTPATIENT
Start: 2022-10-19

## 2022-10-19 NOTE — H&P
Patient: Bharti Haines  : 1950     Primary Care Provider: Marshal Guerrero MD     Requesting Provider: As above           History     Chief Complaint: Abdominal and lower extremity sensory alteration.     History of Present Illness: Ms. Haines is a 72-year-old woman who is known to my service.  On 10/22/2015 I performed a left L4-5 discectomy.  Her radicular symptoms improved but she has had chronic low back pain.  She complains of discomfort in her calves and feet but no severe pain.  She is not having much in the way of back pain.  Since July she has had numbness from her abdomen extending all the way down to her feet.  She has no arm, chest, upper back symptoms.  Oral steroids did not agree with her.  She is not really describing any severe walking or standing limitation.  She actually does a little better after she is warmed up and moves about.  Her symptoms in terms of the numbness seem to be worse at night.  She has no bowel or bladder dysfunction.  Her numbness may be a little bit worse.  She feels that her legs are little bit heavy.  She continues to ambulate with a cane.     Review of Systems   Constitutional: Positive for activity change. Negative for appetite change, chills, diaphoresis, fatigue, fever and unexpected weight change.   HENT: Positive for hearing loss. Negative for congestion, dental problem, drooling, ear discharge, ear pain, facial swelling, mouth sores, nosebleeds, postnasal drip, rhinorrhea, sinus pressure, sinus pain, sneezing, sore throat, tinnitus, trouble swallowing and voice change.    Eyes: Negative for photophobia, pain, discharge, redness, itching and visual disturbance.   Respiratory: Negative for apnea, cough, choking, chest tightness, shortness of breath, wheezing and stridor.    Cardiovascular: Negative for chest pain, palpitations and leg swelling.   Gastrointestinal: Positive for constipation. Negative for abdominal distention, abdominal pain, anal bleeding,  blood in stool, diarrhea, nausea, rectal pain and vomiting.   Endocrine: Negative for cold intolerance, heat intolerance, polydipsia, polyphagia and polyuria.   Genitourinary: Positive for decreased urine volume and difficulty urinating. Negative for dysuria, enuresis, flank pain, frequency, genital sores, hematuria and urgency.   Musculoskeletal: Positive for back pain. Negative for arthralgias, gait problem, joint swelling, myalgias, neck pain and neck stiffness.   Skin: Negative for color change, pallor, rash and wound.   Allergic/Immunologic: Positive for environmental allergies. Negative for food allergies and immunocompromised state.   Neurological: Positive for numbness. Negative for dizziness, tremors, seizures, syncope, facial asymmetry, speech difficulty, weakness, light-headedness and headaches.   Hematological: Negative for adenopathy. Does not bruise/bleed easily.   Psychiatric/Behavioral: Positive for sleep disturbance. Negative for agitation, behavioral problems, confusion, decreased concentration, dysphoric mood, hallucinations, self-injury and suicidal ideas. The patient is not nervous/anxious and is not hyperactive.    All other systems reviewed and are negative.        The patient's past medical history, past surgical history, family history, and social history have been reviewed at length in the electronic medical record.  Past Medical History:   Diagnosis Date   • Allergic    • Arthritis    • Back problem    • Breast cancer (HCC) 2008    right   • Cancer (HCC)     breast   • Cataract    • History of breast cancer    • HL (hearing loss)    • Hx of radiation therapy 2008    right breast   • Hyperlipidemia    • Hypertension    • Menopause    • Osteoarthritis    • Peptic ulcer    • Sciatica      Past Surgical History:   Procedure Laterality Date   • BACK SURGERY  2015    left L4-5 discectomy, Dr. Montejo. [facility]. [place], [place]   • BREAST BIOPSY Right 02/26/2008     bx   • BREAST LUMPECTOMY  Right 2008   • BREAST SURGERY     • CARPAL TUNNEL RELEASE Bilateral    •  SECTION     • JOINT REPLACEMENT Left 2021    total hip on left   • REPLACEMENT TOTAL KNEE Bilateral      Family History   Problem Relation Age of Onset   • Hypertension Mother    • Cancer Mother    • Breast cancer Mother         pt states 60's   • Stroke Mother    • Hypertension Father    • Heart disease Father    • Stroke Father    • Diabetes Sister    • Hypertension Sister    • Osteoarthritis Sister    • Osteoporosis Sister    • Alzheimer's disease Sister    • Kidney disease Brother    • Kidney disease Brother    • Colon cancer Brother    • Cancer Maternal Aunt    • Breast cancer Maternal Aunt         pt states 60's   • Arthritis Other    • Ovarian cancer Neg Hx      Social History     Socioeconomic History   • Marital status:    Tobacco Use   • Smoking status: Never   • Smokeless tobacco: Never   Vaping Use   • Vaping Use: Never used   Substance and Sexual Activity   • Alcohol use: No   • Drug use: No   • Sexual activity: Yes     Partners: Male     Birth control/protection: Post-menopausal           Allergies   Allergen Reactions   • Prilosec [Omeprazole] Myalgia   • Zofran [Ondansetron Hcl] Itching   • Cefdinir Rash and Other (See Comments)   • Macrolides And Ketolides Rash   • Penicillins Rash   • Percocet [Oxycodone-Acetaminophen] Rash       Current Outpatient Medications on File Prior to Visit   Medication Sig Dispense Refill   • aspirin tablet Take 81 mg by mouth daily.     • dexamethasone (DECADRON) 2 MG tablet Take 1 tablet by mouth 3 (Three) Times a Day. 90 tablet 0   • diclofenac (VOLTAREN) 75 MG EC tablet Take 1 tablet by mouth 2 (Two) Times a Day As Needed (prn). (Patient taking differently: Take 1 tablet by mouth As Needed (prn).) 60 tablet 1   • estradiol (Yuvafem) 10 MCG tablet vaginal tablet Insert 1 tablet into the vagina 2 (Two) Times a Week. 8 tablet 12   • gabapentin (NEURONTIN) 300 MG capsule  "TAKE ONE CAPSULE BY MOUTH THREE TIMES A DAY (Patient taking differently: Take 1 capsule by mouth Every Night.) 90 capsule 2   • losartan-hydrochlorothiazide (Hyzaar) 50-12.5 MG per tablet Take 1 tablet by mouth Daily. 90 tablet 0   • Multiple Minerals-Vitamins (CITRACAL PLUS PO) Take  by mouth.     • nystatin (MYCOSTATIN) [id number] UNIT/GM ointment Apply 1 application topically to the appropriate area as directed 2 (Two) Times a Day. 30 g 2     No current facility-administered medications on file prior to visit.            Physical Exam:   Temp 97.8 °F (36.6 °C) (Infrared)   Ht 147.3 cm (57.99\")   Wt 88 kg (194 lb)   LMP  (LMP Unknown)   BMI 40.56 kg/m²   Her gait is somewhat unsteady but independent with her cane.  Extremity reflexes are 1+ and symmetrical.  No clonus is elicited at the ankle.  Sensation is altered in her abdomen and in her legs globally.     Medical Decision Making     Data Review:   (All imaging studies were personally reviewed unless stated otherwise)  MRI of the lumbar spine dated 8/6/2022 is compared to a study from 2/19/2021.  There may be some progression of the stenosis that is noted.  She has severe narrowing at L3-4, L4-5, and L5-S1, more so on the left at L5-S1.  There is diffuse degenerative disc disease and facet arthropathy.     MRI of the cervical spine demonstrates some diffuse spondylosis with moderate canal narrowing.  I do not see sebastián signal change within the cord.     MRI of the thoracic spine demonstrates an extra-axial lesion suggestive of meningioma at the T7 level.  This appears somewhat dark on the T2 sequences and could be partially calcified.     Diagnosis:   1.  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  2.  Lumbar stenosis without neurogenic claudication.     Treatment Options:   I placed the patient on Decadron 2 mg p.o. 3 times daily.  We are going to make arrangements for thoracic laminectomy for " resection of the above-noted tumor.  The patient is fearful of surgery but without surgery I suspect that she is going to go on to develop progressive and significant neurologic compromise.  The goal of surgery is to prevent progression of her current neurologic difficulties.  Surgery may not reverse her current neurologic deficit.  The nature of the procedure as well as the potential risks, complications, limitations, and alternatives to the procedure were discussed at length with the patient and the patient has agreed to proceed with surgery.          Diagnosis Plan   1. Spinal meningioma (HCC)

## 2022-10-19 NOTE — PROGRESS NOTES
Patient: Bharti Haines  : 1950    Primary Care Provider: Marshal Guerrero MD    Requesting Provider: As above        History    Chief Complaint: Abdominal and lower extremity sensory alteration.    History of Present Illness: Ms. Haines is a 72-year-old woman who is known to my service.  On 10/22/2015 I performed a left L4-5 discectomy.  Her radicular symptoms improved but she has had chronic low back pain.  She complains of discomfort in her calves and feet but no severe pain.  She is not having much in the way of back pain.  Since July she has had numbness from her abdomen extending all the way down to her feet.  She has no arm, chest, upper back symptoms.  Oral steroids did not agree with her.  She is not really describing any severe walking or standing limitation.  She actually does a little better after she is warmed up and moves about.  Her symptoms in terms of the numbness seem to be worse at night.  She has no bowel or bladder dysfunction.  Her numbness may be a little bit worse.  She feels that her legs are little bit heavy.  She continues to ambulate with a cane.    Review of Systems   Constitutional: Positive for activity change. Negative for appetite change, chills, diaphoresis, fatigue, fever and unexpected weight change.   HENT: Positive for hearing loss. Negative for congestion, dental problem, drooling, ear discharge, ear pain, facial swelling, mouth sores, nosebleeds, postnasal drip, rhinorrhea, sinus pressure, sinus pain, sneezing, sore throat, tinnitus, trouble swallowing and voice change.    Eyes: Negative for photophobia, pain, discharge, redness, itching and visual disturbance.   Respiratory: Negative for apnea, cough, choking, chest tightness, shortness of breath, wheezing and stridor.    Cardiovascular: Negative for chest pain, palpitations and leg swelling.   Gastrointestinal: Positive for constipation. Negative for abdominal distention, abdominal pain, anal bleeding, blood in  "stool, diarrhea, nausea, rectal pain and vomiting.   Endocrine: Negative for cold intolerance, heat intolerance, polydipsia, polyphagia and polyuria.   Genitourinary: Positive for decreased urine volume and difficulty urinating. Negative for dysuria, enuresis, flank pain, frequency, genital sores, hematuria and urgency.   Musculoskeletal: Positive for back pain. Negative for arthralgias, gait problem, joint swelling, myalgias, neck pain and neck stiffness.   Skin: Negative for color change, pallor, rash and wound.   Allergic/Immunologic: Positive for environmental allergies. Negative for food allergies and immunocompromised state.   Neurological: Positive for numbness. Negative for dizziness, tremors, seizures, syncope, facial asymmetry, speech difficulty, weakness, light-headedness and headaches.   Hematological: Negative for adenopathy. Does not bruise/bleed easily.   Psychiatric/Behavioral: Positive for sleep disturbance. Negative for agitation, behavioral problems, confusion, decreased concentration, dysphoric mood, hallucinations, self-injury and suicidal ideas. The patient is not nervous/anxious and is not hyperactive.    All other systems reviewed and are negative.      The patient's past medical history, past surgical history, family history, and social history have been reviewed at length in the electronic medical record.      Physical Exam:   Temp 97.8 °F (36.6 °C) (Infrared)   Ht 147.3 cm (57.99\")   Wt 88 kg (194 lb)   LMP  (LMP Unknown)   BMI 40.56 kg/m²   Her gait is somewhat unsteady but independent with her cane.  Extremity reflexes are 1+ and symmetrical.  No clonus is elicited at the ankle.  Sensation is altered in her abdomen and in her legs globally.    Medical Decision Making    Data Review:   (All imaging studies were personally reviewed unless stated otherwise)  MRI of the lumbar spine dated 8/6/2022 is compared to a study from 2/19/2021.  There may be some progression of the stenosis that " is noted.  She has severe narrowing at L3-4, L4-5, and L5-S1, more so on the left at L5-S1.  There is diffuse degenerative disc disease and facet arthropathy.    MRI of the cervical spine demonstrates some diffuse spondylosis with moderate canal narrowing.  I do not see sebastián signal change within the cord.    MRI of the thoracic spine demonstrates an extra-axial lesion suggestive of meningioma at the T7 level.  This appears somewhat dark on the T2 sequences and could be partially calcified.    Diagnosis:   1.  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  2.  Lumbar stenosis without neurogenic claudication.    Treatment Options:   I placed the patient on Decadron 2 mg p.o. 3 times daily.  We are going to make arrangements for thoracic laminectomy for resection of the above-noted tumor.  The patient is fearful of surgery but without surgery I suspect that she is going to go on to develop progressive and significant neurologic compromise.  The goal of surgery is to prevent progression of her current neurologic difficulties.  Surgery may not reverse her current neurologic deficit.  The nature of the procedure as well as the potential risks, complications, limitations, and alternatives to the procedure were discussed at length with the patient and the patient has agreed to proceed with surgery.       Diagnosis Plan   1. Spinal meningioma (HCC)            Scribed for Hipolito Montejo MD by SKYLRA Harrison 10/19/2022 13:32 EDT      I, Dr. Montejo, personally performed the services described in the documentation, as scribed in my presence, and it is both accurate and complete.

## 2022-10-25 ENCOUNTER — PREP FOR SURGERY (OUTPATIENT)
Dept: OTHER | Facility: HOSPITAL | Age: 72
End: 2022-10-25

## 2022-10-25 ENCOUNTER — PRE-ADMISSION TESTING (OUTPATIENT)
Dept: PREADMISSION TESTING | Facility: HOSPITAL | Age: 72
End: 2022-10-25

## 2022-10-25 VITALS — BODY MASS INDEX: 38.93 KG/M2 | HEIGHT: 59 IN | WEIGHT: 193.12 LBS

## 2022-10-25 LAB
DEPRECATED RDW RBC AUTO: 40.7 FL (ref 37–54)
ERYTHROCYTE [DISTWIDTH] IN BLOOD BY AUTOMATED COUNT: 12.8 % (ref 12.3–15.4)
HBA1C MFR BLD: 5.9 % (ref 4.8–5.6)
HCT VFR BLD AUTO: 39.4 % (ref 34–46.6)
HGB BLD-MCNC: 13.8 G/DL (ref 12–15.9)
MCH RBC QN AUTO: 30.7 PG (ref 26.6–33)
MCHC RBC AUTO-ENTMCNC: 35 G/DL (ref 31.5–35.7)
MCV RBC AUTO: 87.8 FL (ref 79–97)
PLATELET # BLD AUTO: 256 10*3/MM3 (ref 140–450)
PMV BLD AUTO: 9.3 FL (ref 6–12)
POTASSIUM SERPL-SCNC: 3.9 MMOL/L (ref 3.5–5.2)
QT INTERVAL: 428 MS
QTC INTERVAL: 405 MS
RBC # BLD AUTO: 4.49 10*6/MM3 (ref 3.77–5.28)
WBC NRBC COR # BLD: 7.72 10*3/MM3 (ref 3.4–10.8)

## 2022-10-25 PROCEDURE — 85027 COMPLETE CBC AUTOMATED: CPT

## 2022-10-25 PROCEDURE — 93005 ELECTROCARDIOGRAM TRACING: CPT

## 2022-10-25 PROCEDURE — 84132 ASSAY OF SERUM POTASSIUM: CPT

## 2022-10-25 PROCEDURE — 93010 ELECTROCARDIOGRAM REPORT: CPT | Performed by: INTERNAL MEDICINE

## 2022-10-25 PROCEDURE — 83036 HEMOGLOBIN GLYCOSYLATED A1C: CPT

## 2022-10-25 PROCEDURE — 36415 COLL VENOUS BLD VENIPUNCTURE: CPT

## 2022-10-25 NOTE — PAT
Patient did not review general PAT education video as instructed in their preoperative information received from their surgeon.  One-on-one Pre Admission Testing general education provided during PAT visit.  Copies of PAT general education handouts (Incentive Spirometry, Meds to Beds Program, Patient Belongings, Pre-op skin preparation instructions, Blood Glucose testing, Visitor policy, Surgery FAQ, Code H) distributed to patient. Encouraged patient/family to read PAT general education handouts thoroughly and notify PAT staff with any questions or concerns. Patient instructed to bring PAT pass and completed skin prep sheet (if applicable) on the day of procedure. Patient verbalized understanding of all information and priority content.     Bactroban (if prescribed) and Chlorhexidine Prescription prescribed by physician before PAT visit.  Verified with patient that medication(s) were picked up from their pharmacy.  Written instructions given to patient during PAT visit.  Patient/family also instructed to complete skin prep checklist and return the checklist on the day of surgery to preoperative staff.  Patient/family verbalized understanding.  Pt already started Bactroban, unable to do MRSA swab in PAT.       Patient to apply Chlorhexadine wipes  to surgical area (as instructed) the night before procedure and the AM of procedure. Wipes provided.    Patient instructed to drink 20 ounces of Gatorade and it needs to be completed 1 hour (for Main OR patients) or 2 hours (scheduled  section & [facility] patients) before given arrival time for procedure (NO RED Gatorade)    Patient verbalized understanding.    Per Anesthesia Request, patient instructed not to take their ACE/ARB medications on the AM of surgery.    Notified Miguel, in Dr. Montejo's office, to have PA verify consent order.  Please obtain consent DOS.    EKG CLEARED BY DR. MICHAELS.

## 2022-10-26 ENCOUNTER — ANESTHESIA EVENT (OUTPATIENT)
Dept: PERIOP | Facility: HOSPITAL | Age: 72
End: 2022-10-26

## 2022-10-26 RX ORDER — FAMOTIDINE 10 MG/ML
20 INJECTION, SOLUTION INTRAVENOUS ONCE
Status: CANCELLED | OUTPATIENT
Start: 2022-10-26 | End: 2022-10-26

## 2022-10-26 RX ORDER — FAMOTIDINE 20 MG/1
20 TABLET, FILM COATED ORAL ONCE
Status: CANCELLED | OUTPATIENT
Start: 2022-10-26 | End: 2022-10-26

## 2022-10-27 ENCOUNTER — APPOINTMENT (OUTPATIENT)
Dept: GENERAL RADIOLOGY | Facility: HOSPITAL | Age: 72
End: 2022-10-27

## 2022-10-27 ENCOUNTER — HOSPITAL ENCOUNTER (INPATIENT)
Facility: HOSPITAL | Age: 72
LOS: 4 days | Discharge: HOME HEALTH | End: 2022-10-31
Attending: NEUROLOGICAL SURGERY | Admitting: NEUROLOGICAL SURGERY

## 2022-10-27 ENCOUNTER — ANESTHESIA (OUTPATIENT)
Dept: PERIOP | Facility: HOSPITAL | Age: 72
End: 2022-10-27

## 2022-10-27 DIAGNOSIS — D32.1 SPINAL MENINGIOMA: ICD-10-CM

## 2022-10-27 LAB — GLUCOSE BLDC GLUCOMTR-MCNC: 116 MG/DL (ref 70–130)

## 2022-10-27 PROCEDURE — P9041 ALBUMIN (HUMAN),5%, 50ML: HCPCS | Performed by: NURSE ANESTHETIST, CERTIFIED REGISTERED

## 2022-10-27 PROCEDURE — 25010000002 HYDROMORPHONE PER 4 MG: Performed by: NURSE ANESTHETIST, CERTIFIED REGISTERED

## 2022-10-27 PROCEDURE — 63710000001 HYDROCODONE-ACETAMINOPHEN 7.5-325 MG TABLET: Performed by: NEUROLOGICAL SURGERY

## 2022-10-27 PROCEDURE — 63710000001 HYDROCHLOROTHIAZIDE 12.5 MG CAPSULE 100 EACH BOTTLE: Performed by: NEUROLOGICAL SURGERY

## 2022-10-27 PROCEDURE — 82962 GLUCOSE BLOOD TEST: CPT

## 2022-10-27 PROCEDURE — 25010000002 DROPERIDOL PER 5 MG

## 2022-10-27 PROCEDURE — 76000 FLUOROSCOPY <1 HR PHYS/QHP: CPT

## 2022-10-27 PROCEDURE — 25010000002 PHENYLEPHRINE 10 MG/ML SOLUTION 1 ML VIAL: Performed by: NURSE ANESTHETIST, CERTIFIED REGISTERED

## 2022-10-27 PROCEDURE — 25010000002 MIDAZOLAM PER 1 MG: Performed by: ANESTHESIOLOGY

## 2022-10-27 PROCEDURE — 63710000001 GABAPENTIN 300 MG CAPSULE: Performed by: NEUROLOGICAL SURGERY

## 2022-10-27 PROCEDURE — 25010000002 HYDROMORPHONE 1 MG/ML SOLUTION

## 2022-10-27 PROCEDURE — 63281 BX/EXC IDRL SPINE LESN THRC: CPT | Performed by: NEUROLOGICAL SURGERY

## 2022-10-27 PROCEDURE — 25010000002 ALBUMIN HUMAN 5% PER 50 ML: Performed by: NURSE ANESTHETIST, CERTIFIED REGISTERED

## 2022-10-27 PROCEDURE — 25010000002 FENTANYL CITRATE (PF) 50 MCG/ML SOLUTION: Performed by: NURSE ANESTHETIST, CERTIFIED REGISTERED

## 2022-10-27 PROCEDURE — A9270 NON-COVERED ITEM OR SERVICE: HCPCS | Performed by: NEUROLOGICAL SURGERY

## 2022-10-27 PROCEDURE — 25010000002 PROPOFOL 10 MG/ML EMULSION: Performed by: NURSE ANESTHETIST, CERTIFIED REGISTERED

## 2022-10-27 PROCEDURE — 25010000002 DEXAMETHASONE PER 1 MG: Performed by: NURSE ANESTHETIST, CERTIFIED REGISTERED

## 2022-10-27 PROCEDURE — C1889 IMPLANT/INSERT DEVICE, NOC: HCPCS | Performed by: NEUROLOGICAL SURGERY

## 2022-10-27 PROCEDURE — 63710000001 FAMOTIDINE 20 MG TABLET: Performed by: NEUROLOGICAL SURGERY

## 2022-10-27 PROCEDURE — 25010000002 VANCOMYCIN 10 G RECONSTITUTED SOLUTION: Performed by: NEUROLOGICAL SURGERY

## 2022-10-27 PROCEDURE — 25010000002 SUCCINYLCHOLINE PER 20 MG: Performed by: NURSE ANESTHETIST, CERTIFIED REGISTERED

## 2022-10-27 PROCEDURE — 63710000001 LOSARTAN 50 MG TABLET 1 EACH BLISTER: Performed by: NEUROLOGICAL SURGERY

## 2022-10-27 PROCEDURE — 69990 MICROSURGERY ADD-ON: CPT | Performed by: NEUROLOGICAL SURGERY

## 2022-10-27 PROCEDURE — 25010000002 ATROPINE PER 0.01 MG: Performed by: NURSE ANESTHETIST, CERTIFIED REGISTERED

## 2022-10-27 PROCEDURE — 88307 TISSUE EXAM BY PATHOLOGIST: CPT | Performed by: NEUROLOGICAL SURGERY

## 2022-10-27 PROCEDURE — 00BT0ZZ EXCISION OF SPINAL MENINGES, OPEN APPROACH: ICD-10-PCS | Performed by: NEUROLOGICAL SURGERY

## 2022-10-27 PROCEDURE — 88331 PATH CONSLTJ SURG 1 BLK 1SPC: CPT | Performed by: PATHOLOGY

## 2022-10-27 PROCEDURE — 63281 BX/EXC IDRL SPINE LESN THRC: CPT | Performed by: PHYSICIAN ASSISTANT

## 2022-10-27 PROCEDURE — 25010000002 PHENYLEPHRINE 10 MG/ML SOLUTION: Performed by: NURSE ANESTHETIST, CERTIFIED REGISTERED

## 2022-10-27 DEVICE — DURAGEN® PLUS DURAL REGENERATION MATRIX, 2 IN X 2 IN (5 CM X 5 CM)
Type: IMPLANTABLE DEVICE | Site: SPINE THORACIC | Status: FUNCTIONAL
Brand: DURAGEN® PLUS

## 2022-10-27 DEVICE — DURAGEN® PLUS DURAL REGENERATION MATRIX, 1 IN X 3 IN (2.5 CM X 7.5 CM)
Type: IMPLANTABLE DEVICE | Site: SPINE THORACIC | Status: FUNCTIONAL
Brand: DURAGEN® PLUS

## 2022-10-27 DEVICE — HEMOST ABS SURGIFOAM SZ100 8X12 10MM: Type: IMPLANTABLE DEVICE | Site: SPINE THORACIC | Status: FUNCTIONAL

## 2022-10-27 DEVICE — FLOSEAL HEMOSTATIC MATRIX, 10ML
Type: IMPLANTABLE DEVICE | Site: SPINE THORACIC | Status: FUNCTIONAL
Brand: FLOSEAL HEMOSTATIC MATRIX

## 2022-10-27 RX ORDER — LIDOCAINE HYDROCHLORIDE 10 MG/ML
INJECTION, SOLUTION EPIDURAL; INFILTRATION; INTRACAUDAL; PERINEURAL AS NEEDED
Status: DISCONTINUED | OUTPATIENT
Start: 2022-10-27 | End: 2022-10-27 | Stop reason: SURG

## 2022-10-27 RX ORDER — PROPOFOL 10 MG/ML
VIAL (ML) INTRAVENOUS AS NEEDED
Status: DISCONTINUED | OUTPATIENT
Start: 2022-10-27 | End: 2022-10-27 | Stop reason: SURG

## 2022-10-27 RX ORDER — ATROPINE SULFATE 0.4 MG/ML
AMPUL (ML) INJECTION AS NEEDED
Status: DISCONTINUED | OUTPATIENT
Start: 2022-10-27 | End: 2022-10-27 | Stop reason: SURG

## 2022-10-27 RX ORDER — DIPHENHYDRAMINE HCL 25 MG
25 CAPSULE ORAL NIGHTLY PRN
Status: DISCONTINUED | OUTPATIENT
Start: 2022-10-27 | End: 2022-10-31 | Stop reason: HOSPADM

## 2022-10-27 RX ORDER — SODIUM CHLORIDE, SODIUM LACTATE, POTASSIUM CHLORIDE, CALCIUM CHLORIDE 600; 310; 30; 20 MG/100ML; MG/100ML; MG/100ML; MG/100ML
9 INJECTION, SOLUTION INTRAVENOUS CONTINUOUS
Status: DISCONTINUED | OUTPATIENT
Start: 2022-10-27 | End: 2022-10-31 | Stop reason: HOSPADM

## 2022-10-27 RX ORDER — GLYCOPYRROLATE 0.2 MG/ML
INJECTION INTRAMUSCULAR; INTRAVENOUS AS NEEDED
Status: DISCONTINUED | OUTPATIENT
Start: 2022-10-27 | End: 2022-10-27 | Stop reason: SURG

## 2022-10-27 RX ORDER — MIDAZOLAM HYDROCHLORIDE 1 MG/ML
0.5 INJECTION INTRAMUSCULAR; INTRAVENOUS
Status: DISCONTINUED | OUTPATIENT
Start: 2022-10-27 | End: 2022-10-27 | Stop reason: HOSPADM

## 2022-10-27 RX ORDER — ASPIRIN 81 MG/1
81 TABLET ORAL DAILY
Status: DISCONTINUED | OUTPATIENT
Start: 2022-10-27 | End: 2022-10-28

## 2022-10-27 RX ORDER — BUPIVACAINE HYDROCHLORIDE AND EPINEPHRINE 2.5; 5 MG/ML; UG/ML
INJECTION, SOLUTION EPIDURAL; INFILTRATION; INTRACAUDAL; PERINEURAL AS NEEDED
Status: DISCONTINUED | OUTPATIENT
Start: 2022-10-27 | End: 2022-10-27 | Stop reason: HOSPADM

## 2022-10-27 RX ORDER — PROCHLORPERAZINE EDISYLATE 5 MG/ML
10 INJECTION INTRAMUSCULAR; INTRAVENOUS ONCE AS NEEDED
Status: DISCONTINUED | OUTPATIENT
Start: 2022-10-27 | End: 2022-10-27 | Stop reason: HOSPADM

## 2022-10-27 RX ORDER — HYDROCODONE BITARTRATE AND ACETAMINOPHEN 7.5; 325 MG/1; MG/1
1 TABLET ORAL ONCE AS NEEDED
Status: DISCONTINUED | OUTPATIENT
Start: 2022-10-27 | End: 2022-10-27 | Stop reason: HOSPADM

## 2022-10-27 RX ORDER — SODIUM CHLORIDE, SODIUM LACTATE, POTASSIUM CHLORIDE, CALCIUM CHLORIDE 600; 310; 30; 20 MG/100ML; MG/100ML; MG/100ML; MG/100ML
90 INJECTION, SOLUTION INTRAVENOUS CONTINUOUS
Status: DISCONTINUED | OUTPATIENT
Start: 2022-10-27 | End: 2022-10-28

## 2022-10-27 RX ORDER — SODIUM CHLORIDE 0.9 % (FLUSH) 0.9 %
3 SYRINGE (ML) INJECTION EVERY 12 HOURS SCHEDULED
Status: DISCONTINUED | OUTPATIENT
Start: 2022-10-27 | End: 2022-10-31 | Stop reason: HOSPADM

## 2022-10-27 RX ORDER — BISACODYL 10 MG
10 SUPPOSITORY, RECTAL RECTAL DAILY PRN
Status: DISCONTINUED | OUTPATIENT
Start: 2022-10-27 | End: 2022-10-31 | Stop reason: HOSPADM

## 2022-10-27 RX ORDER — PHENYLEPHRINE HYDROCHLORIDE 10 MG/ML
INJECTION INTRAVENOUS AS NEEDED
Status: DISCONTINUED | OUTPATIENT
Start: 2022-10-27 | End: 2022-10-27 | Stop reason: SURG

## 2022-10-27 RX ORDER — FAMOTIDINE 20 MG/1
20 TABLET, FILM COATED ORAL
Status: COMPLETED | OUTPATIENT
Start: 2022-10-27 | End: 2022-10-27

## 2022-10-27 RX ORDER — SODIUM CHLORIDE 9 MG/ML
INJECTION, SOLUTION INTRAVENOUS AS NEEDED
Status: DISCONTINUED | OUTPATIENT
Start: 2022-10-27 | End: 2022-10-27 | Stop reason: HOSPADM

## 2022-10-27 RX ORDER — ACETAMINOPHEN 650 MG/1
650 SUPPOSITORY RECTAL ONCE AS NEEDED
Status: DISCONTINUED | OUTPATIENT
Start: 2022-10-27 | End: 2022-10-27 | Stop reason: HOSPADM

## 2022-10-27 RX ORDER — LIDOCAINE HYDROCHLORIDE 10 MG/ML
0.5 INJECTION, SOLUTION EPIDURAL; INFILTRATION; INTRACAUDAL; PERINEURAL ONCE AS NEEDED
Status: COMPLETED | OUTPATIENT
Start: 2022-10-27 | End: 2022-10-27

## 2022-10-27 RX ORDER — NALOXONE HCL 0.4 MG/ML
0.4 VIAL (ML) INJECTION
Status: DISCONTINUED | OUTPATIENT
Start: 2022-10-27 | End: 2022-10-31 | Stop reason: HOSPADM

## 2022-10-27 RX ORDER — HYDROCODONE BITARTRATE AND ACETAMINOPHEN 7.5; 325 MG/1; MG/1
2 TABLET ORAL EVERY 4 HOURS PRN
Status: DISCONTINUED | OUTPATIENT
Start: 2022-10-27 | End: 2022-10-31 | Stop reason: HOSPADM

## 2022-10-27 RX ORDER — ALBUMIN, HUMAN INJ 5% 5 %
SOLUTION INTRAVENOUS CONTINUOUS PRN
Status: DISCONTINUED | OUTPATIENT
Start: 2022-10-27 | End: 2022-10-27 | Stop reason: SURG

## 2022-10-27 RX ORDER — MORPHINE SULFATE 4 MG/ML
4 INJECTION, SOLUTION INTRAMUSCULAR; INTRAVENOUS EVERY 4 HOURS PRN
Status: DISCONTINUED | OUTPATIENT
Start: 2022-10-27 | End: 2022-10-31 | Stop reason: HOSPADM

## 2022-10-27 RX ORDER — DROPERIDOL 2.5 MG/ML
0.62 INJECTION, SOLUTION INTRAMUSCULAR; INTRAVENOUS ONCE AS NEEDED
Status: COMPLETED | OUTPATIENT
Start: 2022-10-27 | End: 2022-10-27

## 2022-10-27 RX ORDER — MAGNESIUM HYDROXIDE 1200 MG/15ML
LIQUID ORAL AS NEEDED
Status: DISCONTINUED | OUTPATIENT
Start: 2022-10-27 | End: 2022-10-27 | Stop reason: HOSPADM

## 2022-10-27 RX ORDER — DEXAMETHASONE SODIUM PHOSPHATE 4 MG/ML
INJECTION, SOLUTION INTRA-ARTICULAR; INTRALESIONAL; INTRAMUSCULAR; INTRAVENOUS; SOFT TISSUE AS NEEDED
Status: DISCONTINUED | OUTPATIENT
Start: 2022-10-27 | End: 2022-10-27 | Stop reason: SURG

## 2022-10-27 RX ORDER — SODIUM CHLORIDE 0.9 % (FLUSH) 0.9 %
10 SYRINGE (ML) INJECTION EVERY 12 HOURS SCHEDULED
Status: DISCONTINUED | OUTPATIENT
Start: 2022-10-27 | End: 2022-10-27 | Stop reason: HOSPADM

## 2022-10-27 RX ORDER — FENTANYL CITRATE 50 UG/ML
50 INJECTION, SOLUTION INTRAMUSCULAR; INTRAVENOUS
Status: DISCONTINUED | OUTPATIENT
Start: 2022-10-27 | End: 2022-10-27 | Stop reason: HOSPADM

## 2022-10-27 RX ORDER — SUCCINYLCHOLINE CHLORIDE 20 MG/ML
INJECTION INTRAMUSCULAR; INTRAVENOUS AS NEEDED
Status: DISCONTINUED | OUTPATIENT
Start: 2022-10-27 | End: 2022-10-27 | Stop reason: SURG

## 2022-10-27 RX ORDER — IPRATROPIUM BROMIDE AND ALBUTEROL SULFATE 2.5; .5 MG/3ML; MG/3ML
3 SOLUTION RESPIRATORY (INHALATION) ONCE AS NEEDED
Status: DISCONTINUED | OUTPATIENT
Start: 2022-10-27 | End: 2022-10-27 | Stop reason: HOSPADM

## 2022-10-27 RX ORDER — ACETAMINOPHEN 325 MG/1
650 TABLET ORAL ONCE AS NEEDED
Status: DISCONTINUED | OUTPATIENT
Start: 2022-10-27 | End: 2022-10-27 | Stop reason: HOSPADM

## 2022-10-27 RX ORDER — SODIUM CHLORIDE 0.9 % (FLUSH) 0.9 %
10 SYRINGE (ML) INJECTION AS NEEDED
Status: DISCONTINUED | OUTPATIENT
Start: 2022-10-27 | End: 2022-10-31 | Stop reason: HOSPADM

## 2022-10-27 RX ORDER — MORPHINE SULFATE 2 MG/ML
2 INJECTION, SOLUTION INTRAMUSCULAR; INTRAVENOUS EVERY 4 HOURS PRN
Status: DISCONTINUED | OUTPATIENT
Start: 2022-10-27 | End: 2022-10-31 | Stop reason: HOSPADM

## 2022-10-27 RX ORDER — SODIUM CHLORIDE 0.9 % (FLUSH) 0.9 %
10 SYRINGE (ML) INJECTION AS NEEDED
Status: DISCONTINUED | OUTPATIENT
Start: 2022-10-27 | End: 2022-10-27 | Stop reason: HOSPADM

## 2022-10-27 RX ORDER — DROPERIDOL 2.5 MG/ML
INJECTION, SOLUTION INTRAMUSCULAR; INTRAVENOUS
Status: COMPLETED
Start: 2022-10-27 | End: 2022-10-27

## 2022-10-27 RX ORDER — PROMETHAZINE HYDROCHLORIDE 12.5 MG/1
12.5 TABLET ORAL EVERY 6 HOURS PRN
Status: DISCONTINUED | OUTPATIENT
Start: 2022-10-27 | End: 2022-10-31 | Stop reason: HOSPADM

## 2022-10-27 RX ORDER — HYDROMORPHONE HYDROCHLORIDE 1 MG/ML
0.5 INJECTION, SOLUTION INTRAMUSCULAR; INTRAVENOUS; SUBCUTANEOUS
Status: DISCONTINUED | OUTPATIENT
Start: 2022-10-27 | End: 2022-10-27 | Stop reason: HOSPADM

## 2022-10-27 RX ORDER — AMOXICILLIN 250 MG
2 CAPSULE ORAL NIGHTLY PRN
Status: DISCONTINUED | OUTPATIENT
Start: 2022-10-27 | End: 2022-10-31 | Stop reason: HOSPADM

## 2022-10-27 RX ORDER — GABAPENTIN 300 MG/1
300 CAPSULE ORAL NIGHTLY
Status: DISCONTINUED | OUTPATIENT
Start: 2022-10-27 | End: 2022-10-31 | Stop reason: HOSPADM

## 2022-10-27 RX ORDER — ACETAMINOPHEN 325 MG/1
650 TABLET ORAL EVERY 4 HOURS PRN
Status: DISCONTINUED | OUTPATIENT
Start: 2022-10-27 | End: 2022-10-31 | Stop reason: HOSPADM

## 2022-10-27 RX ORDER — PROMETHAZINE HYDROCHLORIDE 12.5 MG/1
12.5 SUPPOSITORY RECTAL EVERY 6 HOURS PRN
Status: DISCONTINUED | OUTPATIENT
Start: 2022-10-27 | End: 2022-10-31 | Stop reason: HOSPADM

## 2022-10-27 RX ORDER — FENTANYL CITRATE 50 UG/ML
INJECTION, SOLUTION INTRAMUSCULAR; INTRAVENOUS AS NEEDED
Status: DISCONTINUED | OUTPATIENT
Start: 2022-10-27 | End: 2022-10-27 | Stop reason: SURG

## 2022-10-27 RX ADMIN — VANCOMYCIN HYDROCHLORIDE 1250 MG: 10 INJECTION, POWDER, LYOPHILIZED, FOR SOLUTION INTRAVENOUS at 21:07

## 2022-10-27 RX ADMIN — FENTANYL CITRATE 25 MCG: 50 INJECTION, SOLUTION INTRAMUSCULAR; INTRAVENOUS at 13:41

## 2022-10-27 RX ADMIN — REMIFENTANIL HYDROCHLORIDE 0.1 MCG/KG/MIN: 1 INJECTION, POWDER, LYOPHILIZED, FOR SOLUTION INTRAVENOUS at 10:43

## 2022-10-27 RX ADMIN — SODIUM CHLORIDE, POTASSIUM CHLORIDE, SODIUM LACTATE AND CALCIUM CHLORIDE 90 ML/HR: 600; 310; 30; 20 INJECTION, SOLUTION INTRAVENOUS at 16:58

## 2022-10-27 RX ADMIN — LIDOCAINE HYDROCHLORIDE 50 MG: 10 INJECTION, SOLUTION EPIDURAL; INFILTRATION; INTRACAUDAL; PERINEURAL at 10:43

## 2022-10-27 RX ADMIN — DROPERIDOL 0.62 MG: 2.5 INJECTION, SOLUTION INTRAMUSCULAR; INTRAVENOUS at 15:07

## 2022-10-27 RX ADMIN — GABAPENTIN 300 MG: 300 CAPSULE ORAL at 21:07

## 2022-10-27 RX ADMIN — ALBUMIN HUMAN: 0.05 INJECTION, SOLUTION INTRAVENOUS at 12:05

## 2022-10-27 RX ADMIN — PROPOFOL 200 MG: 10 INJECTION, EMULSION INTRAVENOUS at 10:43

## 2022-10-27 RX ADMIN — SODIUM CHLORIDE, POTASSIUM CHLORIDE, SODIUM LACTATE AND CALCIUM CHLORIDE 9 ML/HR: 600; 310; 30; 20 INJECTION, SOLUTION INTRAVENOUS at 09:07

## 2022-10-27 RX ADMIN — ATROPINE SULFATE 0.2 MG: 0.4 INJECTION, SOLUTION INTRAMUSCULAR; INTRAVENOUS; SUBCUTANEOUS at 10:57

## 2022-10-27 RX ADMIN — SUCCINYLCHOLINE CHLORIDE 180 MG: 20 INJECTION, SOLUTION INTRAMUSCULAR; INTRAVENOUS at 10:43

## 2022-10-27 RX ADMIN — MIDAZOLAM HYDROCHLORIDE 2 MG: 1 INJECTION, SOLUTION INTRAMUSCULAR; INTRAVENOUS at 10:38

## 2022-10-27 RX ADMIN — LIDOCAINE HYDROCHLORIDE 0.5 ML: 10 INJECTION, SOLUTION EPIDURAL; INFILTRATION; INTRACAUDAL; PERINEURAL at 09:07

## 2022-10-27 RX ADMIN — FENTANYL CITRATE 25 MCG: 50 INJECTION, SOLUTION INTRAMUSCULAR; INTRAVENOUS at 13:45

## 2022-10-27 RX ADMIN — PHENYLEPHRINE HYDROCHLORIDE 200 MCG: 10 INJECTION INTRAVENOUS at 10:51

## 2022-10-27 RX ADMIN — VANCOMYCIN HYDROCHLORIDE 1250 MG: 10 INJECTION, POWDER, LYOPHILIZED, FOR SOLUTION INTRAVENOUS at 09:51

## 2022-10-27 RX ADMIN — Medication 3 ML: at 17:10

## 2022-10-27 RX ADMIN — FENTANYL CITRATE 25 MCG: 50 INJECTION, SOLUTION INTRAMUSCULAR; INTRAVENOUS at 13:30

## 2022-10-27 RX ADMIN — ALBUMIN HUMAN: 0.05 INJECTION, SOLUTION INTRAVENOUS at 11:54

## 2022-10-27 RX ADMIN — HYDROCODONE BITARTRATE AND ACETAMINOPHEN 2 TABLET: 7.5; 325 TABLET ORAL at 18:46

## 2022-10-27 RX ADMIN — LOSARTAN POTASSIUM: 50 TABLET, FILM COATED ORAL at 17:14

## 2022-10-27 RX ADMIN — SODIUM CHLORIDE, POTASSIUM CHLORIDE, SODIUM LACTATE AND CALCIUM CHLORIDE: 600; 310; 30; 20 INJECTION, SOLUTION INTRAVENOUS at 12:46

## 2022-10-27 RX ADMIN — PHENYLEPHRINE HYDROCHLORIDE 200 MCG: 10 INJECTION INTRAVENOUS at 11:12

## 2022-10-27 RX ADMIN — FAMOTIDINE 20 MG: 20 TABLET ORAL at 09:07

## 2022-10-27 RX ADMIN — FENTANYL CITRATE 25 MCG: 50 INJECTION, SOLUTION INTRAMUSCULAR; INTRAVENOUS at 13:35

## 2022-10-27 RX ADMIN — HYDROMORPHONE HYDROCHLORIDE 0.5 MG: 1 INJECTION, SOLUTION INTRAMUSCULAR; INTRAVENOUS; SUBCUTANEOUS at 14:25

## 2022-10-27 RX ADMIN — HYDROCODONE BITARTRATE AND ACETAMINOPHEN 2 TABLET: 7.5; 325 TABLET ORAL at 23:21

## 2022-10-27 RX ADMIN — PROPOFOL 200 MCG/KG/MIN: 10 INJECTION, EMULSION INTRAVENOUS at 10:43

## 2022-10-27 RX ADMIN — HYDROMORPHONE HYDROCHLORIDE 0.5 MG: 1 INJECTION, SOLUTION INTRAMUSCULAR; INTRAVENOUS; SUBCUTANEOUS at 14:20

## 2022-10-27 RX ADMIN — DEXAMETHASONE SODIUM PHOSPHATE 8 MG: 4 INJECTION, SOLUTION INTRA-ARTICULAR; INTRALESIONAL; INTRAMUSCULAR; INTRAVENOUS; SOFT TISSUE at 11:11

## 2022-10-27 RX ADMIN — PHENYLEPHRINE HYDROCHLORIDE 0.1 MCG/MIN: 10 INJECTION INTRAVENOUS at 11:25

## 2022-10-27 RX ADMIN — SODIUM CHLORIDE, POTASSIUM CHLORIDE, SODIUM LACTATE AND CALCIUM CHLORIDE 90 ML/HR: 600; 310; 30; 20 INJECTION, SOLUTION INTRAVENOUS at 23:39

## 2022-10-27 RX ADMIN — Medication 3 ML: at 21:09

## 2022-10-27 RX ADMIN — GLYCOPYRROLATE 0.8 MG: 0.2 INJECTION INTRAMUSCULAR; INTRAVENOUS at 10:56

## 2022-10-27 NOTE — ANESTHESIA PROCEDURE NOTES
Airway  Urgency: elective    Date/Time: 10/27/2022 10:43 AM  End Time:10/27/2022 10:44 AM  Airway not difficult    General Information and Staff    Patient location during procedure: OR  CRNA/CAA: Senthil Hair CRNA    Indications and Patient Condition  Indications for airway management: airway protection    Preoxygenated: yes  MILS not maintained throughout  Mask difficulty assessment: 1 - vent by mask    Final Airway Details  Final airway type: endotracheal airway      Successful airway: ETT  Cuffed: yes   Successful intubation technique: direct laryngoscopy  Endotracheal tube insertion site: oral  Blade: Nicolás  Blade size: 3  ETT size (mm): 7.0  Cormack-Lehane Classification: grade I - full view of glottis  Placement verified by: chest auscultation and capnometry   Measured from: lips  ETT/EBT  to lips (cm): 21  Number of attempts at approach: 1  Assessment: lips, teeth, and gum same as pre-op and atraumatic intubation    Additional Comments  Negative epigastric sounds, Breath sound equal bilaterally with symmetric chest rise and fall

## 2022-10-27 NOTE — ANESTHESIA POSTPROCEDURE EVALUATION
Patient: Bharti Haines    Procedure Summary     Date: 10/27/22 Room / Location:  [facility]  /  [facility]    Anesthesia Start: 1038 Anesthesia Stop: 1404    Procedure: THORACIC LAMINECTOMY DECOMPRESSIVE POSTERIOR for tumor T7   (Spine Thoracic) Diagnosis:       Spinal meningioma (HCC)      (Spinal meningioma (HCC) [D32.1])    Surgeons: Hipolito Montejo MD Provider: Frank Singh MD    Anesthesia Type: general ASA Status: 3          Anesthesia Type: general    Vitals  Vitals Value Taken Time   /61 10/27/22 1400   Temp 97.5 °F (36.4 °C) 10/27/22 1400   Pulse 86 10/27/22 1400   Resp 16 10/27/22 1400   SpO2 97 % 10/27/22 1400           Post Anesthesia Care and Evaluation    Patient location during evaluation: PACU  Patient participation: complete - patient participated  Level of consciousness: awake and alert  Pain management: adequate    Airway patency: patent  Anesthetic complications: No anesthetic complications  PONV Status: none  Cardiovascular status: hemodynamically stable and acceptable  Respiratory status: nonlabored ventilation, acceptable and nasal cannula  Hydration status: acceptable

## 2022-10-27 NOTE — ANESTHESIA PREPROCEDURE EVALUATION
Anesthesia Evaluation     Patient summary reviewed and Nursing notes reviewed   history of anesthetic complications: PONV  NPO Solid Status: > 8 hours  NPO Liquid Status: > 2 hours           Airway   Mallampati: II  TM distance: >3 FB  Neck ROM: full  No difficulty expected  Dental    (+) upper dentures    Pulmonary - negative pulmonary ROS and normal exam    breath sounds clear to auscultation  Cardiovascular - normal exam    ECG reviewed  Rhythm: regular  Rate: normal    (+) hypertension,       Neuro/Psych  (+) numbness,      ROS Comment: Spinal meningioma  GI/Hepatic/Renal/Endo    (+) morbid obesity,      Musculoskeletal     Abdominal    Substance History      OB/GYN          Other   arthritis,    history of cancer (Breast ca s/p lumpectomy)                    Anesthesia Plan    ASA 3     general     (Subhypnotic propofol infusion for PONV prophylaxis)  intravenous induction     Anesthetic plan, risks, benefits, and alternatives have been provided, discussed and informed consent has been obtained with: patient.    Plan discussed with CRNA.        CODE STATUS:

## 2022-10-28 LAB
ANION GAP SERPL CALCULATED.3IONS-SCNC: 12 MMOL/L (ref 5–15)
BUN SERPL-MCNC: 11 MG/DL (ref 8–23)
BUN/CREAT SERPL: 14.5 (ref 7–25)
CALCIUM SPEC-SCNC: 8.2 MG/DL (ref 8.6–10.5)
CHLORIDE SERPL-SCNC: 97 MMOL/L (ref 98–107)
CO2 SERPL-SCNC: 21 MMOL/L (ref 22–29)
CREAT SERPL-MCNC: 0.76 MG/DL (ref 0.57–1)
EGFRCR SERPLBLD CKD-EPI 2021: 82.9 ML/MIN/1.73
GLUCOSE SERPL-MCNC: 138 MG/DL (ref 65–99)
HCT VFR BLD AUTO: 32 % (ref 34–46.6)
HGB BLD-MCNC: 11.2 G/DL (ref 12–15.9)
POTASSIUM SERPL-SCNC: 3.9 MMOL/L (ref 3.5–5.2)
SODIUM SERPL-SCNC: 130 MMOL/L (ref 136–145)

## 2022-10-28 PROCEDURE — 99024 POSTOP FOLLOW-UP VISIT: CPT | Performed by: NEUROLOGICAL SURGERY

## 2022-10-28 PROCEDURE — 25010000002 HEPARIN (PORCINE) PER 1000 UNITS: Performed by: NEUROLOGICAL SURGERY

## 2022-10-28 PROCEDURE — A9270 NON-COVERED ITEM OR SERVICE: HCPCS | Performed by: NEUROLOGICAL SURGERY

## 2022-10-28 PROCEDURE — 25010000002 VANCOMYCIN 10 G RECONSTITUTED SOLUTION: Performed by: NEUROLOGICAL SURGERY

## 2022-10-28 PROCEDURE — 63710000001 HYDROCHLOROTHIAZIDE 12.5 MG CAPSULE 100 EACH BOTTLE: Performed by: NEUROLOGICAL SURGERY

## 2022-10-28 PROCEDURE — 85018 HEMOGLOBIN: CPT | Performed by: NEUROLOGICAL SURGERY

## 2022-10-28 PROCEDURE — 63710000001 ASPIRIN 81 MG TABLET DELAYED-RELEASE: Performed by: NEUROLOGICAL SURGERY

## 2022-10-28 PROCEDURE — 63710000001 HYDROCODONE-ACETAMINOPHEN 7.5-325 MG TABLET: Performed by: NEUROLOGICAL SURGERY

## 2022-10-28 PROCEDURE — 63710000001 PROMETHAZINE PER 12.5 MG: Performed by: NEUROLOGICAL SURGERY

## 2022-10-28 PROCEDURE — 63710000001 LOSARTAN 50 MG TABLET 1 EACH BLISTER: Performed by: NEUROLOGICAL SURGERY

## 2022-10-28 PROCEDURE — 85014 HEMATOCRIT: CPT | Performed by: NEUROLOGICAL SURGERY

## 2022-10-28 PROCEDURE — 63710000001 GABAPENTIN 300 MG CAPSULE: Performed by: NEUROLOGICAL SURGERY

## 2022-10-28 PROCEDURE — 80048 BASIC METABOLIC PNL TOTAL CA: CPT | Performed by: NEUROLOGICAL SURGERY

## 2022-10-28 RX ORDER — ASPIRIN 81 MG/1
81 TABLET ORAL DAILY
Status: DISCONTINUED | OUTPATIENT
Start: 2022-10-28 | End: 2022-10-31 | Stop reason: HOSPADM

## 2022-10-28 RX ORDER — HEPARIN SODIUM 5000 [USP'U]/ML
5000 INJECTION, SOLUTION INTRAVENOUS; SUBCUTANEOUS EVERY 8 HOURS SCHEDULED
Status: DISCONTINUED | OUTPATIENT
Start: 2022-10-28 | End: 2022-10-31 | Stop reason: HOSPADM

## 2022-10-28 RX ADMIN — GABAPENTIN 300 MG: 300 CAPSULE ORAL at 20:35

## 2022-10-28 RX ADMIN — LOSARTAN POTASSIUM: 50 TABLET, FILM COATED ORAL at 08:35

## 2022-10-28 RX ADMIN — HEPARIN SODIUM 5000 UNITS: 5000 INJECTION INTRAVENOUS; SUBCUTANEOUS at 21:04

## 2022-10-28 RX ADMIN — HEPARIN SODIUM 5000 UNITS: 5000 INJECTION INTRAVENOUS; SUBCUTANEOUS at 08:35

## 2022-10-28 RX ADMIN — HYDROCODONE BITARTRATE AND ACETAMINOPHEN 2 TABLET: 7.5; 325 TABLET ORAL at 04:08

## 2022-10-28 RX ADMIN — Medication 3 ML: at 08:36

## 2022-10-28 RX ADMIN — HEPARIN SODIUM 5000 UNITS: 5000 INJECTION INTRAVENOUS; SUBCUTANEOUS at 13:23

## 2022-10-28 RX ADMIN — ASPIRIN 81 MG: 81 TABLET, COATED ORAL at 10:56

## 2022-10-28 RX ADMIN — HYDROCODONE BITARTRATE AND ACETAMINOPHEN 2 TABLET: 7.5; 325 TABLET ORAL at 20:35

## 2022-10-28 RX ADMIN — VANCOMYCIN HYDROCHLORIDE 1250 MG: 10 INJECTION, POWDER, LYOPHILIZED, FOR SOLUTION INTRAVENOUS at 09:12

## 2022-10-28 RX ADMIN — Medication 3 ML: at 20:36

## 2022-10-28 RX ADMIN — PROMETHAZINE HYDROCHLORIDE 12.5 MG: 12.5 TABLET ORAL at 09:14

## 2022-10-29 PROCEDURE — A9270 NON-COVERED ITEM OR SERVICE: HCPCS | Performed by: NEUROLOGICAL SURGERY

## 2022-10-29 PROCEDURE — 63710000001 MAGNESIUM HYDROXIDE 2400 MG/10ML SUSPENSION: Performed by: NEUROLOGICAL SURGERY

## 2022-10-29 PROCEDURE — 63710000001 ASPIRIN 81 MG TABLET DELAYED-RELEASE: Performed by: NEUROLOGICAL SURGERY

## 2022-10-29 PROCEDURE — 99024 POSTOP FOLLOW-UP VISIT: CPT | Performed by: NEUROLOGICAL SURGERY

## 2022-10-29 PROCEDURE — 63710000001 HYDROCHLOROTHIAZIDE 12.5 MG CAPSULE 100 EACH BOTTLE: Performed by: NEUROLOGICAL SURGERY

## 2022-10-29 PROCEDURE — 63710000001 LOSARTAN 50 MG TABLET 1 EACH BLISTER: Performed by: NEUROLOGICAL SURGERY

## 2022-10-29 PROCEDURE — 25010000002 HEPARIN (PORCINE) PER 1000 UNITS: Performed by: NEUROLOGICAL SURGERY

## 2022-10-29 PROCEDURE — 63710000001 PROMETHAZINE PER 12.5 MG: Performed by: NEUROLOGICAL SURGERY

## 2022-10-29 PROCEDURE — 63710000001 HYDROCODONE-ACETAMINOPHEN 7.5-325 MG TABLET: Performed by: NEUROLOGICAL SURGERY

## 2022-10-29 PROCEDURE — 63710000001 GABAPENTIN 300 MG CAPSULE: Performed by: NEUROLOGICAL SURGERY

## 2022-10-29 PROCEDURE — 63710000001 ACETAMINOPHEN 325 MG TABLET: Performed by: NEUROLOGICAL SURGERY

## 2022-10-29 RX ADMIN — HYDROCODONE BITARTRATE AND ACETAMINOPHEN 2 TABLET: 7.5; 325 TABLET ORAL at 00:33

## 2022-10-29 RX ADMIN — HEPARIN SODIUM 5000 UNITS: 5000 INJECTION INTRAVENOUS; SUBCUTANEOUS at 06:33

## 2022-10-29 RX ADMIN — GABAPENTIN 300 MG: 300 CAPSULE ORAL at 21:07

## 2022-10-29 RX ADMIN — HEPARIN SODIUM 5000 UNITS: 5000 INJECTION INTRAVENOUS; SUBCUTANEOUS at 14:38

## 2022-10-29 RX ADMIN — ASPIRIN 81 MG: 81 TABLET, COATED ORAL at 08:37

## 2022-10-29 RX ADMIN — LOSARTAN POTASSIUM: 50 TABLET, FILM COATED ORAL at 08:37

## 2022-10-29 RX ADMIN — Medication 3 ML: at 21:07

## 2022-10-29 RX ADMIN — HYDROCODONE BITARTRATE AND ACETAMINOPHEN 2 TABLET: 7.5; 325 TABLET ORAL at 21:07

## 2022-10-29 RX ADMIN — Medication 3 ML: at 08:39

## 2022-10-29 RX ADMIN — MAGNESIUM HYDROXIDE 10 ML: 2400 SUSPENSION ORAL at 19:03

## 2022-10-29 RX ADMIN — HEPARIN SODIUM 5000 UNITS: 5000 INJECTION INTRAVENOUS; SUBCUTANEOUS at 21:07

## 2022-10-29 RX ADMIN — ACETAMINOPHEN 650 MG: 325 TABLET, FILM COATED ORAL at 09:40

## 2022-10-29 RX ADMIN — PROMETHAZINE HYDROCHLORIDE 12.5 MG: 12.5 TABLET ORAL at 15:00

## 2022-10-30 PROCEDURE — A9270 NON-COVERED ITEM OR SERVICE: HCPCS | Performed by: NEUROLOGICAL SURGERY

## 2022-10-30 PROCEDURE — 25010000002 HEPARIN (PORCINE) PER 1000 UNITS: Performed by: NEUROLOGICAL SURGERY

## 2022-10-30 PROCEDURE — 97116 GAIT TRAINING THERAPY: CPT

## 2022-10-30 PROCEDURE — 63710000001 LOSARTAN 50 MG TABLET 1 EACH BLISTER: Performed by: NEUROLOGICAL SURGERY

## 2022-10-30 PROCEDURE — 63710000001 ASPIRIN 81 MG TABLET DELAYED-RELEASE: Performed by: NEUROLOGICAL SURGERY

## 2022-10-30 PROCEDURE — 63710000001 HYDROCHLOROTHIAZIDE 12.5 MG CAPSULE 100 EACH BOTTLE: Performed by: NEUROLOGICAL SURGERY

## 2022-10-30 PROCEDURE — 97110 THERAPEUTIC EXERCISES: CPT

## 2022-10-30 PROCEDURE — 99024 POSTOP FOLLOW-UP VISIT: CPT | Performed by: NEUROLOGICAL SURGERY

## 2022-10-30 PROCEDURE — 97162 PT EVAL MOD COMPLEX 30 MIN: CPT

## 2022-10-30 PROCEDURE — 63710000001 ACETAMINOPHEN 325 MG TABLET: Performed by: NEUROLOGICAL SURGERY

## 2022-10-30 RX ADMIN — ACETAMINOPHEN 650 MG: 325 TABLET, FILM COATED ORAL at 16:04

## 2022-10-30 RX ADMIN — HYDROCODONE BITARTRATE AND ACETAMINOPHEN 2 TABLET: 7.5; 325 TABLET ORAL at 21:15

## 2022-10-30 RX ADMIN — ASPIRIN 81 MG: 81 TABLET, COATED ORAL at 08:24

## 2022-10-30 RX ADMIN — Medication 3 ML: at 08:26

## 2022-10-30 RX ADMIN — GABAPENTIN 300 MG: 300 CAPSULE ORAL at 21:12

## 2022-10-30 RX ADMIN — HEPARIN SODIUM 5000 UNITS: 5000 INJECTION INTRAVENOUS; SUBCUTANEOUS at 07:44

## 2022-10-30 RX ADMIN — HEPARIN SODIUM 5000 UNITS: 5000 INJECTION INTRAVENOUS; SUBCUTANEOUS at 14:40

## 2022-10-30 RX ADMIN — ACETAMINOPHEN 650 MG: 325 TABLET, FILM COATED ORAL at 09:53

## 2022-10-30 RX ADMIN — LOSARTAN POTASSIUM: 50 TABLET, FILM COATED ORAL at 08:24

## 2022-10-30 RX ADMIN — HEPARIN SODIUM 5000 UNITS: 5000 INJECTION INTRAVENOUS; SUBCUTANEOUS at 21:13

## 2022-10-31 ENCOUNTER — READMISSION MANAGEMENT (OUTPATIENT)
Dept: CALL CENTER | Facility: HOSPITAL | Age: 72
End: 2022-10-31

## 2022-10-31 ENCOUNTER — HOME HEALTH ADMISSION (OUTPATIENT)
Dept: HOME HEALTH SERVICES | Facility: HOME HEALTHCARE | Age: 72
End: 2022-10-31

## 2022-10-31 ENCOUNTER — TELEPHONE (OUTPATIENT)
Dept: INTERNAL MEDICINE | Facility: CLINIC | Age: 72
End: 2022-10-31

## 2022-10-31 VITALS
SYSTOLIC BLOOD PRESSURE: 160 MMHG | WEIGHT: 193 LBS | TEMPERATURE: 97.8 F | HEART RATE: 66 BPM | BODY MASS INDEX: 38.91 KG/M2 | OXYGEN SATURATION: 98 % | HEIGHT: 59 IN | RESPIRATION RATE: 15 BRPM | DIASTOLIC BLOOD PRESSURE: 80 MMHG

## 2022-10-31 DIAGNOSIS — R11.0 NAUSEA: ICD-10-CM

## 2022-10-31 LAB
CYTO UR: NORMAL
LAB AP CASE REPORT: NORMAL
LAB AP CLINICAL INFORMATION: NORMAL
Lab: NORMAL
PATH REPORT.FINAL DX SPEC: NORMAL
PATH REPORT.GROSS SPEC: NORMAL

## 2022-10-31 PROCEDURE — 25010000002 HEPARIN (PORCINE) PER 1000 UNITS: Performed by: NEUROLOGICAL SURGERY

## 2022-10-31 PROCEDURE — 99024 POSTOP FOLLOW-UP VISIT: CPT | Performed by: NEUROLOGICAL SURGERY

## 2022-10-31 PROCEDURE — 97116 GAIT TRAINING THERAPY: CPT

## 2022-10-31 PROCEDURE — 97530 THERAPEUTIC ACTIVITIES: CPT

## 2022-10-31 RX ORDER — PROMETHAZINE HYDROCHLORIDE 12.5 MG/1
12.5 TABLET ORAL EVERY 8 HOURS PRN
Qty: 20 TABLET | Refills: 0 | OUTPATIENT
Start: 2022-10-31

## 2022-10-31 RX ORDER — PROMETHAZINE HYDROCHLORIDE 12.5 MG/1
12.5 TABLET ORAL EVERY 6 HOURS PRN
Qty: 20 TABLET | Refills: 0 | Status: SHIPPED | OUTPATIENT
Start: 2022-10-31 | End: 2022-11-01 | Stop reason: SDUPTHER

## 2022-10-31 RX ORDER — HYDROCODONE BITARTRATE AND ACETAMINOPHEN 7.5; 325 MG/1; MG/1
1 TABLET ORAL 3 TIMES DAILY PRN
Qty: 20 TABLET | Refills: 0 | Status: SHIPPED | OUTPATIENT
Start: 2022-10-31 | End: 2023-03-01

## 2022-10-31 RX ADMIN — ACETAMINOPHEN 650 MG: 325 TABLET, FILM COATED ORAL at 08:29

## 2022-10-31 RX ADMIN — LOSARTAN POTASSIUM: 50 TABLET, FILM COATED ORAL at 08:27

## 2022-10-31 RX ADMIN — HEPARIN SODIUM 5000 UNITS: 5000 INJECTION INTRAVENOUS; SUBCUTANEOUS at 05:11

## 2022-10-31 RX ADMIN — Medication 3 ML: at 08:27

## 2022-10-31 RX ADMIN — ASPIRIN 81 MG: 81 TABLET, COATED ORAL at 08:27

## 2022-10-31 RX ADMIN — HYDROCODONE BITARTRATE AND ACETAMINOPHEN 2 TABLET: 7.5; 325 TABLET ORAL at 03:10

## 2022-10-31 NOTE — TELEPHONE ENCOUNTER
Provider:  JEROME  Caller: PATIENT  Time of call:  12:47 PM  Phone #:  [phone]  Surgery:  THORACIC LAMINECTOMY DECOMPRESSIVE POSTERIOR FOR TUMOR T7    Surgery Date:  10/27/22  Last visit:   10/19/22  Next visit: 11/4/22 W/ GERMAN SANCHEZ:          Reason for call: PATIENT LEFT  STATING THAT SHE JUST GOT HOME FROM THE HOSPITAL AND CALLING TO SPEAK TO TRINA FOR A PHENERGAN RX.  PATIENT STATES SHE WILL NEED THIS BEFORE SHE GOES TO BED TONIGHT.    PHARMACY:     [facility] [id number] - [place], [place] - [address] - [phone]  - [phone] FX   [address] [facility] [address]   Phone: [phone] Fax: [phone]   Hours: Not open 24 hours     PLEASE CALL PATIENT BACK ONCE RX HAS BEEN CALLED IN.

## 2022-11-01 ENCOUNTER — HOME CARE VISIT (OUTPATIENT)
Dept: HOME HEALTH SERVICES | Facility: HOME HEALTHCARE | Age: 72
End: 2022-11-01

## 2022-11-01 ENCOUNTER — TRANSITIONAL CARE MANAGEMENT TELEPHONE ENCOUNTER (OUTPATIENT)
Dept: CALL CENTER | Facility: HOSPITAL | Age: 72
End: 2022-11-01

## 2022-11-01 VITALS
DIASTOLIC BLOOD PRESSURE: 66 MMHG | OXYGEN SATURATION: 95 % | TEMPERATURE: 98.1 F | SYSTOLIC BLOOD PRESSURE: 135 MMHG | RESPIRATION RATE: 17 BRPM | HEART RATE: 70 BPM

## 2022-11-01 PROCEDURE — G0151 HHCP-SERV OF PT,EA 15 MIN: HCPCS

## 2022-11-01 RX ORDER — PROMETHAZINE HYDROCHLORIDE 12.5 MG/1
12.5 TABLET ORAL EVERY 6 HOURS PRN
Qty: 20 TABLET | Refills: 0 | Status: SHIPPED | OUTPATIENT
Start: 2022-11-01 | End: 2022-11-08

## 2022-11-01 NOTE — OUTREACH NOTE
Call Center TCM Note    Flowsheet Row Responses   [facility] patient discharged from? [place]   Does the patient have one of the following disease processes/diagnoses(primary or secondary)? General Surgery   TCM attempt successful? Yes   Call start time 1018   Call end time 1026   Discharge diagnosis T6 & T7 laminectomies for resection intradural extramedullary thoracic tumor   Meds reviewed with patient/caregiver? Yes   Is the patient having any side effects they believe may be caused by any medication additions or changes? No   Does the patient have all medications related to this admission filled (includes all antibiotics, pain medications, etc.) Yes   Is the patient taking all medications as directed (includes completed medication regime)? Yes   Medication comments States she had to call and get an order for phenergan   Comments Pt wants to keep currently appt on 11/15 with Dr. Guerrero.   Does the patient have an appointment with their PCP within 7 days of discharge? No   Nursing Interventions Patient declined scheduling/rescheduling appointment at this time   What is the Home health agency?  [facility]   Has home health visited the patient within 72 hours of discharge? Yes   Home health comments They are on their way over and should be there in 10 min   Psychosocial issues? No   Did the patient receive a copy of their discharge instructions? Yes   Nursing interventions Reviewed instructions with patient   What is the patient's perception of their health status since discharge? Improving   Nursing interventions Nurse provided patient education   Is the patient /caregiver able to teach back basic post-op care? Practice 'cough and deep breath', Drive as instructed by MD in discharge instructions, No tub bath, swimming, or hot tub until instructed by MD, Keep incision areas clean,dry and protected, Lifting as instructed by MD in discharge instructions   Is the patient/caregiver able to teach back signs and  "symptoms of incisional infection? Increased redness, swelling or pain at the incisonal site, Increased drainage or bleeding, Incisional warmth, Pus or odor from incision, Fever   Is the patient/caregiver able to teach back steps to recovery at home? Rest and rebuild strength, gradually increase activity, Set small, achievable goals for return to baseline health, Eat a well-balance diet, Make a list of questions for surgeon's appointment   If the patient is a current smoker, are they able to teach back resources for cessation? Not a smoker   Is the patient/caregiver able to teach back the hierarchy of who to call/visit for symptoms/problems? PCP, Specialist, Home health nurse, Urgent Care, ED, 911 Yes   Additional teach back comments States she is doing \"ok\".  She believes she knows what to look for since she has had several surgeries.     TCM call completed? Yes   Wrap up additional comments Pt will call Nurse Call Center if she should have any questions or concerns   Call end time 1026          Rafaela Baird LPN    11/1/2022, 10:33 EDT      "

## 2022-11-01 NOTE — HOME HEALTH
"Patient has used a RW off and on as a result of bilateral hip and knees.  Goal is to return to usage of st cane.  Was drving 6 days prior to surgery.  Wears glasses, currently amb with RW.  Lives with spouse in 1 story home but does have a basement though she does not use the basement.  She denies any falls within the last year. Patient reports numbness throughout bilateral LE that began yesterday.  She states she was told that would happen and \"will take a while for the nerves to grow back\". She states she has been sleeping in a recliner versus a bed x the past 2 years.       Home Health ordered for: disciplines PT for strengthening of core mm and bilateral LE.    Reason for Hosp/Primary Dx/Co-morbidities: Patient s/p mid thoracic/lumbar surgical intervention.    Focus of Care: PT to focus on safety with mobility performance, ongoing core mm strengthening and bilateral LE strengthening.     Current Functional status/mobility/DME: safe usage of RW and prn usage of st cane    HB status/Living Arrangements: Patient lives with spouse in 1 story home plus basement.     Skin Integrity/wound status: WFL    Code Status: full code    Fall Risk: ongoing falls risk"

## 2022-11-01 NOTE — TELEPHONE ENCOUNTER
I have sent in a prescription for this.  If she has ongoing symptoms, please come in for evaluation.  If symptoms worsen, don't improve, develops a fever needs to be seen.

## 2022-11-02 ENCOUNTER — TELEPHONE (OUTPATIENT)
Dept: INTERNAL MEDICINE | Facility: CLINIC | Age: 72
End: 2022-11-02

## 2022-11-02 NOTE — TELEPHONE ENCOUNTER
Spoke with patient, informed that Dr Guerrero sent in rx for Phenergan.  Verbalized understanding but states she already got the rx filled from Dr Montejo.

## 2022-11-02 NOTE — TELEPHONE ENCOUNTER
Patient was waiting to schedule her DUPLEX VENOUS LOWER EXTREMITY BILATERAL CAR that was ordered on 8/5/2022 by Jailyn until she got her MRI results. She has been given the results-does she still need to have the duplex done or is it ok to cancel the order?

## 2022-11-02 NOTE — DISCHARGE SUMMARY
[facility]    Date of Admission: 10/27/2022  Date of Discharge:  11/2/2022    Discharge Diagnosis: extradural extramedullary thoracic tumor    Procedures Performed  Procedure(s):  THORACIC LAMINECTOMY DECOMPRESSIVE POSTERIOR FOR TUMOR T7       History of Present Illness:  Ms Haines is a 72 y.o. female who presented with  progressive abdominal and lower extremity numbness as well as lower extremity weakness.  Studies demonstrated a lesion in the thoracic spine felt to be an intradural extramedullary lesion consistent with meningioma.  There is sebastián cord compression.  As such she presented on 10/27/22 for surgical resection of the lesion     Hospital Course:   On 10/27/2022, Ms. Haines underwent T6 and 7 laminectomy for resection of her intradural tumor.  Intraoperative microscope was employed for microdissection.  Dura was opened midline and the tumor was removed.  Frozen section was sent to pathology; results were consistent with meningioma.  Total resection of the tumor was achieved.  The dura was re-closed after full decompression.  Durotomy site was covered with nonsuturable DuraGen.  The skin was closed in a running locked fashion with nylon suture.  Patient was taken to recovery and then to the surgical floor in good condition  Postoperatively, she had some lower extremity pain but her leg numbness and abdominal pain had improved.  She did have incisional pain as expected.  She was kept flat at bedrest for a couple of days.  DVT prophylaxis was with subcutaneous heparin. On the evening of POD, lane was removed and she was ambulated.  Her dressing remains clean and dry.  She worked with PT/OT and did well.  She was discharged to home on postoperative day 4 with home health, home physical therapy and Occupational Therapy    Condition on Discharge:  Stable  Discharge to: home with home health, PT/OT    PATIENT SPECIFIC EDUCATION/PLAN:  Follow up with  neurosurgery PA in 10-12 days for wound check and suture removal  Keep incision clean and dry.   Patient may get Aquacel dressing wet in the shower.   Patient may remove Aquacel dressing and get incision(s) itself wet in the shower beginning on 11/3.   No tub bathing or swimming until follow-up.   Apply ice to incision as needed for pain or swelling.  Ambulate frequently at home.   Patient may sit as tolerated.   No lifting greater than 5 pounds.   No driving until follow-up.   Head of bed at 30-40 degrees or higher at ALL times until follow-up.    Discharge Medications     Discharge Medications      New Medications      Instructions Start Date   HYDROcodone-acetaminophen 7.5-325 MG per tablet  Commonly known as: NORCO   1 tablet, Oral, 3 Times Daily PRN         Changes to Medications      Instructions Start Date   gabapentin 300 MG capsule  Commonly known as: NEURONTIN  What changed: when to take this   TAKE ONE CAPSULE BY MOUTH THREE TIMES A DAY         Continue These Medications      Instructions Start Date   aspirin tablet   81 mg, Oral, Daily      CITRACAL PLUS PO   Oral      diclofenac 75 MG EC tablet  Commonly known as: VOLTAREN   75 mg, Oral, 2 Times Daily PRN      estradiol 10 MCG tablet vaginal tablet  Commonly known as: Yuvafem   10 mcg, Vaginal, 2 Times Weekly      losartan-hydrochlorothiazide 50-12.5 MG per tablet  Commonly known as: Hyzaar   1 tablet, Oral, Daily      nystatin 544758 UNIT/GM ointment  Commonly known as: MYCOSTATIN   1 application, Topical, 2 Times Daily             Follow-up Appointments  Future Appointments   Date Time Provider Department Center   11/4/2022 11:00 AM Rosamaria Reis PA-C [facility]   11/8/2022 To Be Determined Iarm Mccall, [facility] None   11/15/2022 10:00 AM Marshal Guerrero MD [facility]   1/10/2023  1:00 PM Yoko Enriquez APRN [facility]     Additional Instructions for the Follow-ups that You Need to Schedule     Ambulatory  Referral to Home Health (Hospital)   As directed      Face to Face Visit Date: 10/31/2022    Follow-up provider for Plan of Care?: I will be treating the patient on an ongoing basis.  Please send me the Plan of Care for signature.    Follow-up provider: DWIGHT CANO [[id number]]    Reason/Clinical Findings: Diminished mobility after thoracic tumor resection    Describe mobility limitations that make leaving home difficult: As above    Nursing/Therapeutic Services Requested: Physical Therapy Occupational Therapy    PT orders: Therapeutic exercise Gait Training Strengthening    Weight Bearing Status: As Tolerated    Occupational orders: Activities of daily living Strengthening Home safety assessment    Frequency: 1 Week 1         Discharge Follow-up with Specified Provider: [facility]; 2 Weeks   As directed      To: [facility]    Follow Up: 2 Weeks    Follow Up Details: Follow-up with physician's assistant in 10-12 days for suture removal               Referring Provider  MD WALTER Ohara Michael L, MD Talitha L Hunt, PA-C  11/02/22  08:19 EDT

## 2022-11-02 NOTE — TELEPHONE ENCOUNTER
Cancel the duplex. If she is having issues she would need to re-evaluated since it was ordered 3 months ago.

## 2022-11-04 ENCOUNTER — OFFICE VISIT (OUTPATIENT)
Dept: NEUROSURGERY | Facility: CLINIC | Age: 72
End: 2022-11-04

## 2022-11-04 VITALS
BODY MASS INDEX: 38.96 KG/M2 | OXYGEN SATURATION: 97 % | SYSTOLIC BLOOD PRESSURE: 100 MMHG | TEMPERATURE: 97.3 F | HEART RATE: 65 BPM | HEIGHT: 59 IN | DIASTOLIC BLOOD PRESSURE: 70 MMHG | RESPIRATION RATE: 16 BRPM

## 2022-11-04 DIAGNOSIS — R20.2 NUMBNESS AND TINGLING OF BOTH LEGS: ICD-10-CM

## 2022-11-04 DIAGNOSIS — D32.1 SPINAL MENINGIOMA: Primary | ICD-10-CM

## 2022-11-04 DIAGNOSIS — R20.0 NUMBNESS AND TINGLING OF BOTH LEGS: ICD-10-CM

## 2022-11-04 PROCEDURE — 99024 POSTOP FOLLOW-UP VISIT: CPT | Performed by: PHYSICIAN ASSISTANT

## 2022-11-04 NOTE — PROGRESS NOTES
Patient: Bharti Haines  : 1950  Chart #: [id number]    Date of Service: 2022    CHIEF COMPLAINT: Intradural extramedullary thoracic tumor    History of Present Illness Ms. Haines is a pleasant 72-year-old woman who is well-known to Dr. Montejo service.  Remotely she underwent lumbar intervention with Dr. Montejo.  More recently she presented with progressive abdominal and lower extremity numbness as well as lower extremity weakness.  Preoperative studies demonstrated a lesion in the thoracic spine felt to be an intradural extramedullary lesion consistent with meningioma.  There was sebastián cord compression.  As such on 10/27/2022 she underwent T6 and T7 laminectomies for resection of this lesion.  Pathology came back consistent with meningioma.  Today patient is 9 days postop and presents to have sutures removed.  She complains of a fair amount of pain about the incision.  No swelling, redness or drainage.  Lower extremity numbness is improved.  No new symptoms.    Past Medical History:   Diagnosis Date   • Allergic    • Arthritis    • Back problem    • Breast cancer (HCC)     right   • Cancer (HCC)     breast   • Cataract    • History of breast cancer    • HL (hearing loss)    • Hx of radiation therapy     right breast   • Hyperlipidemia    • Hypertension    • Menopause    • Osteoarthritis    • Peptic ulcer    • PONV (postoperative nausea and vomiting)    • Sciatica          Current Outpatient Medications:   •  aspirin tablet, Take 81 mg by mouth daily., Disp: , Rfl:   •  diclofenac (VOLTAREN) 75 MG EC tablet, Take 1 tablet by mouth 2 (Two) Times a Day As Needed (prn)., Disp: 60 tablet, Rfl: 1  •  estradiol (Yuvafem) 10 MCG tablet vaginal tablet, Insert 1 tablet into the vagina 2 (Two) Times a Week., Disp: 8 tablet, Rfl: 12  •  gabapentin (NEURONTIN) 300 MG capsule, TAKE ONE CAPSULE BY MOUTH THREE TIMES A DAY (Patient taking differently: Take 1 capsule by mouth Every Night.), Disp: 90 capsule,  "Rfl: 2  •  HYDROcodone-acetaminophen (NORCO) 7.5-325 MG per tablet, Take 1 tablet by mouth 3 (Three) Times a Day As Needed for Moderate Pain (Pain)., Disp: 20 tablet, Rfl: 0  •  losartan-hydrochlorothiazide (Hyzaar) 50-12.5 MG per tablet, Take 1 tablet by mouth Daily., Disp: 90 tablet, Rfl: 0  •  Multiple Minerals-Vitamins (CITRACAL PLUS PO), Take  by mouth., Disp: , Rfl:   •  nystatin (MYCOSTATIN) 020805 UNIT/GM ointment, Apply 1 application topically to the appropriate area as directed 2 (Two) Times a Day., Disp: 30 g, Rfl: 2  •  promethazine (PHENERGAN) 12.5 MG tablet, Take 1 tablet by mouth Every 6 (Six) Hours As Needed for Nausea or Vomiting for up to 7 days., Disp: 20 tablet, Rfl: 0    Past Surgical History:   Procedure Laterality Date   • BACK SURGERY      left L4-5 discectomy, Dr. Montejo. [facility]. [place], [place]   • BREAST BIOPSY Right 2008    US bx   • BREAST LUMPECTOMY Right 2008   • BREAST SURGERY     • CARPAL TUNNEL RELEASE Bilateral    •  SECTION     • COLONOSCOPY     • JOINT REPLACEMENT Left 2021    total hip on left   • LUMBAR SPINAL CORD TUMOR REMOVAL N/A 10/27/2022    Procedure: THORACIC LAMINECTOMY DECOMPRESSIVE POSTERIOR FOR TUMOR T7  ;  Surgeon: Hipolito Montejo MD;  Location: [facility];  Service: Neurosurgery;  Laterality: N/A;   • REPLACEMENT TOTAL KNEE Bilateral        Social History     Socioeconomic History   • Marital status:    Tobacco Use   • Smoking status: Never   • Smokeless tobacco: Never   Vaping Use   • Vaping Use: Never used   Substance and Sexual Activity   • Alcohol use: No   • Drug use: No   • Sexual activity: Yes     Partners: Male     Birth control/protection: Post-menopausal         Review of Systems    Objective   Vital Signs: Blood pressure 100/70, pulse 65, temperature 97.3 °F (36.3 °C), resp. rate 16, height 149.9 cm (59.02\"), SpO2 97 %, not currently breastfeeding.  Physical Exam  Vitals and nursing note reviewed. "   Constitutional:       General: She is not in acute distress.     Appearance: She is well-developed.   HENT:      Head: Normocephalic and atraumatic.   Skin:     Comments: Nylon sutures are in place and incision is healing nicely.  No signs or symptoms of infection.   Psychiatric:         Behavior: Behavior normal.         Thought Content: Thought content normal.       Assessment & Plan   Diagnosis: Intradural extramedullary thoracic meningioma    Medical Decision Making: I am going to leave the sutures in place for another week.  I discussed wound care instructions.  Patient will keep the incision clean, dry, and covered until she is seen back in the office.  She has some pain about the incision but otherwise is doing well.  The incision is healing nicely.    Diagnoses and all orders for this visit:    1. Spinal meningioma (HCC) (Primary)    2. Numbness and tingling of both legs                      Rosamaria Reis PA-C  Patient Care Team:  Marshal Guerrero MD as PCP - General (Internal Medicine)  Chucho Pollard MD as Consulting Physician (Anesthesiology)

## 2022-11-08 ENCOUNTER — HOME CARE VISIT (OUTPATIENT)
Dept: HOME HEALTH SERVICES | Facility: HOME HEALTHCARE | Age: 72
End: 2022-11-08

## 2022-11-08 ENCOUNTER — TELEPHONE (OUTPATIENT)
Dept: NEUROSURGERY | Facility: CLINIC | Age: 72
End: 2022-11-08

## 2022-11-08 VITALS — DIASTOLIC BLOOD PRESSURE: 66 MMHG | HEART RATE: 67 BPM | SYSTOLIC BLOOD PRESSURE: 128 MMHG | OXYGEN SATURATION: 95 %

## 2022-11-08 PROCEDURE — G0152 HHCP-SERV OF OT,EA 15 MIN: HCPCS

## 2022-11-08 NOTE — TELEPHONE ENCOUNTER
Provider:   G[facility]  Surgery/Procedure:  THORACIC LAMINECTOMY DECOMPRESSIVE POSTERIOR FOR TUMOR T7    Surgery/Procedure Date:  10-27-22  Last visit:   11-4-22  Next visit: 11-11-22       Reason for call: Iram from home health called while at the patients house and ask if a home health nurse could take her sutures out so she would not have to come back in. I told Iram the patient would have to come in that Dr. Montejo did not allow someone from home health to take them out. She told the patient at the time that they would not be allowed to take them out.  I have clarified this with Tequila Reis who also agreed.   I have called the patient back and she said that it was tough for her to get here and that Dr. Montejo did not even come in and see her and that he was not the one that told her how bad this was.  She was upset that she is having to come back in. I told her that we would see her Friday her response was I know it, bye and hung up.

## 2022-11-09 ENCOUNTER — HOME CARE VISIT (OUTPATIENT)
Dept: HOME HEALTH SERVICES | Facility: HOME HEALTHCARE | Age: 72
End: 2022-11-09

## 2022-11-09 VITALS
RESPIRATION RATE: 16 BRPM | OXYGEN SATURATION: 97 % | DIASTOLIC BLOOD PRESSURE: 80 MMHG | TEMPERATURE: 97.7 F | HEART RATE: 77 BPM | SYSTOLIC BLOOD PRESSURE: 130 MMHG

## 2022-11-09 PROCEDURE — G0157 HHC PT ASSISTANT EA 15: HCPCS

## 2022-11-09 NOTE — HOME HEALTH
Patient is a 72 y.o. female who presented with progressive abdominal and lower extremity numbness as well as lower extremity weakness. Studies demonstrated a lesion in the thoracic spine felt to be an intradural extramedullary lesion consistent with meningioma. There is sebastián cord compression. As such, she presented on 10/27/22 for surgical resection of the lesion. She was discharged home on 11/2/22. Patient seen today for an OT evaluation since return home. She is ambulating in the home frequently with a walker, and reports just weakness and numbness in her BLE's. She was able to demonstrate mod I with most BADL skills, and wanted OT mostly to assist with grab bar placement and DME recommendations. OT provided recommendations for grab bar placement, and also recommended pt think about TTB and use of tub/shower if she feels unsafe stepping in and out of walk in shower without grab bars. Her spouse is unsure grab bars will be able to go into material of the walk in shower, but is going to discuss with who installed shower for them. At this time, patient has no further skilled need for OT services, and will only work with PT in the home before her transition to outpatient.

## 2022-11-11 ENCOUNTER — OFFICE VISIT (OUTPATIENT)
Dept: NEUROSURGERY | Facility: CLINIC | Age: 72
End: 2022-11-11

## 2022-11-11 VITALS
HEIGHT: 59 IN | TEMPERATURE: 97.1 F | HEART RATE: 89 BPM | OXYGEN SATURATION: 98 % | BODY MASS INDEX: 37.38 KG/M2 | SYSTOLIC BLOOD PRESSURE: 140 MMHG | DIASTOLIC BLOOD PRESSURE: 70 MMHG | WEIGHT: 185.4 LBS

## 2022-11-11 DIAGNOSIS — D32.1 SPINAL MENINGIOMA: Primary | ICD-10-CM

## 2022-11-11 PROCEDURE — 99024 POSTOP FOLLOW-UP VISIT: CPT | Performed by: PHYSICIAN ASSISTANT

## 2022-11-11 NOTE — PROGRESS NOTES
Patient: Bharti Haines  : 1950  Chart #: [id number]    Date of Service: 2022    CHIEF COMPLAINT: Intradural extramedullary thoracic tumor    History of Present Illness Ms. Haines is a pleasant 72-year-old woman who is well-known to Dr. Montejo's service.  Remotely she underwent lumbar intervention with Dr. Montejo.  More recently she presented with progressive abdominal and lower extremity numbness as well as lower extremity weakness.  Preoperative studies demonstrated a lesion in the thoracic spine felt to be an intradural extramedullary lesion consistent with meningioma.  There was sebastián cord compression.  As such on 10/27/2022 she underwent T6 and T7 laminectomies for resection of this lesion.  Pathology came back consistent with meningioma.      Patient is two weeks post-op and presents to have sutures removed.  She complains of a fair amount of pain about the incision.  No swelling, redness or drainage.  Lower extremity numbness is improved but not absent.  She has graduated from using a rolling walker to just a cane.  Physical therapy is visiting her at home.  No new symptoms.    Past Medical History:   Diagnosis Date   • Allergic    • Arthritis    • Back problem    • Breast cancer (HCC) 2008    right   • Cancer (HCC)     breast   • Cataract    • History of breast cancer    • HL (hearing loss)    • Hx of radiation therapy     right breast   • Hyperlipidemia    • Hypertension    • Menopause    • Osteoarthritis    • Peptic ulcer    • PONV (postoperative nausea and vomiting)    • Sciatica          Current Outpatient Medications:   •  aspirin tablet, Take 81 mg by mouth daily., Disp: , Rfl:   •  gabapentin (NEURONTIN) 300 MG capsule, TAKE ONE CAPSULE BY MOUTH THREE TIMES A DAY (Patient taking differently: Take 1 capsule by mouth Every Night.), Disp: 90 capsule, Rfl: 2  •  losartan-hydrochlorothiazide (Hyzaar) 50-12.5 MG per tablet, Take 1 tablet by mouth Daily., Disp: 90 tablet, Rfl: 0  •   "diclofenac (VOLTAREN) 75 MG EC tablet, Take 1 tablet by mouth 2 (Two) Times a Day As Needed (prn)., Disp: 60 tablet, Rfl: 1  •  estradiol (Yuvafem) 10 MCG tablet vaginal tablet, Insert 1 tablet into the vagina 2 (Two) Times a Week., Disp: 8 tablet, Rfl: 12  •  HYDROcodone-acetaminophen (NORCO) 7.5-325 MG per tablet, Take 1 tablet by mouth 3 (Three) Times a Day As Needed for Moderate Pain (Pain)., Disp: 20 tablet, Rfl: 0  •  Multiple Minerals-Vitamins (CITRACAL PLUS PO), Take  by mouth., Disp: , Rfl:   •  nystatin (MYCOSTATIN) 710906 UNIT/GM ointment, Apply 1 application topically to the appropriate area as directed 2 (Two) Times a Day., Disp: 30 g, Rfl: 2    Past Surgical History:   Procedure Laterality Date   • BACK SURGERY      left L4-5 discectomy, Dr. Montejo. [facility]. [place], [place]   • BREAST BIOPSY Right 2008    US bx   • BREAST LUMPECTOMY Right 2008   • BREAST SURGERY     • CARPAL TUNNEL RELEASE Bilateral    •  SECTION     • COLONOSCOPY     • JOINT REPLACEMENT Left 2021    total hip on left   • LUMBAR SPINAL CORD TUMOR REMOVAL N/A 10/27/2022    Procedure: THORACIC LAMINECTOMY DECOMPRESSIVE POSTERIOR FOR TUMOR T7  ;  Surgeon: Hipolito Montejo MD;  Location: [facility];  Service: Neurosurgery;  Laterality: N/A;   • REPLACEMENT TOTAL KNEE Bilateral        Social History     Socioeconomic History   • Marital status:    Tobacco Use   • Smoking status: Never   • Smokeless tobacco: Never   Vaping Use   • Vaping Use: Never used   Substance and Sexual Activity   • Alcohol use: No   • Drug use: No   • Sexual activity: Yes     Partners: Male     Birth control/protection: Post-menopausal         Review of Systems    Objective   Vital Signs: Blood pressure 140/70, pulse 89, temperature 97.1 °F (36.2 °C), height 149.9 cm (59.02\"), weight 84.1 kg (185 lb 6.4 oz), SpO2 98 %, not currently breastfeeding.  Physical Exam  Vitals and nursing note reviewed.   Constitutional:       " General: She is not in acute distress.     Appearance: She is well-developed.   HENT:      Head: Normocephalic and atraumatic.   Skin:     Comments: Nylon sutures are in place and incision is healing nicely.  No signs or symptoms of infection. Nylon sutures were removed in a clean fashion and incision remained intact   Psychiatric:         Behavior: Behavior normal.         Thought Content: Thought content normal.       Assessment & Plan   Diagnosis: Intradural extramedullary thoracic meningioma    Medical Decision Making: Sutures were removed and further wound care instructions were discussed.  Patient is already showing good improvement in preoperative symptoms.  Her pain is much better this week than last week.  She is doing therapy at home.  Dr. Montejo we will plan to see her back in 6 weeks to check her progress.  Call the office in the interim with any questions or concerns.      There are no diagnoses linked to this encounter.                  Rosamaria Reis PA-C  Patient Care Team:  Marshal Guerrero MD as PCP - General (Internal Medicine)  Chucho Pollard MD as Consulting Physician (Anesthesiology)

## 2022-11-15 PROCEDURE — G0180 MD CERTIFICATION HHA PATIENT: HCPCS | Performed by: NEUROLOGICAL SURGERY

## 2022-11-16 ENCOUNTER — HOME CARE VISIT (OUTPATIENT)
Dept: HOME HEALTH SERVICES | Facility: HOME HEALTHCARE | Age: 72
End: 2022-11-16

## 2022-11-16 VITALS
RESPIRATION RATE: 17 BRPM | OXYGEN SATURATION: 98 % | HEART RATE: 70 BPM | TEMPERATURE: 97.3 F | DIASTOLIC BLOOD PRESSURE: 66 MMHG | SYSTOLIC BLOOD PRESSURE: 137 MMHG

## 2022-11-16 PROCEDURE — G0151 HHCP-SERV OF PT,EA 15 MIN: HCPCS

## 2022-11-17 ENCOUNTER — TELEPHONE (OUTPATIENT)
Dept: INTERNAL MEDICINE | Facility: CLINIC | Age: 72
End: 2022-11-17

## 2022-11-17 ENCOUNTER — DOCUMENTATION (OUTPATIENT)
Dept: INTERNAL MEDICINE | Facility: CLINIC | Age: 72
End: 2022-11-17

## 2022-11-17 DIAGNOSIS — R30.0 DYSURIA: Primary | ICD-10-CM

## 2022-11-17 RX ORDER — NITROFURANTOIN 25; 75 MG/1; MG/1
100 CAPSULE ORAL 2 TIMES DAILY
Qty: 14 CAPSULE | Refills: 0 | Status: SHIPPED | OUTPATIENT
Start: 2022-11-17 | End: 2022-11-24

## 2022-11-17 RX ORDER — CIPROFLOXACIN 500 MG/1
500 TABLET, FILM COATED ORAL 2 TIMES DAILY
Qty: 14 TABLET | Refills: 0 | Status: SHIPPED | OUTPATIENT
Start: 2022-11-17 | End: 2022-11-17 | Stop reason: ALTCHOICE

## 2022-11-17 NOTE — TELEPHONE ENCOUNTER
Caller: Bharti Haines    Relationship: Self    Best call back number: [phone]    What orders are you requesting (i.e. lab or imaging): URINALYSIS    In what timeframe would the patient need to come in: ASAP    Where will you receive your lab/imaging services: IN-OFFICE    Additional notes: PATIENT DOES NOT WANT TO MAKE AN APPOINTMENT, PATIENT IS WANTING A URINALYSIS DUE TO PRESSURE TO USE THE RESTROOM.

## 2022-11-17 NOTE — TELEPHONE ENCOUNTER
I have called in cipro.  She will stop in to obtain u/a and cx prior to starting abx.  If symptoms worsen, don't improve, develops a fever needs to be seen. '  Marshal Guerrero MD  17:31 EST  11/17/22

## 2022-11-17 NOTE — CASE COMMUNICATION
Patient discharged from home care services per patient request.  She states she is at modified indep/indep with performance of functional mobility tasks.

## 2022-11-17 NOTE — TELEPHONE ENCOUNTER
Spoke to patient and she mentioned she has surgery on October 27 th and came home Monday. She said she has a very uncomfortable feeling and she feels like she may have an infection. I asked her when she urinates if it burns and she denied any other symptoms, patients stated she also hasn't paid attention to the odor. She would like to come in some time tomorrow to leave a urine sample to rule out an infection, if possible she would like to know and have all this done by Monday.   Please Advise

## 2022-11-18 ENCOUNTER — LAB (OUTPATIENT)
Dept: LAB | Facility: HOSPITAL | Age: 72
End: 2022-11-18

## 2022-11-18 DIAGNOSIS — R30.0 DYSURIA: ICD-10-CM

## 2022-11-18 LAB
BACTERIA UR QL AUTO: ABNORMAL /HPF
BILIRUB UR QL STRIP: NEGATIVE
CLARITY UR: ABNORMAL
COLOR UR: YELLOW
GLUCOSE UR STRIP-MCNC: NEGATIVE MG/DL
HGB UR QL STRIP.AUTO: ABNORMAL
HYALINE CASTS UR QL AUTO: ABNORMAL /LPF
KETONES UR QL STRIP: NEGATIVE
LEUKOCYTE ESTERASE UR QL STRIP.AUTO: ABNORMAL
NITRITE UR QL STRIP: POSITIVE
PH UR STRIP.AUTO: 6.5 [PH] (ref 5–8)
PROT UR QL STRIP: ABNORMAL
RBC # UR STRIP: ABNORMAL /HPF
REF LAB TEST METHOD: ABNORMAL
SP GR UR STRIP: 1.01 (ref 1–1.03)
SQUAMOUS #/AREA URNS HPF: ABNORMAL /HPF
UROBILINOGEN UR QL STRIP: ABNORMAL
WBC # UR STRIP: ABNORMAL /HPF

## 2022-11-18 PROCEDURE — 87077 CULTURE AEROBIC IDENTIFY: CPT

## 2022-11-18 PROCEDURE — 87186 SC STD MICRODIL/AGAR DIL: CPT

## 2022-11-18 PROCEDURE — 87086 URINE CULTURE/COLONY COUNT: CPT

## 2022-11-18 PROCEDURE — 81001 URINALYSIS AUTO W/SCOPE: CPT

## 2022-11-21 LAB — BACTERIA SPEC AEROBE CULT: ABNORMAL

## 2022-11-22 ENCOUNTER — TELEPHONE (OUTPATIENT)
Dept: INTERNAL MEDICINE | Facility: CLINIC | Age: 72
End: 2022-11-22

## 2022-11-22 NOTE — TELEPHONE ENCOUNTER
Patient had urinalysis done on 11/18/2022 would like to know results, requesting call back, please.

## 2022-11-22 NOTE — TELEPHONE ENCOUNTER
Spoke with patient, states she is taking Macrobid but has not heard back from her urine results from Friday.  Explained that she her urine did reveal a UTI but that she may need a different abx based on her culture.  Explained I will forward message to Dr Guerrero to review in the morning and will let her know.  States she does feel little better but still not feeling right.

## 2022-11-23 RX ORDER — CIPROFLOXACIN 500 MG/1
500 TABLET, FILM COATED ORAL 2 TIMES DAILY
Qty: 14 TABLET | Refills: 0 | Status: SHIPPED | OUTPATIENT
Start: 2022-11-23 | End: 2022-11-30

## 2022-11-23 NOTE — TELEPHONE ENCOUNTER
We need to change to cipro 500 mg po BID x 7 days.   I had already sent this in for her and she called and insisted that she be treated with macrobid so I changed it.  She might already have a prescription for cipro.  Marshal Guerrero MD  07:57 EST  11/23/22

## 2022-11-23 NOTE — TELEPHONE ENCOUNTER
"Attempted to return call with no answer and VM box full and cannot accept new messages.      HUB:  Please inform patient that Dr Guerrero said   \"We need to change to cipro 500 mg po BID x 7 days.   I had already sent this in for her and she called and insisted that she be treated with macrobid so I changed it.  She might already have a prescription for cipro.\"    Ask if patient has Cipro rx or if we need to resend to pharmacy.    Document if notified.   "

## 2022-11-23 NOTE — TELEPHONE ENCOUNTER
Patient returned call, informed that Dr Guerrero said  We need to change to cipro 500 mg po BID x 7 days.   I had already sent this in for her and she called and insisted that she be treated with macrobid so I changed it.  She might already have a prescription for cipro.  States she did not  and told pharmacy to cancel. Explained that we will resend rx to pharmacy for her. States she feels it will make her nauseated like Bactrim and Erythromycin does.  Explained that they are not related meds and that Dr Guerrero said she could take with food but not to take with milk. Verbalized understanding and agreement.     Rx sent via erx.

## 2022-12-05 NOTE — TELEPHONE ENCOUNTER
Caller: Bharti Haines    Relationship: Self    Best call back number: [phone]    Caller requesting test results: MRI     What test was performed: MRI LOWER LUMBAR    When was the test performed: 2/19/21    Where was the test performed: [facility] 123ContactForm    Additional notes: PATIENT CALLED TO CHECK THE STATUS OF THE REQUEST FOR THE RESULTS OF THE MRI. PATIENT STATES THAT SHE IS CURIOUS OF THE NEXT PHASE FOR TREATMENT PLAN.        [facility] phone call message - order or referral request for patient:     Order or referral being requested: Verbal orders for Dana MADDEN from [facility] care called. Return call back number is [phone]       Additional Comments: 2x a week for 3 weeks for strength, gait, and balance.     OK to leave a message on voice mail? Yes    Primary language: English      needed? No    Call taken on December 5, 2022 at 4:17 PM by Janey Huff

## 2022-12-15 ENCOUNTER — TELEPHONE (OUTPATIENT)
Dept: NEUROSURGERY | Facility: CLINIC | Age: 72
End: 2022-12-15

## 2022-12-15 NOTE — TELEPHONE ENCOUNTER
"FYI - Patient called to cancel her 1/4/23 post-op appointment with Dr. Montejo. She had been rescheduled from 12/16/22. Dr. Montejo cancelled 12/16/22 to do an emergent surgery.    Patient felt she was being bounced around and felt like her appointment wasn't important. She told me to tell Dr. Montejo \"to forget about me\". She stated she appreciated what he did for her, but wanted to cancel her appointment.     I apologized and tried to explain that unfortunately we had to cancel office sometimes in order for the providers to do surgeries, but she still wanted to cancel. I advised her to call if she had any symptoms or concerns. She voiced understanding.  "

## 2022-12-30 ENCOUNTER — OFFICE VISIT (OUTPATIENT)
Dept: NEUROSURGERY | Facility: CLINIC | Age: 72
End: 2022-12-30

## 2022-12-30 VITALS — TEMPERATURE: 97.7 F | HEIGHT: 59 IN | BODY MASS INDEX: 38.22 KG/M2 | WEIGHT: 189.6 LBS

## 2022-12-30 DIAGNOSIS — D32.1 SPINAL MENINGIOMA: Primary | ICD-10-CM

## 2022-12-30 PROCEDURE — 99024 POSTOP FOLLOW-UP VISIT: CPT | Performed by: NEUROLOGICAL SURGERY

## 2022-12-30 NOTE — PROGRESS NOTES
Patient: Bharti Haines  : 1950    Primary Care Provider: Marshal Guerrero MD    Requesting Provider: As above        History    Chief Complaint: Progressive lower extremity numbness and weakness.    History of Present Illness: Ms. Haines is a 72 old woman is well-known to my service.  She is previously undergone lumbar surgical intervention.  More recently she presented with the above-noted complaints.  Symptoms also involves her abdomen.  Studies ultimately demonstrated an intradural extramedullary lesion suggestive of meningioma.  On 10/27/2022 she underwent tumor resection via T6 and T7 laminectomies.  Pathology was consistent with a WHO grade 1 meningioma.  She has steadily improved.  She has some very minimal numbness that remains.  She has some low-grade back pain.  She has been increasingly active.  She uses a cane to get around.    Review of Systems   Constitutional: Negative for activity change, appetite change, chills, diaphoresis, fatigue, fever and unexpected weight change.   HENT: Negative for congestion, dental problem, drooling, ear discharge, ear pain, facial swelling, hearing loss, mouth sores, nosebleeds, postnasal drip, rhinorrhea, sinus pressure, sneezing, sore throat, tinnitus, trouble swallowing and voice change.    Eyes: Negative for photophobia, pain, discharge, redness, itching and visual disturbance.   Respiratory: Negative for apnea, cough, choking, chest tightness, shortness of breath, wheezing and stridor.    Cardiovascular: Negative for chest pain, palpitations and leg swelling.   Gastrointestinal: Negative for abdominal distention, abdominal pain, anal bleeding, blood in stool, constipation, diarrhea, nausea, rectal pain and vomiting.   Endocrine: Negative for cold intolerance, heat intolerance, polydipsia, polyphagia and polyuria.   Genitourinary: Negative for decreased urine volume, difficulty urinating, dysuria, enuresis, flank pain, frequency, genital sores,  "hematuria and urgency.   Musculoskeletal: Negative for arthralgias, back pain, gait problem, joint swelling, myalgias, neck pain and neck stiffness.   Skin: Negative for color change, pallor, rash and wound.   Allergic/Immunologic: Negative for environmental allergies, food allergies and immunocompromised state.   Neurological: Negative for dizziness, tremors, seizures, syncope, facial asymmetry, speech difficulty, weakness, light-headedness, numbness and headaches.   Hematological: Negative for adenopathy. Does not bruise/bleed easily.   Psychiatric/Behavioral: Negative for agitation, behavioral problems, confusion, decreased concentration, dysphoric mood, hallucinations, self-injury, sleep disturbance and suicidal ideas. The patient is not nervous/anxious and is not hyperactive.        The patient's past medical history, past surgical history, family history, and social history have been reviewed at length in the electronic medical record.      Physical Exam:   Temp 97.7 °F (36.5 °C) (Infrared)   Ht 149.9 cm (59\")   Wt 86 kg (189 lb 9.6 oz)   LMP  (LMP Unknown)   BMI 38.29 kg/m²   The patient moves about independently.  Her thoracic incision looks quite good.    Medical Decision Making    Diagnosis:   Thoracic meningioma status post T6 and T7 laminectomies for resection.    Treatment Options:   The patient is doing well.  I would like her to increase her walking.  She will follow-up with me in 3 months with a new MRI of the thoracic spine with and without gadolinium to establish new baseline.       Diagnosis Plan   1. Spinal meningioma (HCC)            Scribed for Hipolito Montejo MD by SKYLAR Michelle 12/30/2022 11:10 EST      I, Dr. Montejo, personally performed the services described in the documentation, as scribed in my presence, and it is both accurate and complete.  "

## 2023-03-01 ENCOUNTER — OFFICE VISIT (OUTPATIENT)
Dept: INTERNAL MEDICINE | Facility: CLINIC | Age: 73
End: 2023-03-01
Payer: MEDICARE

## 2023-03-01 VITALS
HEIGHT: 57 IN | WEIGHT: 191 LBS | BODY MASS INDEX: 41.21 KG/M2 | HEART RATE: 68 BPM | SYSTOLIC BLOOD PRESSURE: 124 MMHG | TEMPERATURE: 99.3 F | RESPIRATION RATE: 20 BRPM | DIASTOLIC BLOOD PRESSURE: 84 MMHG

## 2023-03-01 DIAGNOSIS — R73.02 IMPAIRED GLUCOSE TOLERANCE: ICD-10-CM

## 2023-03-01 DIAGNOSIS — E78.5 HYPERLIPIDEMIA, UNSPECIFIED HYPERLIPIDEMIA TYPE: ICD-10-CM

## 2023-03-01 DIAGNOSIS — I10 ESSENTIAL HYPERTENSION: ICD-10-CM

## 2023-03-01 DIAGNOSIS — Z00.00 MEDICARE ANNUAL WELLNESS VISIT, SUBSEQUENT: Primary | ICD-10-CM

## 2023-03-01 LAB
ALBUMIN SERPL-MCNC: 4.3 G/DL (ref 3.5–5.2)
ALBUMIN/CREATININE RATIO, URINE: <30
ALBUMIN/GLOB SERPL: 1.5 G/DL
ALP SERPL-CCNC: 75 U/L (ref 39–117)
ALT SERPL W P-5'-P-CCNC: 11 U/L (ref 1–33)
ANION GAP SERPL CALCULATED.3IONS-SCNC: 11.2 MMOL/L (ref 5–15)
AST SERPL-CCNC: 18 U/L (ref 1–32)
BASOPHILS # BLD AUTO: 0.05 10*3/MM3 (ref 0–0.2)
BASOPHILS NFR BLD AUTO: 0.8 % (ref 0–1.5)
BILIRUB BLD-MCNC: NEGATIVE MG/DL
BILIRUB SERPL-MCNC: 0.6 MG/DL (ref 0–1.2)
BUN SERPL-MCNC: 13 MG/DL (ref 8–23)
BUN/CREAT SERPL: 14.1 (ref 7–25)
CALCIUM SPEC-SCNC: 9.6 MG/DL (ref 8.6–10.5)
CHLORIDE SERPL-SCNC: 101 MMOL/L (ref 98–107)
CHOLEST SERPL-MCNC: 234 MG/DL (ref 0–200)
CLARITY, POC: CLEAR
CO2 SERPL-SCNC: 25.8 MMOL/L (ref 22–29)
COLOR UR: YELLOW
CREAT SERPL-MCNC: 0.92 MG/DL (ref 0.57–1)
DEPRECATED RDW RBC AUTO: 39.9 FL (ref 37–54)
EGFRCR SERPLBLD CKD-EPI 2021: 65.5 ML/MIN/1.73
EOSINOPHIL # BLD AUTO: 0.06 10*3/MM3 (ref 0–0.4)
EOSINOPHIL NFR BLD AUTO: 1 % (ref 0.3–6.2)
ERYTHROCYTE [DISTWIDTH] IN BLOOD BY AUTOMATED COUNT: 13 % (ref 12.3–15.4)
EXPIRATION DATE: ABNORMAL
EXPIRATION DATE: NORMAL
GLOBULIN UR ELPH-MCNC: 2.9 GM/DL
GLUCOSE SERPL-MCNC: 95 MG/DL (ref 65–99)
GLUCOSE UR STRIP-MCNC: NEGATIVE MG/DL
HBA1C MFR BLD: 5.7 % (ref 4.8–5.6)
HCT VFR BLD AUTO: 39.9 % (ref 34–46.6)
HDLC SERPL-MCNC: 59 MG/DL (ref 40–60)
HGB BLD-MCNC: 13.9 G/DL (ref 12–15.9)
IMM GRANULOCYTES # BLD AUTO: 0.03 10*3/MM3 (ref 0–0.05)
IMM GRANULOCYTES NFR BLD AUTO: 0.5 % (ref 0–0.5)
KETONES UR QL: NEGATIVE
LDLC SERPL CALC-MCNC: 156 MG/DL (ref 0–100)
LDLC/HDLC SERPL: 2.6 {RATIO}
LEUKOCYTE EST, POC: ABNORMAL
LYMPHOCYTES # BLD AUTO: 1.65 10*3/MM3 (ref 0.7–3.1)
LYMPHOCYTES NFR BLD AUTO: 26.4 % (ref 19.6–45.3)
Lab: ABNORMAL
Lab: NORMAL
MCH RBC QN AUTO: 29.5 PG (ref 26.6–33)
MCHC RBC AUTO-ENTMCNC: 34.8 G/DL (ref 31.5–35.7)
MCV RBC AUTO: 84.7 FL (ref 79–97)
MONOCYTES # BLD AUTO: 0.56 10*3/MM3 (ref 0.1–0.9)
MONOCYTES NFR BLD AUTO: 8.9 % (ref 5–12)
NEUTROPHILS NFR BLD AUTO: 3.91 10*3/MM3 (ref 1.7–7)
NEUTROPHILS NFR BLD AUTO: 62.4 % (ref 42.7–76)
NITRITE UR-MCNC: NEGATIVE MG/ML
NRBC BLD AUTO-RTO: 0 /100 WBC (ref 0–0.2)
PH UR: 7 [PH] (ref 5–8)
PLATELET # BLD AUTO: 228 10*3/MM3 (ref 140–450)
PMV BLD AUTO: 10 FL (ref 6–12)
POC CREATININE URINE: 50
POC MICROALBUMIN URINE: 10
POTASSIUM SERPL-SCNC: 3.9 MMOL/L (ref 3.5–5.2)
PROT SERPL-MCNC: 7.2 G/DL (ref 6–8.5)
PROT UR STRIP-MCNC: NEGATIVE MG/DL
RBC # BLD AUTO: 4.71 10*6/MM3 (ref 3.77–5.28)
RBC # UR STRIP: NEGATIVE /UL
SODIUM SERPL-SCNC: 138 MMOL/L (ref 136–145)
SP GR UR: 1 (ref 1–1.03)
TRIGL SERPL-MCNC: 107 MG/DL (ref 0–150)
TSH SERPL DL<=0.05 MIU/L-ACNC: 0.94 UIU/ML (ref 0.27–4.2)
UROBILINOGEN UR QL: NORMAL
VLDLC SERPL-MCNC: 19 MG/DL (ref 5–40)
WBC NRBC COR # BLD: 6.26 10*3/MM3 (ref 3.4–10.8)

## 2023-03-01 PROCEDURE — 84443 ASSAY THYROID STIM HORMONE: CPT | Performed by: INTERNAL MEDICINE

## 2023-03-01 PROCEDURE — 82044 UR ALBUMIN SEMIQUANTITATIVE: CPT | Performed by: INTERNAL MEDICINE

## 2023-03-01 PROCEDURE — 83036 HEMOGLOBIN GLYCOSYLATED A1C: CPT | Performed by: INTERNAL MEDICINE

## 2023-03-01 PROCEDURE — G0439 PPPS, SUBSEQ VISIT: HCPCS | Performed by: INTERNAL MEDICINE

## 2023-03-01 PROCEDURE — 85025 COMPLETE CBC W/AUTO DIFF WBC: CPT | Performed by: INTERNAL MEDICINE

## 2023-03-01 PROCEDURE — 99214 OFFICE O/P EST MOD 30 MIN: CPT | Performed by: INTERNAL MEDICINE

## 2023-03-01 PROCEDURE — 81003 URINALYSIS AUTO W/O SCOPE: CPT | Performed by: INTERNAL MEDICINE

## 2023-03-01 PROCEDURE — 80053 COMPREHEN METABOLIC PANEL: CPT | Performed by: INTERNAL MEDICINE

## 2023-03-01 PROCEDURE — 36415 COLL VENOUS BLD VENIPUNCTURE: CPT | Performed by: INTERNAL MEDICINE

## 2023-03-01 PROCEDURE — 80061 LIPID PANEL: CPT | Performed by: INTERNAL MEDICINE

## 2023-03-01 RX ORDER — OMEPRAZOLE 20 MG/1
20 CAPSULE, DELAYED RELEASE ORAL DAILY
COMMUNITY

## 2023-03-01 NOTE — PROGRESS NOTES
The ABCs of the Annual Wellness Visit  Subsequent Medicare Wellness Visit    Subjective      Bharti Haines is a 73 y.o. female who presents for a Subsequent Medicare Wellness Visit.    The following portions of the patient's history were reviewed and   updated as appropriate: allergies, current medications, past family history, past medical history, past social history, past surgical history and problem list.    Compared to one year ago, the patient feels her physical   health is the same.    Compared to one year ago, the patient feels her mental   health is the same.    Recent Hospitalizations:  This patient has had a [facility] admission record on file within the last 365 days.    Current Medical Providers:  Patient Care Team:  Marshal Guerrero MD as PCP - General (Internal Medicine)  Chucho Pollard MD as Consulting Physician (Anesthesiology)    Outpatient Medications Prior to Visit   Medication Sig Dispense Refill   • aspirin tablet Take 81 mg by mouth daily.     • diclofenac (VOLTAREN) 75 MG EC tablet Take 1 tablet by mouth 2 (Two) Times a Day As Needed (prn). 60 tablet 1   • gabapentin (NEURONTIN) 300 MG capsule TAKE ONE CAPSULE BY MOUTH THREE TIMES A DAY (Patient taking differently: Take 1 capsule by mouth Every Night.) 90 capsule 2   • losartan-hydrochlorothiazide (Hyzaar) 50-12.5 MG per tablet Take 1 tablet by mouth Daily. 90 tablet 0   • Multiple Minerals-Vitamins (CITRACAL PLUS PO) Take  by mouth.     • omeprazole (priLOSEC) 20 MG capsule Take 1 capsule by mouth Daily.     • estradiol (Yuvafem) 10 MCG tablet vaginal tablet Insert 1 tablet into the vagina 2 (Two) Times a Week. 8 tablet 12   • HYDROcodone-acetaminophen (NORCO) 7.5-325 MG per tablet Take 1 tablet by mouth 3 (Three) Times a Day As Needed for Moderate Pain (Pain). 20 tablet 0   • nystatin (MYCOSTATIN) 913637 UNIT/GM ointment Apply 1 application topically to the appropriate area as directed 2 (Two) Times a Day. 30 g 2     No  "facility-administered medications prior to visit.       Opioid medication/s are on active medication list.  and I have evaluated her active treatment plan and pain score trends (see table).  Vitals:    03/01/23 0933   PainSc: 0-No pain     I have reviewed the chart for potential of high risk medication and harmful drug interactions in the elderly.            Aspirin is on active medication list. Aspirin use is indicated based on review of current medical condition/s. Pros and cons of this therapy have been discussed today. Benefits of this medication outweigh potential harm.  Patient has been encouraged to continue taking this medication.  .      Patient Active Problem List   Diagnosis   • Arthritis   • Hyperlipidemia   • Hyperglycemia   • Hypertension   • Class 2 severe obesity due to excess calories with serious comorbidity and body mass index (BMI) of 39.0 to 39.9 in adult (HCC)   • Muscular aches   • Venous insufficiency   • Osteoarthritis   • Low back pain   • Arthralgia   • Lumbar radiculopathy   • Spinal stenosis, lumbar region, with neurogenic claudication   • Encounter for pre-operative examination   • Spinal meningioma ([facility])     Advance Care Planning  Advance Directive is not on file.  ACP discussion was held with the patient during this visit. Patient does not have an advance directive, declines further assistance.     Objective    Vitals:    03/01/23 0933   BP: 158/84   BP Location: Left arm   Patient Position: Sitting   Cuff Size: Adult   Pulse: 68   Resp: 20   Temp: 99.3 °F (37.4 °C)   TempSrc: Infrared   Weight: 86.6 kg (191 lb)   Height: 143.5 cm (56.5\")   PainSc: 0-No pain     Estimated body mass index is 42.07 kg/m² as calculated from the following:    Height as of this encounter: 143.5 cm (56.5\").    Weight as of this encounter: 86.6 kg (191 lb).    Class 3 Severe Obesity (BMI >=40). Obesity-related health conditions include the following: hypertension and dyslipidemias. Obesity is unchanged. BMI " is is above average; BMI management plan is completed. We discussed low calorie, low carb based diet program, portion control and increasing exercise.  Finger Rub Hearing{Test (right ear):passed  Finger Rub Hearing{Test (left ear):passed        Does the patient have evidence of cognitive impairment?   No            HEALTH RISK ASSESSMENT    Smoking Status:  Social History     Tobacco Use   Smoking Status Never   Smokeless Tobacco Never     Alcohol Consumption:  Social History     Substance and Sexual Activity   Alcohol Use No     Fall Risk Screen:    STEADI Fall Risk Assessment was completed, and patient is at LOW risk for falls.Assessment completed on:3/1/2023    Depression Screening:  PHQ-2/PHQ-9 Depression Screening 3/1/2023   Little Interest or Pleasure in Doing Things 0-->not at all   Feeling Down, Depressed or Hopeless 0-->not at all   PHQ-9: Brief Depression Severity Measure Score 0       Health Habits and Functional and Cognitive Screening:  Functional & Cognitive Status 3/1/2023   Do you have difficulty preparing food and eating? No   Do you have difficulty bathing yourself, getting dressed or grooming yourself? No   Do you have difficulty using the toilet? No   Do you have difficulty moving around from place to place? No   Do you have trouble with steps or getting out of a bed or a chair? No   Current Diet Unhealthy Diet   Dental Exam Up to date   Eye Exam Up to date   Exercise (times per week) 0 times per week   Current Exercises Include No Regular Exercise   Current Exercise Activities Include -   Do you need help using the phone?  No   Are you deaf or do you have serious difficulty hearing?  No   Do you need help with transportation? No   Do you need help shopping? No   Do you need help preparing meals?  No   Do you need help with housework?  Yes   Do you need help with laundry? No   Do you need help taking your medications? No   Do you need help managing money? No   Do you ever drive or ride in a car  without wearing a seat belt? No   Have you felt unusual stress, anger or loneliness in the last month? No   Who do you live with? Spouse   If you need help, do you have trouble finding someone available to you? No   Have you been bothered in the last four weeks by sexual problems? No   Do you have difficulty concentrating, remembering or making decisions? No       Age-appropriate Screening Schedule:  Refer to the list below for future screening recommendations based on patient's age, sex and/or medical conditions. Orders for these recommended tests are listed in the plan section. The patient has been provided with a written plan.    Health Maintenance   Topic Date Due   • DXA SCAN  Never done   • ZOSTER VACCINE (2 of 3) 07/18/2014   • COVID-19 Vaccine (4 - Booster for Moderna series) 01/31/2022   • ANNUAL WELLNESS VISIT  02/23/2023   • LIPID PANEL  07/11/2023   • MAMMOGRAM  01/18/2024   • TDAP/TD VACCINES (2 - Td or Tdap) 05/23/2024   • COLORECTAL CANCER SCREENING  01/21/2032   • HEPATITIS C SCREENING  Completed   • INFLUENZA VACCINE  Completed   • Pneumococcal Vaccine 65+  Completed                CMS Preventative Services Quick Reference  Risk Factors Identified During Encounter:    Immunizations Discussed/Encouraged: Shingrix and COVID19    The above risks/problems have been discussed with the patient.  Pertinent information has been shared with the patient in the After Visit Summary.    Diagnoses and all orders for this visit:    1. Medicare annual wellness visit, subsequent (Primary)    2. Essential hypertension  -     CBC & Differential; Future  -     Comprehensive Metabolic Panel; Future  -     TSH; Future  -     POC Urinalysis Dipstick, Automated; Future  -     CBC & Differential  -     Comprehensive Metabolic Panel  -     TSH    3. Hyperlipidemia, unspecified hyperlipidemia type  -     Comprehensive Metabolic Panel; Future  -     Lipid Panel; Future  -     Comprehensive Metabolic Panel  -     Lipid  Panel    4. Impaired glucose tolerance  -     Comprehensive Metabolic Panel; Future  -     Hemoglobin A1c; Future  -     POC Microalbumin; Future  -     Comprehensive Metabolic Panel  -     Hemoglobin A1c        Follow Up:   Next Medicare Wellness visit to be scheduled in 1 year.      An After Visit Summary and PPPS were made available to the patient.

## 2023-03-01 NOTE — PROGRESS NOTES
Chief Complaint   Patient presents with   • Annual Exam     Subsequent annual wellness visit         History of Present Illness      The patient presents for a follow-up related to hyperlipidemia. She is following a low fat diet. She reports that she is exercising. She is not taking medication for hyperlipidemia. She denies chest pain, shortness of breath, orthopnea, paroxysmal nocturnal dyspnea, dyspnea on exertion, edema, palpitations or syncope.    The patient presents for a follow-up related to hypertension. The patient reports that she has had no headaches or blurred vision. She states that she is taking her medication as prescribed. She is not having medication side effects.    The patient presents for a follow-up related to impaired glucose tolerance. She does not check her blood sugars at home. She denies hypoglycemic symptoms. The patient denies polyuria, polydypsia or polyphagia. She reports no symptoms of neuropathy. The patient is not on medication for her impaired glucose tolerance. The patient does check her feet daily at home.    Medications      Current Outpatient Medications:   •  aspirin tablet, Take 81 mg by mouth daily., Disp: , Rfl:   •  diclofenac (VOLTAREN) 75 MG EC tablet, Take 1 tablet by mouth 2 (Two) Times a Day As Needed (prn)., Disp: 60 tablet, Rfl: 1  •  gabapentin (NEURONTIN) 300 MG capsule, TAKE ONE CAPSULE BY MOUTH THREE TIMES A DAY (Patient taking differently: Take 1 capsule by mouth Every Night.), Disp: 90 capsule, Rfl: 2  •  losartan-hydrochlorothiazide (Hyzaar) 50-12.5 MG per tablet, Take 1 tablet by mouth Daily., Disp: 90 tablet, Rfl: 0  •  Multiple Minerals-Vitamins (CITRACAL PLUS PO), Take  by mouth., Disp: , Rfl:   •  omeprazole (priLOSEC) 20 MG capsule, Take 1 capsule by mouth Daily., Disp: , Rfl:      Allergies    Allergies   Allergen Reactions   • Cefdinir Rash and Other (See Comments)   • Macrolides And Ketolides Rash   • Penicillins Rash   • Percocet  "[Oxycodone-Acetaminophen] Rash   • Prilosec [Omeprazole] Myalgia   • Zofran [Ondansetron Hcl] Itching       Problem List    Patient Active Problem List   Diagnosis   • Arthritis   • Hyperlipidemia   • Hyperglycemia   • Hypertension   • Class 2 severe obesity due to excess calories with serious comorbidity and body mass index (BMI) of 39.0 to 39.9 in adult (HCC)   • Muscular aches   • Venous insufficiency   • Osteoarthritis   • Low back pain   • Arthralgia   • Lumbar radiculopathy   • Spinal stenosis, lumbar region, with neurogenic claudication   • Encounter for pre-operative examination   • Spinal meningioma (HCC)       Medications, Allergies, Problems List and Past History were reviewed and updated.    Physical Examination    /84   Pulse 68   Temp 99.3 °F (37.4 °C) (Infrared)   Resp 20   Ht 143.5 cm (56.5\")   Wt 86.6 kg (191 lb)   LMP  (LMP Unknown) Comment: papsmear january 2022 by Dr. Nunez  BMI 42.07 kg/m²     HEENT: Facies- Within normal limits. Lids- Normal bilaterally. Conjunctiva- Clear bilaterally. Sclera- Anicteric bilaterally.    Neck: Thyroid- non enlarged, symmetric and has no nodules. No bruits are detected.    Lungs: Auscultation- Clear to auscultation bilaterally. There are no retractions, clubbing or cyanosis. The Expiratory to Inspiratory ratio is equal.    Cardiovascular: There are no carotid bruits. Heart- Normal Rate with Regular rhythm and no murmurs. There are no gallops. There are no rubs. In the lower extremities there is no edema. The upper extremities do not have edema.    Abdomen: Soft, benign, non-tender with no masses, hernias, organomegaly or scars.    Impression and Assessment    Hyperlipidemia.    Essential Hypertension.    Impaired Glucose Tolerance.    Plan    Hyperlipidemia Plan: The patient was instructed to exercise daily and eat a low fat diet.    Essential Hypertension Plan: The patient was instructed to continue the current medications.    Impaired Glucose " Tolerance Plan: The patient was instructed to continue the current medications.    Diagnoses and all orders for this visit:    1. Medicare annual wellness visit, subsequent (Primary)    2. Essential hypertension  -     CBC & Differential; Future  -     Comprehensive Metabolic Panel; Future  -     TSH; Future  -     POC Urinalysis Dipstick, Automated; Future  -     CBC & Differential  -     Comprehensive Metabolic Panel  -     TSH    3. Hyperlipidemia, unspecified hyperlipidemia type  -     Comprehensive Metabolic Panel; Future  -     Lipid Panel; Future  -     Comprehensive Metabolic Panel  -     Lipid Panel    4. Impaired glucose tolerance  -     Comprehensive Metabolic Panel; Future  -     Hemoglobin A1c; Future  -     POC Microalbumin; Future  -     Comprehensive Metabolic Panel  -     Hemoglobin A1c      She declined a breast exam and mammogram.    Return to Office    The patient was instructed to return for follow-up in 6 months. The patient was instructed to return sooner if the condition changes, worsens, or does not resolve.

## 2023-03-03 ENCOUNTER — TRANSCRIBE ORDERS (OUTPATIENT)
Dept: ADMINISTRATIVE | Facility: HOSPITAL | Age: 73
End: 2023-03-03
Payer: MEDICARE

## 2023-03-03 DIAGNOSIS — Z12.31 VISIT FOR SCREENING MAMMOGRAM: Primary | ICD-10-CM

## 2023-03-27 RX ORDER — DICLOFENAC SODIUM 75 MG/1
TABLET, DELAYED RELEASE ORAL
Qty: 60 TABLET | Refills: 1 | Status: SHIPPED | OUTPATIENT
Start: 2023-03-27

## 2023-03-29 ENCOUNTER — OFFICE VISIT (OUTPATIENT)
Dept: NEUROSURGERY | Facility: CLINIC | Age: 73
End: 2023-03-29
Payer: MEDICARE

## 2023-03-29 ENCOUNTER — APPOINTMENT (OUTPATIENT)
Dept: MRI IMAGING | Facility: HOSPITAL | Age: 73
End: 2023-03-29

## 2023-03-29 VITALS — HEIGHT: 57 IN | BODY MASS INDEX: 42.11 KG/M2 | WEIGHT: 195.2 LBS | RESPIRATION RATE: 17 BRPM

## 2023-03-29 DIAGNOSIS — D32.1 SPINAL MENINGIOMA: Primary | ICD-10-CM

## 2023-03-29 PROCEDURE — 99213 OFFICE O/P EST LOW 20 MIN: CPT | Performed by: NEUROLOGICAL SURGERY

## 2023-03-29 PROCEDURE — 1159F MED LIST DOCD IN RCRD: CPT | Performed by: NEUROLOGICAL SURGERY

## 2023-03-29 PROCEDURE — 1160F RVW MEDS BY RX/DR IN RCRD: CPT | Performed by: NEUROLOGICAL SURGERY

## 2023-03-29 NOTE — PROGRESS NOTES
Patient: Bharti Haines  : 1950    Primary Care Provider: Marshal Guerrero MD    Requesting Provider: As above        History    Chief Complaint: Aggressive lower extremity numbness and weakness.    History of Present Illness: Ms. Haines is a 73 old woman is well-known to my service.  She is previously undergone lumbar surgical intervention.  More recently she presented with the above-noted complaints.  Symptoms also involves her abdomen.  Studies ultimately demonstrated an intradural extramedullary lesion suggestive of meningioma.  On 10/27/2022 she underwent tumor resection via T6 and T7 laminectomies.  Pathology was consistent with a WHO grade 1 meningioma.  She is doing great.  She has modest amount of residual numbness in her feet.  Her abdominal symptoms and weakness have largely resolved.  She is ambulating independently.    Review of Systems   Constitutional: Negative for activity change, appetite change, chills, diaphoresis, fatigue, fever and unexpected weight change.   HENT: Negative for congestion, dental problem, drooling, ear discharge, ear pain, facial swelling, hearing loss, mouth sores, nosebleeds, postnasal drip, rhinorrhea, sinus pressure, sneezing, sore throat, tinnitus, trouble swallowing and voice change.    Eyes: Negative for photophobia, pain, discharge, redness, itching and visual disturbance.   Respiratory: Negative for apnea, cough, choking, chest tightness, shortness of breath, wheezing and stridor.    Cardiovascular: Negative for chest pain, palpitations and leg swelling.   Gastrointestinal: Negative for abdominal distention, abdominal pain, anal bleeding, blood in stool, constipation, diarrhea, nausea, rectal pain and vomiting.   Endocrine: Negative for cold intolerance, heat intolerance, polydipsia, polyphagia and polyuria.   Genitourinary: Negative for decreased urine volume, difficulty urinating, dysuria, enuresis, flank pain, frequency, genital sores, hematuria and  "urgency.   Musculoskeletal: Negative for arthralgias, back pain, gait problem, joint swelling, myalgias, neck pain and neck stiffness.   Skin: Negative for color change, pallor, rash and wound.   Allergic/Immunologic: Negative for environmental allergies, food allergies and immunocompromised state.   Neurological: Negative for dizziness, tremors, seizures, syncope, facial asymmetry, speech difficulty, weakness, light-headedness, numbness and headaches.   Hematological: Negative for adenopathy. Does not bruise/bleed easily.   Psychiatric/Behavioral: Negative for agitation, behavioral problems, confusion, decreased concentration, dysphoric mood, hallucinations, self-injury, sleep disturbance and suicidal ideas. The patient is not nervous/anxious and is not hyperactive.    All other systems reviewed and are negative.      The patient's past medical history, past surgical history, family history, and social history have been reviewed at length in the electronic medical record.      Physical Exam:   Resp 17   Ht 143.5 cm (56.5\")   Wt 88.5 kg (195 lb 3.2 oz)   LMP  (LMP Unknown) Comment: papsmear january 2022 by Dr. Nunez  BMI 42.99 kg/m²   The patient ambulates in a very steady independent fashion.    Medical Decision Making    Data Review:   (All imaging studies were personally reviewed unless stated otherwise)  Follow-up MRI demonstrates postsurgical changes in the mid thoracic spine.  There is no residual or recurrent tumor.    Diagnosis:   Thoracic meningioma status post resection.    Treatment Options:   Ms. Haines is doing great.  She will follow-up in my clinic in 1 year with a new MRI of the thoracic spine with and without gadolinium.       Diagnosis Plan   1. Spinal meningioma (HCC)            Scribed for Hipolito Montejo MD by Amy Wood CMA on 3/29/2023 13:38 EDT       I, Dr. Montejo, personally performed the services described in the documentation, as scribed in my presence, and it is both " accurate and complete.

## 2023-04-03 RX ORDER — GABAPENTIN 300 MG/1
CAPSULE ORAL
Qty: 90 CAPSULE | Refills: 2 | Status: SHIPPED | OUTPATIENT
Start: 2023-04-03

## 2023-05-09 ENCOUNTER — HOSPITAL ENCOUNTER (OUTPATIENT)
Dept: MAMMOGRAPHY | Facility: HOSPITAL | Age: 73
Discharge: HOME | End: 2023-05-09
Admitting: INTERNAL MEDICINE
Payer: MEDICARE

## 2023-05-09 DIAGNOSIS — Z12.31 VISIT FOR SCREENING MAMMOGRAM: ICD-10-CM

## 2023-05-09 PROCEDURE — 77067 SCR MAMMO BI INCL CAD: CPT

## 2023-05-09 PROCEDURE — 77063 BREAST TOMOSYNTHESIS BI: CPT

## 2023-05-23 ENCOUNTER — HOSPITAL ENCOUNTER (OUTPATIENT)
Dept: MAMMOGRAPHY | Facility: HOSPITAL | Age: 73
Discharge: HOME | End: 2023-05-23
Payer: MEDICARE

## 2023-05-23 DIAGNOSIS — Z12.31 VISIT FOR SCREENING MAMMOGRAM: ICD-10-CM

## 2023-08-23 RX ORDER — GABAPENTIN 300 MG/1
300 CAPSULE ORAL 3 TIMES DAILY
Qty: 90 CAPSULE | Refills: 2 | Status: SHIPPED | OUTPATIENT
Start: 2023-08-23

## 2023-09-02 RX ORDER — DICLOFENAC SODIUM 75 MG/1
TABLET, DELAYED RELEASE ORAL
Qty: 60 TABLET | Refills: 1 | Status: SHIPPED | OUTPATIENT
Start: 2023-09-02

## 2023-09-05 ENCOUNTER — OFFICE VISIT (OUTPATIENT)
Dept: INTERNAL MEDICINE | Facility: CLINIC | Age: 73
End: 2023-09-05
Payer: MEDICARE

## 2023-09-05 VITALS
SYSTOLIC BLOOD PRESSURE: 130 MMHG | TEMPERATURE: 97.3 F | HEIGHT: 57 IN | BODY MASS INDEX: 41.98 KG/M2 | WEIGHT: 194.6 LBS | RESPIRATION RATE: 20 BRPM | HEART RATE: 64 BPM | DIASTOLIC BLOOD PRESSURE: 78 MMHG

## 2023-09-05 DIAGNOSIS — E66.01 CLASS 2 SEVERE OBESITY DUE TO EXCESS CALORIES WITH SERIOUS COMORBIDITY AND BODY MASS INDEX (BMI) OF 39.0 TO 39.9 IN ADULT: Primary | ICD-10-CM

## 2023-09-05 DIAGNOSIS — M48.062 SPINAL STENOSIS, LUMBAR REGION, WITH NEUROGENIC CLAUDICATION: ICD-10-CM

## 2023-09-05 DIAGNOSIS — R73.9 HYPERGLYCEMIA: ICD-10-CM

## 2023-09-05 DIAGNOSIS — E78.5 HYPERLIPIDEMIA, UNSPECIFIED HYPERLIPIDEMIA TYPE: ICD-10-CM

## 2023-09-05 DIAGNOSIS — I10 PRIMARY HYPERTENSION: ICD-10-CM

## 2023-09-05 DIAGNOSIS — M54.16 LUMBAR RADICULOPATHY: ICD-10-CM

## 2023-09-05 LAB
ALBUMIN SERPL-MCNC: 4.5 G/DL (ref 3.5–5.2)
ALBUMIN/GLOB SERPL: 1.8 G/DL
ALP SERPL-CCNC: 77 U/L (ref 39–117)
ALT SERPL W P-5'-P-CCNC: 14 U/L (ref 1–33)
ANION GAP SERPL CALCULATED.3IONS-SCNC: 19 MMOL/L (ref 5–15)
AST SERPL-CCNC: 20 U/L (ref 1–32)
BILIRUB SERPL-MCNC: 0.7 MG/DL (ref 0–1.2)
BUN SERPL-MCNC: 16 MG/DL (ref 8–23)
BUN/CREAT SERPL: 17.8 (ref 7–25)
CALCIUM SPEC-SCNC: 9.8 MG/DL (ref 8.6–10.5)
CHLORIDE SERPL-SCNC: 95 MMOL/L (ref 98–107)
CO2 SERPL-SCNC: 23 MMOL/L (ref 22–29)
CREAT SERPL-MCNC: 0.9 MG/DL (ref 0.57–1)
EGFRCR SERPLBLD CKD-EPI 2021: 67.2 ML/MIN/1.73
GLOBULIN UR ELPH-MCNC: 2.5 GM/DL
GLUCOSE SERPL-MCNC: 107 MG/DL (ref 65–99)
HBA1C MFR BLD: 5.8 % (ref 4.8–5.6)
POTASSIUM SERPL-SCNC: 4.2 MMOL/L (ref 3.5–5.2)
PROT SERPL-MCNC: 7 G/DL (ref 6–8.5)
SODIUM SERPL-SCNC: 137 MMOL/L (ref 136–145)

## 2023-09-05 PROCEDURE — 83036 HEMOGLOBIN GLYCOSYLATED A1C: CPT | Performed by: INTERNAL MEDICINE

## 2023-09-05 PROCEDURE — 80053 COMPREHEN METABOLIC PANEL: CPT | Performed by: INTERNAL MEDICINE

## 2023-09-05 NOTE — PROGRESS NOTES
Chief Complaint   Patient presents with    Hyperlipidemia    Hypertension       History of Present Illness    Patient is doing well. She had a small cold earlier this week, did not run a fever, symptoms have since resolved.     The patient presents for a follow-up related to hyperlipidemia. She is following a low fat diet, but admittedly enjoys to eat her sweets. She is not exercising frequently, she tries to walk to her mailbox each day. She is not taking medication for hyperlipidemia, she has tried multiple previously and has not tolerated them. She denies chest pain, shortness of breath, orthopnea, paroxysmal nocturnal dyspnea, dyspnea on exertion, edema, palpitations or syncope.    The patient presents for a follow-up related to hypertension. The patient reports that she has had no headaches or blurred vision. She states that she is taking her medication as prescribed. She is not having medication side effects.    The patient presents for a follow-up related to impaired glucose tolerance. She does not check her blood sugars at home. She denies hypoglycemic symptoms. The patient denies polyuria, polydypsia or polyphagia. She reports no symptoms of neuropathy. The patient is not on medication for her impaired glucose tolerance.     She is not exercising very much. Her last back surgery was in October of 2022. She had started walking to the mailbox. She has previously tried physical therapy, is not interested in trying again at this time. She takes gabapentin 2-3x/week and feels that this helps with her pain. Her pain is unchanged from its baseline.     Medications      Current Outpatient Medications:     aspirin tablet, Take 81 mg by mouth daily., Disp: , Rfl:     diclofenac (VOLTAREN) 75 MG EC tablet, TAKE ONE TABLET BY MOUTH TWICE A DAY AS NEEDED, Disp: 60 tablet, Rfl: 1    gabapentin (NEURONTIN) 300 MG capsule, Take 1 capsule by mouth 3 (Three) Times a Day., Disp: 90 capsule, Rfl: 2     Allergies    Allergies  "  Allergen Reactions    Cefdinir Rash and Other (See Comments)    Macrolides And Ketolides Rash    Penicillins Rash    Percocet [Oxycodone-Acetaminophen] Rash    Prilosec [Omeprazole] Myalgia    Zofran [Ondansetron Hcl] Itching       Problem List    Patient Active Problem List   Diagnosis    Arthritis    Hyperlipidemia    Hyperglycemia    Hypertension    Class 2 severe obesity due to excess calories with serious comorbidity and body mass index (BMI) of 39.0 to 39.9 in adult    Muscular aches    Venous insufficiency    Osteoarthritis    Low back pain    Arthralgia    Lumbar radiculopathy    Spinal stenosis, lumbar region, with neurogenic claudication    Encounter for pre-operative examination    Spinal meningioma       Medications, Allergies, Problems List and Past History were reviewed and updated.    Physical Examination    /78   Pulse 64   Temp 97.3 °F (36.3 °C) (Infrared)   Resp 20   Ht 143.5 cm (56.5\")   Wt 88.3 kg (194 lb 9.6 oz)   LMP  (LMP Unknown) Comment: papsmear january 2022 by Dr. Nunez  BMI 42.86 kg/m²     HEENT: Facies- Within normal limits. Lids- Normal bilaterally. Conjunctiva- Clear bilaterally. Sclera- Anicteric bilaterally.    Neck: Thyroid- non enlarged, symmetric and has no nodules. No bruits are detected.    Lungs: Auscultation- Clear to auscultation bilaterally. There are no retractions, clubbing or cyanosis. The Expiratory to Inspiratory ratio is equal.    Cardiovascular: There are no carotid bruits. Heart- Normal Rate with Regular rhythm and no murmurs. There are no gallops. There are no rubs. In the lower extremities there is no edema. The upper extremities do not have edema.    Skin: Small varicose veins bilaterally on lower extremities. Multiple skin tags present on neck.     Abdomen: Soft, benign, non-tender with no masses, hernias, organomegaly or scars.    Impression and Assessment    Hyperlipidemia.    Essential Hypertension.    Impaired Glucose " Tolerance.    Plan    Hyperlipidemia Plan: The patient was instructed to exercise daily and eat a low fat diet. She has tried multiple cholesterol lowering medications previously and has not tolerated them well.     Essential Hypertension Plan: The patient was instructed to continue the current medications. Blood pressure is 130/78, controlled. Continue medication, no refills needed today.     Impaired Glucose Tolerance Plan: The patient was instructed to continue the current medications.    Lumbar Radiculopathy: patient with chronic pain, unchanged from baseline. Gabapentin will help with pain, refill last sent in August 2023, not needed today.     Diagnoses and all orders for this visit:    1. Class 2 severe obesity due to excess calories with serious comorbidity and body mass index (BMI) of 39.0 to 39.9 in adult (Primary)    2. Hyperglycemia  -     Comprehensive Metabolic Panel; Future  -     Hemoglobin A1c; Future    3. Primary hypertension    4. Hyperlipidemia, unspecified hyperlipidemia type  -     Comprehensive Metabolic Panel; Future    5. Lumbar radiculopathy    6. Spinal stenosis, lumbar region, with neurogenic claudication        HCM:   Last Mammogram was in 5/2023 and was BiRADS 2. Repeat in 1 year.   Patient counseled to get flu shot this fall.   Last Colonoscopy in 2022.   Patient counseled to receive covid booster this fall, but patient declined after side effects experienced after initial doses.     Return to Office    The patient was instructed to return for follow-up in 6 months. The patient was instructed to return sooner if the condition changes, worsens, or does not resolve.    Madison Dressler, MD   Internal Medicine and Pediatrics, PGY-3.    Attending Note:   Patient was personally seen and examined by me.  I have obtained and corroborated the history and examination as documented above, and agree with the accuracy of the documented information.  All orders were reviewed and personally signed  by me.

## 2023-09-25 ENCOUNTER — TELEPHONE (OUTPATIENT)
Dept: INTERNAL MEDICINE | Facility: CLINIC | Age: 73
End: 2023-09-25
Payer: MEDICARE

## 2023-09-25 NOTE — TELEPHONE ENCOUNTER
Caller: Bharti Haines    Relationship: Self    Best call back number: [phone]     What test was performed: LAB WORK A1C     When was the test performed: 9.5.23    Where was the test performed: [facility]     Additional notes: PLEASE CALL BACK WITH RESULTS, NO VOICEMAIL.

## 2023-09-26 NOTE — TELEPHONE ENCOUNTER
Her A1c was stable and only minimally elevated at 5.8.  Marshal Guerrero MD  07:27 EDT  09/26/23

## 2023-09-26 NOTE — TELEPHONE ENCOUNTER
Name: Bharti Haines  Relationship: Self  Best Callback Number: [phone]  HUB PROVIDED THE RELAY MESSAGE FROM THE OFFICE  PATIENT EXPRESSED UNDERSTANDING AND SAID THANK YOU.

## 2023-09-26 NOTE — TELEPHONE ENCOUNTER
Tried reaching Pt, mail box is full.    Hub please relay message  Her A1c was stable and only minimally elevated at 5.8.  Marshal Guerrero MD  07:27 EDT  09/26/23

## 2023-12-18 RX ORDER — DICLOFENAC SODIUM 75 MG/1
75 TABLET, DELAYED RELEASE ORAL 2 TIMES DAILY PRN
Qty: 60 TABLET | Refills: 1 | Status: SHIPPED | OUTPATIENT
Start: 2023-12-18

## 2024-02-27 ENCOUNTER — TRANSCRIBE ORDERS (OUTPATIENT)
Dept: INTERNAL MEDICINE | Facility: CLINIC | Age: 74
End: 2024-02-27
Payer: MEDICARE

## 2024-02-27 DIAGNOSIS — Z12.31 VISIT FOR SCREENING MAMMOGRAM: Primary | ICD-10-CM

## 2024-03-05 ENCOUNTER — OFFICE VISIT (OUTPATIENT)
Dept: INTERNAL MEDICINE | Facility: CLINIC | Age: 74
End: 2024-03-05
Payer: MEDICARE

## 2024-03-05 VITALS
HEIGHT: 57 IN | RESPIRATION RATE: 21 BRPM | HEART RATE: 68 BPM | SYSTOLIC BLOOD PRESSURE: 134 MMHG | BODY MASS INDEX: 42.5 KG/M2 | WEIGHT: 197 LBS | DIASTOLIC BLOOD PRESSURE: 82 MMHG

## 2024-03-05 DIAGNOSIS — E78.5 HYPERLIPIDEMIA, UNSPECIFIED HYPERLIPIDEMIA TYPE: ICD-10-CM

## 2024-03-05 DIAGNOSIS — R82.998 LEUKOCYTES IN URINE: ICD-10-CM

## 2024-03-05 DIAGNOSIS — R73.02 IMPAIRED GLUCOSE TOLERANCE: ICD-10-CM

## 2024-03-05 DIAGNOSIS — Z00.00 MEDICARE ANNUAL WELLNESS VISIT, SUBSEQUENT: Primary | ICD-10-CM

## 2024-03-05 DIAGNOSIS — I10 ESSENTIAL HYPERTENSION: ICD-10-CM

## 2024-03-05 LAB
ALBUMIN SERPL-MCNC: 4.3 G/DL (ref 3.5–5.2)
ALBUMIN/CREATININE RATIO, URINE: NORMAL
ALBUMIN/GLOB SERPL: 1.5 G/DL
ALP SERPL-CCNC: 70 U/L (ref 39–117)
ALT SERPL W P-5'-P-CCNC: 15 U/L (ref 1–33)
ANION GAP SERPL CALCULATED.3IONS-SCNC: 14.3 MMOL/L (ref 5–15)
AST SERPL-CCNC: 21 U/L (ref 1–32)
BASOPHILS # BLD AUTO: 0.04 10*3/MM3 (ref 0–0.2)
BASOPHILS NFR BLD AUTO: 0.6 % (ref 0–1.5)
BILIRUB BLD-MCNC: NEGATIVE MG/DL
BILIRUB SERPL-MCNC: 0.9 MG/DL (ref 0–1.2)
BUN SERPL-MCNC: 16 MG/DL (ref 8–23)
BUN/CREAT SERPL: 16.3 (ref 7–25)
CALCIUM SPEC-SCNC: 9.1 MG/DL (ref 8.6–10.5)
CHLORIDE SERPL-SCNC: 98 MMOL/L (ref 98–107)
CHOLEST SERPL-MCNC: 234 MG/DL (ref 0–200)
CLARITY, POC: CLEAR
CO2 SERPL-SCNC: 23.7 MMOL/L (ref 22–29)
COLOR UR: YELLOW
CREAT SERPL-MCNC: 0.98 MG/DL (ref 0.57–1)
DEPRECATED RDW RBC AUTO: 41.1 FL (ref 37–54)
EGFRCR SERPLBLD CKD-EPI 2021: 60.3 ML/MIN/1.73
EOSINOPHIL # BLD AUTO: 0.06 10*3/MM3 (ref 0–0.4)
EOSINOPHIL NFR BLD AUTO: 0.9 % (ref 0.3–6.2)
ERYTHROCYTE [DISTWIDTH] IN BLOOD BY AUTOMATED COUNT: 13 % (ref 12.3–15.4)
EXPIRATION DATE: ABNORMAL
EXPIRATION DATE: NORMAL
GLOBULIN UR ELPH-MCNC: 2.8 GM/DL
GLUCOSE SERPL-MCNC: 102 MG/DL (ref 65–99)
GLUCOSE UR STRIP-MCNC: NEGATIVE MG/DL
HBA1C MFR BLD: 5.8 % (ref 4.8–5.6)
HCT VFR BLD AUTO: 40.6 % (ref 34–46.6)
HDLC SERPL-MCNC: 51 MG/DL (ref 40–60)
HGB BLD-MCNC: 14.3 G/DL (ref 12–15.9)
IMM GRANULOCYTES # BLD AUTO: 0.01 10*3/MM3 (ref 0–0.05)
IMM GRANULOCYTES NFR BLD AUTO: 0.2 % (ref 0–0.5)
KETONES UR QL: NEGATIVE
LDLC SERPL CALC-MCNC: 160 MG/DL (ref 0–100)
LDLC/HDLC SERPL: 3.09 {RATIO}
LEUKOCYTE EST, POC: ABNORMAL
LYMPHOCYTES # BLD AUTO: 1.61 10*3/MM3 (ref 0.7–3.1)
LYMPHOCYTES NFR BLD AUTO: 25.3 % (ref 19.6–45.3)
Lab: ABNORMAL
Lab: NORMAL
MCH RBC QN AUTO: 30.8 PG (ref 26.6–33)
MCHC RBC AUTO-ENTMCNC: 35.2 G/DL (ref 31.5–35.7)
MCV RBC AUTO: 87.5 FL (ref 79–97)
MONOCYTES # BLD AUTO: 0.59 10*3/MM3 (ref 0.1–0.9)
MONOCYTES NFR BLD AUTO: 9.3 % (ref 5–12)
NEUTROPHILS NFR BLD AUTO: 4.05 10*3/MM3 (ref 1.7–7)
NEUTROPHILS NFR BLD AUTO: 63.7 % (ref 42.7–76)
NITRITE UR-MCNC: NEGATIVE MG/ML
NRBC BLD AUTO-RTO: 0 /100 WBC (ref 0–0.2)
PH UR: 6 [PH] (ref 5–8)
PLATELET # BLD AUTO: 250 10*3/MM3 (ref 140–450)
PMV BLD AUTO: 10 FL (ref 6–12)
POC CREATININE URINE: 50
POC MICROALBUMIN URINE: 30
POTASSIUM SERPL-SCNC: 4.5 MMOL/L (ref 3.5–5.2)
PROT SERPL-MCNC: 7.1 G/DL (ref 6–8.5)
PROT UR STRIP-MCNC: NEGATIVE MG/DL
RBC # BLD AUTO: 4.64 10*6/MM3 (ref 3.77–5.28)
RBC # UR STRIP: NEGATIVE /UL
SODIUM SERPL-SCNC: 136 MMOL/L (ref 136–145)
SP GR UR: 1.01 (ref 1–1.03)
TRIGL SERPL-MCNC: 128 MG/DL (ref 0–150)
TSH SERPL DL<=0.05 MIU/L-ACNC: 0.83 UIU/ML (ref 0.27–4.2)
UROBILINOGEN UR QL: NORMAL
VLDLC SERPL-MCNC: 23 MG/DL (ref 5–40)
WBC NRBC COR # BLD AUTO: 6.36 10*3/MM3 (ref 3.4–10.8)

## 2024-03-05 PROCEDURE — 84443 ASSAY THYROID STIM HORMONE: CPT | Performed by: INTERNAL MEDICINE

## 2024-03-05 PROCEDURE — 80053 COMPREHEN METABOLIC PANEL: CPT | Performed by: INTERNAL MEDICINE

## 2024-03-05 PROCEDURE — 85025 COMPLETE CBC W/AUTO DIFF WBC: CPT | Performed by: INTERNAL MEDICINE

## 2024-03-05 PROCEDURE — 83036 HEMOGLOBIN GLYCOSYLATED A1C: CPT | Performed by: INTERNAL MEDICINE

## 2024-03-05 PROCEDURE — 87086 URINE CULTURE/COLONY COUNT: CPT | Performed by: INTERNAL MEDICINE

## 2024-03-05 PROCEDURE — 80061 LIPID PANEL: CPT | Performed by: INTERNAL MEDICINE

## 2024-03-05 NOTE — PATIENT INSTRUCTIONS
Medicare Wellness  Personal Prevention Plan of Service     Date of Office Visit:    Encounter Provider:  Marshal Guerrero MD  Place of Service:  [facility]  Patient Name: Bharti Haines  :  1950    As part of the Medicare Wellness portion of your visit today, we are providing you with this personalized preventive plan of services (PPPS). This plan is based upon recommendations of the [place] Preventive Services Task Force (USPSTF) and the Advisory Committee on Immunization Practices (ACIP).    This lists the preventive care services that should be considered, and provides dates of when you are due. Items listed as completed are up-to-date and do not require any further intervention.    Health Maintenance   Topic Date Due    DXA SCAN  Never done    RSV Vaccine - Adults (1 - 1-dose 60+ series) Never done    ZOSTER VACCINE (2 of 3) 2014    INFLUENZA VACCINE  2023    COVID-19 Vaccine (4 - -24 season) 2023    LIPID PANEL  2024    TDAP/TD VACCINES (2 - Td or Tdap) 2024    BMI FOLLOWUP  2024    ANNUAL WELLNESS VISIT  2025    COLORECTAL CANCER SCREENING  2027    HEPATITIS C SCREENING  Completed    Pneumococcal Vaccine 65+  Completed       Orders Placed This Encounter   Procedures    Comprehensive Metabolic Panel     Standing Status:   Future     Standing Expiration Date:   3/6/2025     Order Specific Question:   Release to patient     Answer:   Routine Release [[id number]]    Hemoglobin A1c     Standing Status:   Future     Standing Expiration Date:   3/6/2025     Order Specific Question:   Release to patient     Answer:   Routine Release [[id number]]    TSH     Standing Status:   Future     Standing Expiration Date:   3/6/2025     Order Specific Question:   Release to patient     Answer:   Routine Release [[id number]]    Lipid Panel     Standing Status:   Future     Standing Expiration Date:   3/6/2025      Order Specific Question:   Release to patient     Answer:   Routine Release [[id number]]    POC Urinalysis Dipstick, Automated     Standing Status:   Future     Order Specific Question:   Release to patient     Answer:   Routine Release [[id number]]    CBC & Differential     Standing Status:   Future     Standing Expiration Date:   3/6/2025     Order Specific Question:   Manual Differential     Answer:   No     Order Specific Question:   Release to patient     Answer:   Routine Release [[id number]]       Return in about 6 months (around 9/5/2024) for Follow-up.          Advance Care Planning and Advance Directives     You make decisions on a daily basis - decisions about where you want to live, your career, your home, your life. Perhaps one of the most important decisions you face is your choice for future medical care. Take time to talk with your family and your healthcare team and start planning today.  Advance Care Planning is a process that can help you:  Understand possible future healthcare decisions in light of your own experiences  Reflect on those decision in light of your goals and values  Discuss your decisions with those closest to you and the healthcare professionals that care for you  Make a plan by creating a document that reflects your wishes    Surrogate Decision Maker  In the event of a medical emergency, which has left you unable to communicate or to make your own decisions, you would need someone to make decisions for you.  It is important to discuss your preferences for medical treatment with this person while you are in good health.     Qualities of a surrogate decision maker:  Willing to take on this role and responsibility  Knows what you want for future medical care  Willing to follow your wishes even if they don't agree with them  Able to make difficult medical decisions under stressful circumstances    Advance Directives  These are legal documents you can create that will guide your  healthcare team and decision maker(s) when needed. These documents can be stored in the electronic medical record.    Living Will - a legal document to guide your care if you have a terminal condition or a serious illness and are unable to communicate. States vary by statute in document names/types, but most forms may include one or more of the following:        -  Directions regarding life-prolonging treatments        -  Directions regarding artificially provided nutrition/hydration        -  Choosing a healthcare decision maker        -  Direction regarding organ/tissue donation    Durable Power of  for Healthcare - this document names an -in-fact to make medical decisions for you, but it may also allow this person to make personal and financial decisions for you. Please seek the advice of an  if you need this type of document.    **Advance Directives are not required and no one may discriminate against you if you do not sign one.    Medical Orders  Many states allow specific forms/orders signed by your physician to record your wishes for medical treatment in your current state of health. This form, signed in personal communication with your physician, addresses resuscitation and other medical interventions that you may or may not want.      For more information or to schedule a time with a [facility] Advance Care Planning Facilitator contact: [facility]/ACP or call [phone] and someone will contact you directly.

## 2024-03-05 NOTE — PROGRESS NOTES
The ABCs of the Annual Wellness Visit  Subsequent Medicare Wellness Visit    Subjective      Bharti Haines is a 74 y.o. female who presents for a Subsequent Medicare Wellness Visit.    The following portions of the patient's history were reviewed and   updated as appropriate: allergies, current medications, past family history, past medical history, past social history, past surgical history, and problem list.    Compared to one year ago, the patient feels her physical   health is the same.    Compared to one year ago, the patient feels her mental   health is the same.    Recent Hospitalizations:  She was not admitted to the hospital during the last year.       Current Medical Providers:  Patient Care Team:  Marshal Guerrero MD as PCP - General (Internal Medicine)  Chucho Pollard MD as Consulting Physician (Anesthesiology)    Outpatient Medications Prior to Visit   Medication Sig Dispense Refill    aspirin tablet Take 81 mg by mouth daily.      diclofenac (VOLTAREN) 75 MG EC tablet Take 1 tablet by mouth 2 (Two) Times a Day As Needed (TAKE ONE TABLET BY MOUTH TWICE A DAY AS NEEDED). 60 tablet 1    gabapentin (NEURONTIN) 300 MG capsule Take 1 capsule by mouth 3 (Three) Times a Day. 90 capsule 2     No facility-administered medications prior to visit.       No opioid medication identified on active medication list. I have reviewed chart for other potential  high risk medication/s and harmful drug interactions in the elderly.        Aspirin is on active medication list. Aspirin use is indicated based on review of current medical condition/s. Pros and cons of this therapy have been discussed today. Benefits of this medication outweigh potential harm.  Patient has been encouraged to continue taking this medication.  .      Patient Active Problem List   Diagnosis    Arthritis    Hyperlipidemia    Hyperglycemia    Hypertension    Class 2 severe obesity due to excess calories with serious comorbidity and body mass  "index (BMI) of 39.0 to 39.9 in adult    Muscular aches    Venous insufficiency    Osteoarthritis    Low back pain    Arthralgia    Lumbar radiculopathy    Spinal stenosis, lumbar region, with neurogenic claudication    Encounter for pre-operative examination    Spinal meningioma     Advance Care Planning   Advance Care Planning     Advance Directive is not on file.  ACP discussion was held with the patient during this visit. Patient has an advance directive (not in EMR), copy requested.     Objective    Vitals:    03/05/24 0915   BP: 134/82   Pulse: 68   Resp: 21   Weight: 89.4 kg (197 lb)   Height: 143.5 cm (56.5\")   PainSc: 0-No pain     Estimated body mass index is 43.39 kg/m² as calculated from the following:    Height as of this encounter: 143.5 cm (56.5\").    Weight as of this encounter: 89.4 kg (197 lb).           Does the patient have evidence of cognitive impairment?   No     Finger Rub Hearing{Test (right ear):passed  Finger Rub Hearing{Test (left ear):passed         HEALTH RISK ASSESSMENT    Smoking Status:  Social History     Tobacco Use   Smoking Status Never   Smokeless Tobacco Never     Alcohol Consumption:  Social History     Substance and Sexual Activity   Alcohol Use No     Fall Risk Screen:    STEADI Fall Risk Assessment was completed, and patient is at LOW risk for falls.Assessment completed on:3/5/2024    Depression Screening:      3/5/2024     9:22 AM   PHQ-2/PHQ-9 Depression Screening   Little Interest or Pleasure in Doing Things 0-->not at all   Feeling Down, Depressed or Hopeless 0-->not at all   PHQ-9: Brief Depression Severity Measure Score 0       Health Habits and Functional and Cognitive Screening:      3/5/2024     9:21 AM   Functional & Cognitive Status   Do you have difficulty preparing food and eating? No   Do you have difficulty bathing yourself, getting dressed or grooming yourself? No   Do you have difficulty using the toilet? No   Do you have difficulty moving around from place " to place? No   Do you have trouble with steps or getting out of a bed or a chair? No   Current Diet Frequent Junk Food   Dental Exam Up to date   Eye Exam Up to date   Exercise (times per week) 0 times per week   Current Exercises Include No Regular Exercise   Do you need help using the phone?  No   Are you deaf or do you have serious difficulty hearing?  No   Do you need help to go to places out of walking distance? No   Do you need help shopping? No   Do you need help preparing meals?  No   Do you need help with housework?  No   Do you need help with laundry? No   Do you need help taking your medications? No   Do you need help managing money? No   Do you ever drive or ride in a car without wearing a seat belt? No   Have you felt unusual stress, anger or loneliness in the last month? No   Who do you live with? Spouse   If you need help, do you have trouble finding someone available to you? No   Have you been bothered in the last four weeks by sexual problems? No   Do you have difficulty concentrating, remembering or making decisions? No       Age-appropriate Screening Schedule:  Refer to the list below for future screening recommendations based on patient's age, sex and/or medical conditions. Orders for these recommended tests are listed in the plan section. The patient has been provided with a written plan.    Health Maintenance   Topic Date Due    DXA SCAN  Never done    RSV Vaccine - Adults (1 - 1-dose 60+ series) Never done    ZOSTER VACCINE (2 of 3) 07/18/2014    INFLUENZA VACCINE  08/01/2023    COVID-19 Vaccine (4 - 2023-24 season) 09/01/2023    LIPID PANEL  03/01/2024    TDAP/TD VACCINES (2 - Td or Tdap) 05/23/2024    BMI FOLLOWUP  09/05/2024    ANNUAL WELLNESS VISIT  03/05/2025    COLORECTAL CANCER SCREENING  01/21/2027    HEPATITIS C SCREENING  Completed    Pneumococcal Vaccine 65+  Completed                  CMS Preventative Services Quick Reference  Risk Factors Identified During  Encounter:    Immunizations Discussed/Encouraged: Influenza and RSV (Respiratory Syncytial Virus)  Inactivity/Sedentary: Patient was advised to exercise at least 150 minutes a week per CDC recommendations.    The above risks/problems have been discussed with the patient.  Pertinent information has been shared with the patient in the After Visit Summary.    Diagnoses and all orders for this visit:    1. Medicare annual wellness visit, subsequent (Primary)    2. Essential hypertension  -     CBC & Differential; Future  -     Comprehensive Metabolic Panel; Future  -     TSH; Future  -     POC Urinalysis Dipstick, Automated; Future    3. Hyperlipidemia, unspecified hyperlipidemia type  -     Comprehensive Metabolic Panel; Future  -     Lipid Panel; Future    4. Impaired glucose tolerance  -     Comprehensive Metabolic Panel; Future  -     Hemoglobin A1c; Future  -     POC Microalbumin; Future          Follow Up:   Next Medicare Wellness visit to be scheduled in 1 year.      An After Visit Summary and PPPS were made available to the patient.

## 2024-03-05 NOTE — PROGRESS NOTES
Chief Complaint   Patient presents with    Subsequent Medicare Wellness examination        History of Present Illness      The patient presents for a follow-up related to hypertension. The patient reports that she has had no headaches, chest pain, dyspnea, edema, syncope, blurred vision or palpitations. She states that she is taking her medication as prescribed. She is not having medication side effects.    The patient presents for a follow-up related to hyperlipidemia. She is not following a low fat diet. She reports that she is not exercising. She is not taking medication for hyperlipidemia. She denies orthopnea, paroxysmal nocturnal dyspnea or dyspnea on exertion.    The patient presents for a follow-up related to impaired glucose tolerance. She does not check her blood sugars at home. She denies hypoglycemic symptoms. The patient denies polyuria, polydypsia or polyphagia. She reports no symptoms of neuropathy. The patient is not on medication for her impaired glucose tolerance. The patient does check her feet daily at home.    Medications      Current Outpatient Medications:     aspirin tablet, Take 81 mg by mouth daily., Disp: , Rfl:     diclofenac (VOLTAREN) 75 MG EC tablet, Take 1 tablet by mouth 2 (Two) Times a Day As Needed (TAKE ONE TABLET BY MOUTH TWICE A DAY AS NEEDED)., Disp: 60 tablet, Rfl: 1    gabapentin (NEURONTIN) 300 MG capsule, Take 1 capsule by mouth 3 (Three) Times a Day., Disp: 90 capsule, Rfl: 2     Allergies    Allergies   Allergen Reactions    Cefdinir Rash and Other (See Comments)    Macrolides And Ketolides Rash    Penicillins Rash    Percocet [Oxycodone-Acetaminophen] Rash    Prilosec [Omeprazole] Myalgia    Zofran [Ondansetron Hcl] Itching       Problem List    Patient Active Problem List   Diagnosis    Arthritis    Hyperlipidemia    Hyperglycemia    Hypertension    Class 2 severe obesity due to excess calories with serious comorbidity and body mass index (BMI) of 39.0 to 39.9 in adult  "   Muscular aches    Venous insufficiency    Osteoarthritis    Low back pain    Arthralgia    Lumbar radiculopathy    Spinal stenosis, lumbar region, with neurogenic claudication    Encounter for pre-operative examination    Spinal meningioma       Medications, Allergies, Problems List and Past History were reviewed and updated.    Physical Examination    /82   Pulse 68   Resp 21   Ht 143.5 cm (56.5\")   Wt 89.4 kg (197 lb)   LMP  (LMP Unknown) Comment: papsmear january 2022 by Dr. Nunez  BMI 43.39 kg/m²       HEENT: Head- Normocephalic Atraumatic. Facies- Within normal limits. Pinnas- Normal texture and shape bilaterally. Canals- Normal bilaterally. TMs- Normal bilaterally. Nares- Patent bilaterally. Nasal Septum- is normal. There is no tenderness to palpation over the frontal or maxillary sinuses. Lids- Normal bilaterally. Conjunctiva- Clear bilaterally. Sclera- Anicteric bilaterally. Oropharynx- Moist with no lesions. Tonsils- No enlargement, erythema or exudate.    Neck: Thyroid- non enlarged, symmetric and has no nodules. No bruits are detected. ROM- Normal Range of Motion with no rigidity.    Lungs: Auscultation- Clear to auscultation bilaterally. There are no retractions, clubbing or cyanosis. The Expiratory to Inspiratory ratio is equal.    Cardiovascular: There are no carotid bruits. Heart- Normal Rate with Regular rhythm and no murmurs. There are no gallops. There are no rubs. In the lower extremities there is no edema. The upper extremities do not have edema.    Abdomen: Soft, benign, non-tender with no masses, hernias, organomegaly or scars.    Breast- Deferred. Patient declined exam.    Impression and Assessment    Essential Hypertension.    Hyperlipidemia.    Impaired Glucose Tolerance.    Plan    Essential Hypertension Plan: The condition is stable. No change was made in the current plan.    Hyperlipidemia Plan: The patient was instructed to exercise daily and eat a low fat " diet.    Impaired Glucose Tolerance Plan: Further plans will be formulated after test results are reviewed.    Diagnoses and all orders for this visit:    1. Medicare annual wellness visit, subsequent (Primary)    2. Essential hypertension  -     CBC & Differential; Future  -     Comprehensive Metabolic Panel; Future  -     TSH; Future  -     POC Urinalysis Dipstick, Automated; Future  -     CBC & Differential  -     Comprehensive Metabolic Panel  -     TSH    3. Hyperlipidemia, unspecified hyperlipidemia type  -     Comprehensive Metabolic Panel; Future  -     Lipid Panel; Future  -     Comprehensive Metabolic Panel  -     Lipid Panel    4. Impaired glucose tolerance  -     Comprehensive Metabolic Panel; Future  -     Hemoglobin A1c; Future  -     POC Microalbumin; Future  -     Comprehensive Metabolic Panel  -     Hemoglobin A1c        Return to Office    The patient was instructed to return for follow-up in 6 months. The patient was instructed to return sooner if the condition changes, worsens, or does not resolve.

## 2024-03-07 LAB — BACTERIA SPEC AEROBE CULT: NO GROWTH

## 2024-03-22 ENCOUNTER — OFFICE VISIT (OUTPATIENT)
Dept: NEUROSURGERY | Facility: CLINIC | Age: 74
End: 2024-03-22
Payer: MEDICARE

## 2024-03-22 VITALS — TEMPERATURE: 97.5 F | HEIGHT: 56 IN | WEIGHT: 198 LBS | BODY MASS INDEX: 44.54 KG/M2

## 2024-03-22 DIAGNOSIS — M54.16 LUMBAR RADICULOPATHY: ICD-10-CM

## 2024-03-22 DIAGNOSIS — D32.1 SPINAL MENINGIOMA: Primary | ICD-10-CM

## 2024-03-22 PROCEDURE — 99214 OFFICE O/P EST MOD 30 MIN: CPT | Performed by: NEUROLOGICAL SURGERY

## 2024-03-22 PROCEDURE — 1159F MED LIST DOCD IN RCRD: CPT | Performed by: NEUROLOGICAL SURGERY

## 2024-03-22 PROCEDURE — 1160F RVW MEDS BY RX/DR IN RCRD: CPT | Performed by: NEUROLOGICAL SURGERY

## 2024-03-22 RX ORDER — LOSARTAN POTASSIUM 50 MG/1
50 TABLET ORAL DAILY
COMMUNITY

## 2024-03-22 NOTE — PROGRESS NOTES
Patient: Bharti Haines  : 1950    Primary Care Provider: Marshal Guerrero MD    Requesting Provider: As above        History    Chief Complaint: Progressive lower extremity numbness and weakness.    History of Present Illness: Ms. Haines is a 74-year-old woman is well-known to my service.  She is previously undergone lumbar surgical intervention.  More recently she presented with the above-noted complaints.  Symptoms also involves her abdomen.  Studies ultimately demonstrated an intradural extramedullary lesion suggestive of meningioma.  On 10/27/2022 she underwent tumor resection via T6 and T7 laminectomies.  Pathology was consistent with a WHO grade 1 meningioma.  Her main complaint now is low back pain that extends into both legs, more so on the right.  Her symptoms are not particularly positional at this point.  She is actually worse at night.  She has to get out of bed and sleep on the couch during the middle of the night.  In  I performed a left L4-5 discectomy.    Review of Systems   Constitutional:  Negative for activity change, appetite change, chills, diaphoresis, fatigue, fever and unexpected weight change.   HENT:  Negative for congestion, dental problem, drooling, ear discharge, ear pain, facial swelling, hearing loss, mouth sores, nosebleeds, postnasal drip, rhinorrhea, sinus pressure, sinus pain, sneezing, sore throat, tinnitus, trouble swallowing and voice change.    Eyes:  Negative for photophobia, pain, discharge, redness, itching and visual disturbance.   Respiratory:  Negative for apnea, cough, choking, chest tightness, shortness of breath, wheezing and stridor.    Cardiovascular:  Negative for chest pain, palpitations and leg swelling.   Gastrointestinal:  Negative for abdominal distention, abdominal pain, anal bleeding, blood in stool, constipation, diarrhea, nausea, rectal pain and vomiting.   Endocrine: Negative for cold intolerance, heat intolerance, polydipsia, polyphagia  "and polyuria.   Genitourinary:  Negative for decreased urine volume, difficulty urinating, dyspareunia, dysuria, enuresis, flank pain, frequency, genital sores, hematuria, menstrual problem, pelvic pain, urgency, vaginal bleeding, vaginal discharge and vaginal pain.   Musculoskeletal:  Positive for back pain. Negative for arthralgias, gait problem, joint swelling, myalgias, neck pain and neck stiffness.   Skin:  Negative for color change, pallor, rash and wound.   Allergic/Immunologic: Negative for environmental allergies, food allergies and immunocompromised state.   Neurological:  Negative for dizziness, tremors, seizures, syncope, facial asymmetry, speech difficulty, weakness, light-headedness, numbness and headaches.   Hematological:  Negative for adenopathy. Does not bruise/bleed easily.   Psychiatric/Behavioral:  Negative for agitation, behavioral problems, confusion, decreased concentration, dysphoric mood, hallucinations, self-injury, sleep disturbance and suicidal ideas. The patient is not nervous/anxious and is not hyperactive.      The patient's past medical history, past surgical history, family history, and social history have been reviewed at length in the electronic medical record.      Physical Exam:   Temp 97.5 °F (36.4 °C) (Infrared)   Ht 143.5 cm (56.5\")   Wt 89.8 kg (198 lb)   LMP  (LMP Unknown) Comment: papsmear january 2022 by Dr. Nunez  BMI 43.61 kg/m²   Straight leg raising is negative.  Lower extremity reflexes are difficult to elicit throughout.    Medical Decision Making    Data Review:   (All imaging studies were personally reviewed unless stated otherwise)  MRI of the thoracic spine demonstrates postsurgical changes in the thoracic spine.  There is no residual or recurrent tumor.    Diagnosis:   1.  Thoracic meningioma status post laminectomy for resection.  2.  Mechanical low back pain with a radicular component.    Treatment Options:   I have ordered an MRI of her lumbar spine " with and without gadolinium.  Based on her complaints she may require some injections.  I am going to obtain a follow-up MRI of her thoracic spine with and without gadolinium in 1.5 years.      Scribed for Hipolito Montejo MD by Madisyn Vallejo MA on 3/22/2024 11:29 EDT      I, Dr. Montejo, personally performed the services described in the documentation, as scribed in my presence, and it is both accurate and complete.

## 2024-04-05 ENCOUNTER — OFFICE VISIT (OUTPATIENT)
Dept: NEUROSURGERY | Facility: CLINIC | Age: 74
End: 2024-04-05
Payer: MEDICARE

## 2024-04-05 VITALS — WEIGHT: 199.5 LBS | BODY MASS INDEX: 43.04 KG/M2 | TEMPERATURE: 97.8 F | HEIGHT: 57 IN

## 2024-04-05 DIAGNOSIS — M54.9 MECHANICAL BACK PAIN: Primary | ICD-10-CM

## 2024-04-05 DIAGNOSIS — M47.819 FACET ARTHROPATHY: ICD-10-CM

## 2024-04-05 DIAGNOSIS — M51.36 DDD (DEGENERATIVE DISC DISEASE), LUMBAR: ICD-10-CM

## 2024-04-05 DIAGNOSIS — D32.1 SPINAL MENINGIOMA: ICD-10-CM

## 2024-04-05 DIAGNOSIS — M48.061 SPINAL STENOSIS OF LUMBAR REGION WITHOUT NEUROGENIC CLAUDICATION: ICD-10-CM

## 2024-04-05 PROCEDURE — 1159F MED LIST DOCD IN RCRD: CPT | Performed by: NEUROLOGICAL SURGERY

## 2024-04-05 PROCEDURE — 1160F RVW MEDS BY RX/DR IN RCRD: CPT | Performed by: NEUROLOGICAL SURGERY

## 2024-04-05 PROCEDURE — 99213 OFFICE O/P EST LOW 20 MIN: CPT | Performed by: NEUROLOGICAL SURGERY

## 2024-04-05 NOTE — PROGRESS NOTES
Patient: Bharti Haines  : 1950    Primary Care Provider: Marshal Guerrero MD    Requesting Provider: As above        History    Chief Complaint:   1.  History of thoracic meningioma status postresection.  2.  Low back pain.    History of Present Illness: Ms. Haines is a 74-year-old woman is well-known to my service.  She is previously undergone lumbar surgical intervention.  More recently she presented with the above-noted complaints.  Symptoms also involves her abdomen.  Studies ultimately demonstrated an intradural extramedullary lesion suggestive of meningioma.  On 10/27/2022 she underwent tumor resection via T6 and T7 laminectomies.  Pathology was consistent with a WHO grade 1 meningioma.  Her main complaint now is low back pain that extends into both legs, more so on the right.  Her symptoms are not particularly positional at this point.  She is actually worse at night.  She has to get out of bed and sleep on the couch during the middle of the night.  In  I performed a left L4-5 discectomy.  He continues to have back pain that is not positional.  She does not really describe walking and standing intolerance.  She has to change positions frequently to get comfortable.    Review of Systems   Constitutional:  Negative for activity change, appetite change, chills, diaphoresis, fatigue, fever and unexpected weight change.   HENT:  Negative for congestion, dental problem, drooling, ear discharge, ear pain, facial swelling, hearing loss, mouth sores, nosebleeds, postnasal drip, rhinorrhea, sinus pressure, sinus pain, sneezing, sore throat, tinnitus, trouble swallowing and voice change.    Eyes:  Negative for photophobia, pain, discharge, redness, itching and visual disturbance.   Respiratory:  Negative for apnea, cough, choking, chest tightness, shortness of breath, wheezing and stridor.    Cardiovascular:  Negative for chest pain, palpitations and leg swelling.   Gastrointestinal:  Negative for  "abdominal distention, abdominal pain, anal bleeding, blood in stool, constipation, diarrhea, nausea, rectal pain and vomiting.   Endocrine: Negative for cold intolerance, heat intolerance, polydipsia, polyphagia and polyuria.   Genitourinary:  Negative for decreased urine volume, difficulty urinating, dyspareunia, dysuria, enuresis, flank pain, frequency, genital sores, hematuria, menstrual problem, pelvic pain, urgency, vaginal bleeding, vaginal discharge and vaginal pain.   Musculoskeletal:  Positive for arthralgias, back pain and myalgias. Negative for gait problem, joint swelling, neck pain and neck stiffness.   Skin:  Negative for color change, pallor, rash and wound.   Allergic/Immunologic: Negative for environmental allergies, food allergies and immunocompromised state.   Neurological:  Positive for numbness. Negative for dizziness, tremors, seizures, syncope, facial asymmetry, speech difficulty, weakness, light-headedness and headaches.   Hematological:  Negative for adenopathy. Does not bruise/bleed easily.   Psychiatric/Behavioral:  Positive for sleep disturbance. Negative for agitation, behavioral problems, confusion, decreased concentration, dysphoric mood, hallucinations, self-injury and suicidal ideas. The patient is not nervous/anxious and is not hyperactive.      The patient's past medical history, past surgical history, family history, and social history have been reviewed at length in the electronic medical record.      Physical Exam:   Temp 97.8 °F (36.6 °C) (Infrared)   Ht 143.5 cm (56.5\")   Wt 90.5 kg (199 lb 8 oz)   LMP  (LMP Unknown) Comment: papsmear january 2022 by Dr. Nunez  BMI 43.94 kg/m²   Straight leg raising is negative.  Zaki signs are negative.    Medical Decision Making    Data Review:   (All imaging studies were personally reviewed unless stated otherwise)  MRI of the lumbar spine dated 4/3/2024 is a limited quality study.  There is diffuse degenerative disc disease.  There " is at least moderate stenosis at L3-4 and L4-5.  There may be stenosis at other levels but axial images were not performed through the other levels.  Diffuse facet arthropathy is noted.    Diagnosis:   1.  Thoracic meningioma status post laminectomy for resection.  2.  Mechanical low back pain.  3.  Lumbar stenosis without neurogenic claudication.    Treatment Options:   I have not recommended lumbar surgery.  If her symptoms worsen then we could consider injections.  She is going to think that over.  Otherwise she will follow-up in 1.5 years with a new MRI of the thoracic spine with and without gadolinium.      Scribed for Hipolito Montejo MD by Amy Wood CMA on 4/5/2024 12:13 EDT       I, Dr. Montejo, personally performed the services described in the documentation, as scribed in my presence, and it is both accurate and complete.

## 2024-04-29 ENCOUNTER — TELEPHONE (OUTPATIENT)
Dept: NEUROSURGERY | Facility: CLINIC | Age: 74
End: 2024-04-29
Payer: COMMERCIAL

## 2024-04-29 NOTE — TELEPHONE ENCOUNTER
Provider: DR CANO    Caller: VANNESSA RENTERIA    Relationship to Patient: SELF      Phone Number: [phone]    Reason for Call: PATIENT WOULD LIKE TO PROCEED WITH INJECTIONS.  PLEASE CALL HER TO DISCUSS WHERE YOU WILL BE SENDING HER.  SHE IS CONCERNED ABOUT THE WALKING DISTANCE (TO GET INSIDE OF A FACILITY) SHE WOULD PREFER TO NOT SEE DR PAREDES.    When was the patient last seen: 4-5-24

## 2024-04-30 DIAGNOSIS — M47.819 FACET ARTHROPATHY: ICD-10-CM

## 2024-04-30 DIAGNOSIS — M54.16 LUMBAR RADICULOPATHY: ICD-10-CM

## 2024-04-30 DIAGNOSIS — M48.061 SPINAL STENOSIS OF LUMBAR REGION WITHOUT NEUROGENIC CLAUDICATION: Primary | ICD-10-CM

## 2024-04-30 NOTE — TELEPHONE ENCOUNTER
I called and talked with Bharti and answered her questions regarding the injections and the reasonings.  I explained to her the difference in her arthritis and stenosis and her leg pain and answered her questions.  I also reviewed the MRI of the lumbar spine as well as the thoracic spine because she had questions regarding her meningioma.  I have made a referral to Dr. Yadav, she is in agreement with this plan.    Nikki Ugalde PA-C

## 2024-05-10 ENCOUNTER — HOSPITAL ENCOUNTER (OUTPATIENT)
Dept: MAMMOGRAPHY | Facility: HOSPITAL | Age: 74
Discharge: HOME | End: 2024-05-10
Admitting: INTERNAL MEDICINE
Payer: MEDICARE

## 2024-05-10 DIAGNOSIS — Z12.31 VISIT FOR SCREENING MAMMOGRAM: ICD-10-CM

## 2024-05-10 PROCEDURE — 77067 SCR MAMMO BI INCL CAD: CPT

## 2024-05-10 PROCEDURE — 77063 BREAST TOMOSYNTHESIS BI: CPT

## 2024-05-14 ENCOUNTER — TELEPHONE (OUTPATIENT)
Dept: INTERNAL MEDICINE | Facility: CLINIC | Age: 74
End: 2024-05-14
Payer: COMMERCIAL

## 2024-05-14 NOTE — TELEPHONE ENCOUNTER
Caller: Vannessa Haines    Relationship: Self    Best call back number: [phone]     Caller requesting test results: VANNESSA HAINES     What test was performed: MAMMOGRAM     When was the test performed: 5/10/24     Where was the test performed: [facility]     Additional notes: PLEASE CALL PATIENT BACK WITH THE RESULTS.

## 2024-06-27 ENCOUNTER — TELEPHONE (OUTPATIENT)
Dept: INTERNAL MEDICINE | Facility: CLINIC | Age: 74
End: 2024-06-27
Payer: MEDICARE

## 2024-06-27 NOTE — PROGRESS NOTES
"Chief Complaint: \"Lower back pain and pain in my legs.  I have sciatica.\"        History of Present Illness:   Patient: Ms. Bharti Haines, 74 y.o. female   Referring Physician: Dr. Hipolito Montejo  Reason for Referral: Consultation for chronic intractable lower back pain.   Pain History: Patient reports a 6-year history of chronic intractable lower back pain, which began after a fall tripping over a garden hose.  Since then, her lower back and lower extremity pain have increased significantly.  Patient complains of severe neurogenic claudication.  Her leg symptoms are more severe than her lower back pain.  Her leg pain particularly increases during the nighttime.  She has a history of left L4-L5 discectomy in 2015, by Dr. Montejo with excellent outcome. Patient also has a history of thoracic spine surgery.  On 10/27/2022, she underwent T6 and T7 laminectomies for tumor resection. Pathology was consistent with grade 1 meningioma.  She is being followed up by Dr. Montejo for this issue. MRI of the thoracic spine without contrast on 03/29/2023 revealed postsurgical changes from laminectomy T6 T8 levels.  Mild S-type thoracic scoliosis.  Diffuse spondylosis and facet arthrosis at all thoracic levels.  No significant central canal stenosis. MRI of the lumbar spine with and without contrast on 04/03/2024 (poor quality study) revealed multilevel degenerative changes with disc space narrowing, facet hypertrophy, Schmorl nodes ligamentum flavum hypertrophy, mild degrees of listhesis. At L3-L4 and L4-L5: Moderate to severe canal stenosis.  Bharti Haines underwent surgical consultation with Dr. Hipolito Montejo on 04/05/2024, and was found not to be a surgical candidate. Bharti Haines has failed to obtain pain relief with conservative measures for more than 6 years including oral analgesics, topical analgesics, ice, heat, TENs, physical therapy (last visit within the past 18 months), physical therapist directed home " exercise program HEP (ongoing), therapeutic massage, to name a few. Pain has progressed in intensity over the past years. Bharti Haines is a former patient of Dr. Pollard, with whom she underwent several injections (data deficient) apparently lumbar interlaminar epidural steroid injections at L5-S1 and L3-L4.   Pain Description: Constant lower back pain with intermittent exacerbation, described as aching, dull, sharp, throbbing, and burning sensation.   Radiation of Pain: The pain radiates into the tailbone and into her calves   Pain intensity today: 4/10   Average pain intensity last week: 6/10  Pain intensity ranges from: 4/10 to 9/10  Aggravating factors: Pain increases with lying down, standing, walking. Patient describes neurogenic claudication. Patient uses a cane   Alleviating factors: Pain decreases with sitting down  Associated Symptoms:   Patient reports pain, numbness, and weakness in the lower extremities.   Patient denies any new bladder or bowel problems.   Patient reports difficulties with her balance but denies recent falls.   Pain interferes with ADLs, general activities (ability to walk, stand, transition from different positions), and affects patient's quality of life  Pain interferes with sleep: Falling asleep and causing sleep fragmentation   Muscle spasms: LB  Stiffness: LB    Review of previous therapies and additional medical records:  Bharti Haines has already failed the following measures, including:   Conservative Measures: Oral analgesics, topical analgesics, ice, heat, TENs, physical therapy (last visit within the past 18 months), physical therapist directed home exercise program HEP (ongoing), therapeutic massage  Interventional Measures: Dr. Pollard (partial list)  07/07/2021 #2 ILESI L3/4; 100% pain relief weeks  05/13/2021 #1 ILESI L3/4; 80-85% pain relief weeks  06/18/2018 ILESI L5/S1 100% pain relief for 2 weeks   04/17/2018 #3 LESI L/S1 90% pain relief for 2 weeks   Surgical  Measures:   Bilateral TKR   10/27/2022: Thoracic Spinal Cord Tumor Removal   2015: Left L4-5 discectomy  Bharti Haines underwent surgical consultation with Dr. Hipolito Montejo on 04/05/2024, and was found not to be a surgical candidate.  Bharti Haines presents with significant comorbidities including Hx breast cancer,  HLP, HTN, obesity   In terms of current analgesics, Bharti Haines takes: diclofenac, gabapentin  I have reviewed Spike Report consistent with medication reconciliation.  SOAPP/ORT: Low Risk     PHQ-2 Depression Screening  Little interest or pleasure in doing things? 0-->not at all   Feeling down, depressed, or hopeless? 0-->not at all   PHQ-2 Total Score 0     Pain Self-Efficacy Questionnaire (PSEQ)  ITEM 07-02 2024        I can enjoy things despite the pain. 5        I can do most of the household chores (tidying up, washing dishes, etc), despite the pain. 5        I can socialize with my friends or family members as often as I used to do, despite the pain. 4        I can cope with my pain in most situations. 4        I can do some form of work, despite the pain (includes housework, paid, and unpaid work). 5        I can still do many of the things I enjoy doing, such as hobbies or leisure activity despite pain. 4        I can cope with my pain without medications. 5        I can accomplish most of my goals in life despite the pain. 3        I can live in a normal lifestyle, despite the pain. 6        I can gradually become more active, despite the pain. 3        TOTAL SCORE 44/60            Global Pain Scale 07-02 2024          Pain 20          Feelings 0          Clinical outcomes 10          Activities 12          GPS Total: 42            The [place] Back Pain Disability Scale   DATE 07/02 2024          Sleep through the night 4          Turn over in bed 0          Get out of bed 0          Make your bed 0          Put on socks (pantyhose) 0          Ride in a car 0          Sit in a  chair for several hours 0          Stand up for 20-30 minutes 4          Climb one flight of stairs 4          Walk a few blocks (200-300 yards)  5          Walk several miles 5          Run one block (about 50 yards) 5          Take food out of the refrigerator 0          Reach up to high shelves 0          Move a chair 0          Pull or push heavy doors 0          Bend over to clean the bathtub 4          Throw a ball 5          Carry two bags of groceries 3          Lift and carry a heavy suitcase 5          Total score 44            [place] Claudication Score  All questions are in reference to an average for the past month 07-02 2024          PAIN FREQUENCY:   How often have you experienced pain in your back or buttock or pain that goes down your back, buttock, and/or legs?  Not at all  Less than once a week  At least once a week  Every day for at least a few minutes  Every day for most of the day  Every minute of the day 4          PAIN SEVERITY:   How would you describe the worst pain you have had in your back, buttock, and/or legs?  0. None  1. Mild  2. Moderate  3. Severe  4. Very severe  5. Intolerable 4          BACK PAIN SEVERITY:   How would you describe the pain or discomfort in your back and/or buttocks?  0. None  1. Mild  2. Moderate  3. Severe  4. Very severe  5. Intolerable 2          LEG PAIN SEVERITY:   How would you describe the pain or discomfort in your legs or feet?  None  Mild  Moderate  Severe  Very severe  Intolerable 4          NERVE SYMPTOM SEVERITY:   How would you describe the numbness or tingling in your legs or feet?  None  Mild  Moderate  Severe  Very severe  Intolerable 3          LEG WEAKNESS:   How would you describe the strength in your legs, ankles, or feet?  None  Mild  Moderate  Severe  Very severe  Intolerable 3          BALANCE:   Which statement best describes your steadiness when standing or walking?  0. I have had no problems with my balance.  1. I sometimes feel  off-balance but I am able to walk without any aid.  2. I often feel off-balance but I am able to walk with an aid.  3. I am unable to walk without an aid.  4. I have difficulty walking despite using an aid.  5. I cannot stand up. 2          WALKING DISTANCE:   When you went for a walk, how far were you able to walk before your back or leg started giving you trouble?  0. No limits or more than 2 miles   1. More than 1/4 mile but less than 2 miles  2. More than 100 yards but less than 1/4 mile  3. More than 50 feet but less than 100 yards  4. Less than 50 feet  5. Not at all 4          WALKING ABILITY:   Which statement best describes your walking ability?  0. There is no limit to my walking ability.  1. I can walk far enough to do everything I want to do.  2. I am able to walk comfortably from my home to the shops or my transport.  3. I am able to walk comfortably around the house.  4. I am able to walk only from the bedroom to the bathroom or kitchen.  5. I am not able to walk at all. 4          WALKING SPEED:   Which statement best describes your walking?  0. I am able to walk at a normal speed.  1. I walk slowly but standing upright.  2. I walk slowly and bent forward.  3. I have to stop and stand still when I walk.  4. I have to stop and sit down when I walk.  5. I cannot walk at all. 4          Total Score: Total Score _____% = (___) x 100                                                              50  34              Review of Diagnostic Studies:  I have independently reviewed and interpreted the images with the patient and used the images and a tridimensional spine model to explain findings. I have also reviewed the reports.  MRI of the lumbar spine with and without contrast on 4/3/2024 revealed diffuse lumbar spondylosis, disc space narrowing, facet hypertrophy, Schmorl nodes present at all levels.  Axial imaging:  T9-T10: Disc bulge, facet hypertrophy.  No significant canal stenosis.  Mild foraminal  stenosis   T10-T11: Facet hypertrophy.  No significant canal stenosis.  Mild foraminal stenosis  T11-T12: Disc bulge, facet hypertrophy.  No significant canal stenosis.  Mild foraminal stenosis  T12-L1: Disc bulge, facet hypertrophy.  No significant canal stenosis.  Mild foraminal stenosis  L1-L2: 5 mm retrolisthesis.  Severe disc space narrowing.  Facet hypertrophy.  No significant canal stenosis.  Mild bilateral foraminal stenosis  L2-L3: Small nodes, slight retrolisthesis, disc bulge, facet hypertrophy.  Mild to moderate canal and neuroforaminal stenosis  L3-L4: Disc bulge, disc osteophyte complex, facet hypertrophy, ligamentum flavum hypertrophy.  Moderate canal and foraminal stenosis  L4-L5: Slight retrolisthesis, disc osteophyte complex, facet hypertrophy, ligamentum flavum hypertrophy.  Moderate central canal stenosis and mild to moderate foraminal stenosis.  L5-S1: Severe loss of disc height, facet hypertrophy, ligamentum flavum hypertrophy.  Mild to moderate canal stenosis.  Moderate bilateral neuroforaminal stenosis with bilateral effacement of the L5 nerve roots.  MRI of the thoracic spine without contrast on 3/29/2023 revealed postsurgical changes from laminectomy T6 T8 levels.  Mild S-type thoracic scoliosis.  Diffuse spondylosis and facet arthrosis at all thoracic levels.  No significant central canal stenosis.    Review of Systems   Constitutional:  Positive for activity change.   HENT:  Positive for hearing loss.    Genitourinary:  Positive for difficulty urinating and vaginal pain.   Musculoskeletal:  Positive for arthralgias, back pain and myalgias.   Allergic/Immunologic: Positive for environmental allergies.   All other systems reviewed and are negative.        Patient Active Problem List   Diagnosis    Arthritis    Hyperlipidemia    Hyperglycemia    Hypertension    Class 2 severe obesity due to excess calories with serious comorbidity and body mass index (BMI) of 39.0 to 39.9 in adult     Muscular aches    Venous insufficiency    Osteoarthritis    Low back pain    Arthralgia    Lumbar radiculopathy    Spinal stenosis, lumbar region, with neurogenic claudication    Encounter for pre-operative examination    Spinal meningioma    Lumbar stenosis with neurogenic claudication    Degeneration of lumbar or lumbosacral intervertebral disc    Gait disturbance    Physical deconditioning    Ligamentum flavum hypertrophy    History of lumbar surgery    History of meningioma       Past Medical History:   Diagnosis Date    Allergic     Arthritis     Back problem     Breast cancer     right    Cancer     breast    Cataract     History of breast cancer     HL (hearing loss)     Hx of radiation therapy     right breast    Hyperlipidemia     Hypertension     Menopause     Osteoarthritis     Peptic ulcer     PONV (postoperative nausea and vomiting)     Sciatica          Past Surgical History:   Procedure Laterality Date    BACK SURGERY      left L4-5 discectomy, Dr. Montejo. [facility]. [place], [place]    BREAST BIOPSY Right 2008     bx    BREAST LUMPECTOMY Right 2008    BREAST SURGERY      CARPAL TUNNEL RELEASE Bilateral      SECTION      COLONOSCOPY      JOINT REPLACEMENT Left 2021    total hip on left    LUMBAR SPINAL CORD TUMOR REMOVAL N/A 10/27/2022    Procedure: THORACIC LAMINECTOMY DECOMPRESSIVE POSTERIOR FOR TUMOR T7  ;  Surgeon: Hipolito Montejo MD;  Location: [facility];  Service: Neurosurgery;  Laterality: N/A;    REPLACEMENT TOTAL KNEE Bilateral          Family History   Problem Relation Age of Onset    Hypertension Mother     Cancer Mother     Breast cancer Mother         pt states 60's    Stroke Mother     Hypertension Father     Heart disease Father     Stroke Father     Diabetes Sister     Hypertension Sister     Osteoarthritis Sister     Osteoporosis Sister     Alzheimer's disease Sister     Kidney disease Brother     Kidney disease Brother     Colon cancer  "Brother     Cancer Maternal Aunt     Breast cancer Maternal Aunt         pt states 60's    Arthritis Other     Ovarian cancer Neg Hx          Social History     Socioeconomic History    Marital status:    Tobacco Use    Smoking status: Never    Smokeless tobacco: Never   Vaping Use    Vaping status: Never Used   Substance and Sexual Activity    Alcohol use: No    Drug use: No    Sexual activity: Yes     Partners: Male     Birth control/protection: Post-menopausal           Current Outpatient Medications:     aspirin tablet, Take 81 mg by mouth daily., Disp: , Rfl:     diclofenac (VOLTAREN) 75 MG EC tablet, Take 1 tablet by mouth 2 (Two) Times a Day As Needed (TAKE ONE TABLET BY MOUTH TWICE A DAY AS NEEDED)., Disp: 60 tablet, Rfl: 1    gabapentin (NEURONTIN) 300 MG capsule, Take 1 capsule by mouth 3 (Three) Times a Day., Disp: 90 capsule, Rfl: 2    losartan (COZAAR) 50 MG tablet, Take 1 tablet by mouth Daily., Disp: , Rfl:       Allergies   Allergen Reactions    Cefdinir Rash and Other (See Comments)    Macrolides And Ketolides Rash    Penicillins Rash    Percocet [Oxycodone-Acetaminophen] Rash    Prilosec [Omeprazole] Myalgia    Zofran [Ondansetron Hcl] Itching         Pulse 60   Ht 149.9 cm (59\")   Wt 88.9 kg (195 lb 14.4 oz)   LMP  (LMP Unknown) Comment: papsmear january 2022 by Dr. Nunez  SpO2 97%   BMI 39.57 kg/m²       Physical Exam:  Constitutional: Patient appears well-developed, well-nourished, well-hydrated, appears younger than stated age  HEENT: Head: Normocephalic and atraumatic  Eyes: Conjunctivae and lids are normal  Pupils: Equal, round, reactive to light  Peripheral vascular exam: Posterior tibialis: right 2+ and left 2+. Dorsalis pedis: right 2+ and left 2+. 1+ edema.   Musculoskeletal   Gait and station: Gait evaluation demonstrated shuffling. Unable to walk on heels, toes  Lumbar Spine: Passive and active range of motion are limited secondary to body habitus. Extension, flexion, " lateral flexion, rotation of the lumbar spine did not increase or reproduce pain. Lumbar facet joint loading maneuvers are negative.  Sacroiliac Joints: Zaki's test; Gaenslen's test; thigh thrust test; SI compression test;  posterior shear test; SI distraction test: Negative   Piriformis maneuvers: Negative   Right Hip Joint: The range of motion of the hip joint is limited to flexion and internal rotation but without pain   Left Hip Joint: The range of motion of the hip joint is limited to flexion and internal rotation but without pain   Palpation of the bilateral psoas tendons and iliopsoas bursas: Unrevealing   Palpation of the bilateral greater trochanters: Unrevealing   Examination of the Iliotibial band: Unrevealing  Neurological:   Patient is alert and oriented to person, place, and time.   Speech: Normal.   Cortical function: Normal mental status.   Reflex Scores:  Right patellar: 0+  Left patellar: 0+  Right Achilles: 0+  Left Achilles: 0+  Motor strength: 5/5  Motor Tone: Normal  Involuntary movements: None.   Superficial/Primitive Reflexes: Primitive reflexes were absent.   Right Bear: Absent  Left Bear: Absent  Right ankle clonus: Absent  Left ankle clonus: Absent   Babinsky: Absent  Long tract signs: Negative. Straight leg raising test: Positive at 30-40 degrees with positive Lasegue. Femoral stretch sign: Negative.   Sensory exam: Intact to light touch, intact pain and temperature sensation, intact vibration sensation and normal proprioception  Balance: Normal Romberg's sign: Negative   Skin and subcutaneous tissue: Skin is warm and intact. No rash noted. No cyanosis.   Psychiatric: Judgment and insight: Normal. Recent and remote memory: Intact. Mood and affect: Normal.       ASSESSMENT:   1. Lumbar stenosis with neurogenic claudication    2. Degeneration of lumbar or lumbosacral intervertebral disc    3. Ligamentum flavum hypertrophy    4. Spondylosis of lumbar region without myelopathy or  radiculopathy    5. History of lumbar surgery    6. History of meningioma    7. Class 2 severe obesity due to excess calories with serious comorbidity and body mass index (BMI) of 39.0 to 39.9 in adult    8. Gait disturbance    9. Physical deconditioning        PLAN/MEDICAL DECISION MAKING:  Ms. Bharti Haines, 74 y.o. female presents with a 6-year history of chronic intractable lower back pain, which began after a fall tripping over a garden hose.  Since then, her lower back and lower extremity pain have increased significantly.  Patient complains of severe neurogenic claudication.  Her leg symptoms are more severe than her lower back pain.  Her leg pain particularly increases during the nighttime.  She has a history of left L4-L5 discectomy in 2015, by Dr. Montejo with excellent outcome. Patient also has a history of thoracic spine surgery.  On 10/27/2022, she underwent T6 and T7 laminectomies for tumor resection. Pathology was consistent with grade 1 meningioma.  She is being followed up by Dr. Montejo for this issue. MRI of the thoracic spine without contrast on 03/29/2023 revealed postsurgical changes from laminectomy T6 T8 levels.  Mild S-type thoracic scoliosis.  Diffuse spondylosis and facet arthrosis at all thoracic levels.  No significant central canal stenosis. MRI of the lumbar spine with and without contrast on 04/03/2024 (poor quality study) revealed multilevel degenerative changes with disc space narrowing, facet hypertrophy, Schmorl nodes ligamentum flavum hypertrophy, mild degrees of listhesis. At L3-L4 and L4-L5: Moderate to severe canal stenosis.  Bharti Haines underwent surgical consultation with Dr. Hipolito Montejo on 04/05/2024, and was found not to be a surgical candidate. Bharti Haines has failed to obtain pain relief with conservative measures for more than 6 years including oral analgesics, topical analgesics, ice, heat, TENs, physical therapy (last visit within the past 18 months),  physical therapist directed home exercise program HEP (ongoing), therapeutic massage, to name a few. Pain has progressed in intensity over the past years. Bharti Haines is a former patient of Dr. Pollard, with whom she underwent several injections (data deficient) apparently lumbar interlaminar epidural steroid injections at L5-S1 and L3-L4. A comprehensive evaluation including history and physical exam along with pertinent physiologic and functional assessment was performed. Patient presents with intractable pain due to the diagnoses listed above. Patient has failed to respond to conservative modalities, previous minimally invasive interventional pain management measures, previous surgical interventions, as referenced under HPI. I have documented the impact of patient's moderate-to-severe pain contributing to significant impairment in daily activities, ADLs, and a negative impact on the patient's quality of life, as reflected on Global Pain Scale 42/100; The [place] Back Pain Disability Scale 44/100; [place] Claudication Score 34/50; [place]etti Gait & Balance Assessment Tool  (moderate risk for falls). I have reviewed pertinent supporting diagnostic studies of patient's chronic pain condition as well as all available pertinent medical records to patient's chronic pain condition including previous therapies, as referenced above. PHQ-2 Depression Screening 0; Pain Self-Efficacy Questionnaire (PSEQ) 44/60. I had a lengthy conversation with Ms. Bharti Haines regarding her chronic pain condition and potential therapeutic options including risks, benefits, alternative therapies, to name a few. We have discussed using a stepwise approach starting with the least intense level of care as determined by the extent required to diagnose and or treat a patient's condition. The proposed treatments are consistent with the patient's medical condition and known to be safe and effective by current guidelines and the standard of care. The  duration and frequency proposed are considered appropriate for the service in accordance with accepted standards of medical practice for the diagnosis and treatment of the patient's condition and intended to improve the patient's level of function. These services will be furnished in a setting appropriate to the patient's medical needs and condition. Therefore, I have proposed the following plan:    1. Interventional pain management measures: Patient does not take blood thinners. Patient will be scheduled for diagnostic and therapeutic bilateral L4-L5 transforaminal epidural steroid injections using the lowest effective dose of steroids, under C-arm fluoroscopic guidance, with the use of contrast dye to confirm appropriate needle placement and spread of contrast dye. We may repeat therapeutic bilateral L4-L5 transforaminal epidural steroid injections depending on patient's outcome and following current guidelines. Epidurals will be limited to a maximum of 4 sessions per spinal region in a rolling twelve (12) month period. Continuation of epidural steroid injections over 12 months would only be considered under the following provisions;  Patient is a high-risk surgical candidate, or the patient does not desire surgery, or recurrence of pain in the same location relieved with ESIs for at least three months and epidural provides at least 50% sustained improvement of pain and/or 50% objective improvement in function (using same scale as baseline)  Pain is severe enough to cause a significant degree of functional disability or vocational disability  The primary care provider will be notified regarding continuation of procedures and repeat steroid use   Other options for treatment of patient's pain would include MILD, Vertiflex, Minuteman PNS Sprint, diagnostic superior cluneal nerve blocks by hydrodissection technique under ultrasound guidance, C arm fluoroscopic guidance, and PNS guidance/if beneficial, then, Sprint PNS  Vs Vishnuu, a spinal cord stimulator trial with Jay (will request clearance by Dr. Montejo for this). Patient will follow-up with Dr. Montejo thereafter.     2. Diagnostic studies:   A. Lumbar Spine X-rays, full views including flexion and extension to assess lumbar stability  B. EMG/NCV of the bilateral lower extremities   C. Prior to MILD, SCS trial, etc: CBC, PT, PTT     3. Pharmacological measures: Reviewed and discussed;   A. Patient takes diclofenac, gabapentin  B. Trial with prilocaine 2%, lidocaine 10%, imipramine 3%, capsaicin 0.001%, and mannitol 20%  cream, apply 1 to 2 grams of cream to the affected areas every 4 to 6 hours as needed    4. Long-term rehabilitation efforts:  A. The patient does not have a history of falls. I performed a risk assessment for falls using the Tinetti gait & balance assessment tool (scored high risk for falls). Fall precautions: Patient has been instructed regarding universal fall precautions, such as;   Using gait aids a cane and/or a walker at the appropriate height at all times for ambulation   Removing all area rugs and coffee tables to create a safe environment at home  Ensure clean, dry floors  Wearing supportive footwear and properly fitting clothing  Ensure bed/chair is appropriate height and patient's feet can touch the floor  Using a shower transfer bench  Using walk-in shower and having shower safety bars installed  Ensure proper lighting, minimize glare  Have nightlights operational and in use  Participation in an exercise program for gait training, balance training and strength  Avoid carrying laundry up and down steps  Ensure proper compliance and organization of medications to avoid errors   Avoid use of over the counter sedatives and alcohol consumption  Ensure easy access to call bell, glasses, TV control, telephone  Ensure glasses/hearing aids are in use or close by (on top of night table)  B. Patient will start a comprehensive physical therapy program for  Alter-G, water therapy, progressive aerobic reconditioning, gait and balance training, neurodynamics, core strengthening,  gluteal and abductor strengthening, ultrasound, ASTYM,  E-STIM,  myofascial release,  cupping, dry needling, home exercise program, 2-3 x per week for 8 weeks   C. Contrast therapy: Apply ice-packs for 15-20 minutes, followed by heating pads for 15-20 minutes to affected area   D. Start a low impact exercise program such as water therapy, swimming, yoga  E. Patient's Body mass index is 39.57 kg/m². Patient counseled on the importance of weight loss to help with overall health and pain control.   F. Bharti Haines  reports that she has never smoked. She has never used smokeless tobacco.     5. The patient and her family have been instructed to contact my office with any questions or difficulties. The patient understands the plan and agrees to proceed accordingly.    The patient has a documented plan of care to address chronic pain. Bharti Haines reports a pain score of 5/10.  Given her pain assessment as noted, treatment options were discussed and the following options were decided upon as a follow-up plan to address the patient's pain: continuation of current treatment plan for pain, educational materials on pain management, home exercises and therapy, prescription for non-opiod analgesics, referral to Physical Therapy, referral to specialist for assistance in pain treatment guidance, steroid injections, use of non-medical modalities (ice, heat, stretching and/or behavior modifications), and interventional pain management measures .               Pain Management Panel  More data exists         3/5/2024 3/1/2023   Pain Management Panel   Creatinine, Urine 50  50         DANIEL query complete. DANIEL reviewed by Thomas Yadav MD.     Pain Medications               aspirin tablet Take 81 mg by mouth daily.    diclofenac (VOLTAREN) 75 MG EC tablet Take 1 tablet by mouth 2 (Two) Times a Day As  Needed (TAKE ONE TABLET BY MOUTH TWICE A DAY AS NEEDED).    gabapentin (NEURONTIN) 300 MG capsule Take 1 capsule by mouth 3 (Three) Times a Day.             No orders of the defined types were placed in this encounter.     Please note that portions of this note were completed with a voice recognition program.   Any copied data in any portion of my note has been reviewed by myself and accurate.     The 21st Century Cures Act makes medical notes like this available to patients in the interest of transparency. This is a medical document intended as peer to peer communication. It is written in medical language and may contain abbreviations or verbiage that are unfamiliar. It may appear blunt or direct. Medical documents are intended to carry relevant information, facts as evident, and the clinical opinion of the practitioner.     Thomas Yadav MD    Patient Care Team:  Marshal Guerrero MD as PCP - General (Internal Medicine)  Chucho Pollard MD as Consulting Physician (Anesthesiology)     No orders of the defined types were placed in this encounter.        Future Appointments   Date Time Provider Department [place]   3/11/2025 10:00 AM Marshal Guerrero MD [facility]

## 2024-06-27 NOTE — TELEPHONE ENCOUNTER
Caller: Bharti Haines    Relationship: Self    Best call back number: [phone]     What medication are you requesting: PREMARIN    What are your current symptoms: FEMALE ISSUES    How long have you been experiencing symptoms: 6 MONTHS    Have you had these symptoms before:    [] Yes  [x] No    Have you been treated for these symptoms before:   [] Yes  [x] No    If a prescription is needed, what is your preferred pharmacy and phone number: [facility] [id number] [place] - [facility] [place] - [phone]  - [phone] FX

## 2024-06-28 ENCOUNTER — TELEPHONE (OUTPATIENT)
Dept: INTERNAL MEDICINE | Facility: CLINIC | Age: 74
End: 2024-06-28
Payer: COMMERCIAL

## 2024-06-28 NOTE — TELEPHONE ENCOUNTER
Hub staff attempted to follow warm transfer process and was unsuccessful     Caller: Bharti Haines    Relationship to patient: Self    Best call back number: [phone]    Patient is needing: TO BE CALLED IF AND WHEN PREMARIN IS CALLED TO PHARMACY

## 2024-06-28 NOTE — TELEPHONE ENCOUNTER
Patient is requesting premarin prescription be sent into the pharmacy. She stated that she is having female issues and has been prescribed this medication in the past.

## 2024-06-29 NOTE — TELEPHONE ENCOUNTER
I will not be able to prescribe a new medication to the patient if she has not been seen for an appointment or discusses with her primary care physician.  Thanks.

## 2024-07-02 ENCOUNTER — OFFICE VISIT (OUTPATIENT)
Dept: PAIN MEDICINE | Facility: CLINIC | Age: 74
End: 2024-07-02
Payer: MEDICARE

## 2024-07-02 VITALS — BODY MASS INDEX: 39.49 KG/M2 | HEART RATE: 60 BPM | OXYGEN SATURATION: 97 % | WEIGHT: 195.9 LBS | HEIGHT: 59 IN

## 2024-07-02 DIAGNOSIS — M51.37 DEGENERATION OF LUMBAR OR LUMBOSACRAL INTERVERTEBRAL DISC: ICD-10-CM

## 2024-07-02 DIAGNOSIS — R53.81 PHYSICAL DECONDITIONING: ICD-10-CM

## 2024-07-02 DIAGNOSIS — Z86.018 HISTORY OF MENINGIOMA: ICD-10-CM

## 2024-07-02 DIAGNOSIS — M47.816 SPONDYLOSIS OF LUMBAR REGION WITHOUT MYELOPATHY OR RADICULOPATHY: ICD-10-CM

## 2024-07-02 DIAGNOSIS — E66.01 CLASS 2 SEVERE OBESITY DUE TO EXCESS CALORIES WITH SERIOUS COMORBIDITY AND BODY MASS INDEX (BMI) OF 39.0 TO 39.9 IN ADULT: ICD-10-CM

## 2024-07-02 DIAGNOSIS — M48.062 LUMBAR STENOSIS WITH NEUROGENIC CLAUDICATION: ICD-10-CM

## 2024-07-02 DIAGNOSIS — Z98.890 HISTORY OF LUMBAR SURGERY: ICD-10-CM

## 2024-07-02 DIAGNOSIS — R26.9 GAIT DISTURBANCE: ICD-10-CM

## 2024-07-02 DIAGNOSIS — M24.28 LIGAMENTUM FLAVUM HYPERTROPHY: ICD-10-CM

## 2024-07-02 PROBLEM — M51.379 DEGENERATION OF LUMBAR OR LUMBOSACRAL INTERVERTEBRAL DISC: Status: ACTIVE | Noted: 2024-07-02

## 2024-07-02 PROCEDURE — 1160F RVW MEDS BY RX/DR IN RCRD: CPT | Performed by: ANESTHESIOLOGY

## 2024-07-02 PROCEDURE — 99204 OFFICE O/P NEW MOD 45 MIN: CPT | Performed by: ANESTHESIOLOGY

## 2024-07-02 PROCEDURE — 1125F AMNT PAIN NOTED PAIN PRSNT: CPT | Performed by: ANESTHESIOLOGY

## 2024-07-02 PROCEDURE — 1159F MED LIST DOCD IN RCRD: CPT | Performed by: ANESTHESIOLOGY

## 2024-07-05 DIAGNOSIS — M48.062 LUMBAR STENOSIS WITH NEUROGENIC CLAUDICATION: Primary | ICD-10-CM

## 2024-07-08 ENCOUNTER — TELEPHONE (OUTPATIENT)
Dept: INTERNAL MEDICINE | Facility: CLINIC | Age: 74
End: 2024-07-08
Payer: MEDICARE

## 2024-07-08 DIAGNOSIS — M48.062 LUMBAR STENOSIS WITH NEUROGENIC CLAUDICATION: ICD-10-CM

## 2024-07-08 RX ORDER — CONJUGATED ESTROGENS 0.62 MG/G
CREAM VAGINAL
Qty: 30 G | Refills: 5 | Status: SHIPPED | OUTPATIENT
Start: 2024-07-08

## 2024-07-08 NOTE — TELEPHONE ENCOUNTER
Caller: Bharti Haines    Relationship: Self    Best call back number: [phone]     What medication are you requesting: PERMARIN    What are your current symptoms: FEMALE PROBLEM    How long have you been experiencing symptoms: 6 MONTHS    Have you had these symptoms before:    [] Yes  [x] No    Have you been treated for these symptoms before:   [] Yes  [x] No    If a prescription is needed, what is your preferred pharmacy and phone number: [facility] [id number] [facility] [place] [place] - [facility] [address] - [phone]  - [phone] FX

## 2024-07-08 NOTE — TELEPHONE ENCOUNTER
Spoke with patient, states she is having vaginal and perineum dryness. States it is all on the outside and that it just feels dry and tender when she wipes after urinating.  States nothing inside.  Denies any bleeding, broken skin or sores. States she is not having any sexual intercoarse.  States she used a cream some years ago and it really helped.  States she has talked to several friends that have the same issue and they use Premarin cream and it really helps.

## 2024-07-09 ENCOUNTER — TELEPHONE (OUTPATIENT)
Dept: INTERNAL MEDICINE | Facility: CLINIC | Age: 74
End: 2024-07-09
Payer: MEDICARE

## 2024-07-09 NOTE — TELEPHONE ENCOUNTER
Spoke with patient, states she just wanted to let us know the rx was going to be $400.00 for one tube and that she cannot afford that.  States she is going to check with other pharmacies and see if can find it cheaper and will let us know but does appreciate us sending in rx.

## 2024-07-09 NOTE — TELEPHONE ENCOUNTER
Caller: Bharti Haines    Relationship: Self    Best call back number: [phone]    What is the best time to reach you: ANYTIME    Who are you requesting to speak with (clinical staff, provider,  specific staff member): RICKY    Do you know the name of the person who called: PATIENT    What was the call regarding: PRICE OF MEDICATION THAT DR KENNEDY PRESCRIBED    Is it okay if the provider responds through MyChart:

## 2024-09-10 RX ORDER — DICLOFENAC SODIUM 75 MG/1
75 TABLET, DELAYED RELEASE ORAL 2 TIMES DAILY PRN
Qty: 60 TABLET | Refills: 1 | Status: SHIPPED | OUTPATIENT
Start: 2024-09-10

## 2024-09-16 ENCOUNTER — TELEPHONE (OUTPATIENT)
Dept: INTERNAL MEDICINE | Facility: CLINIC | Age: 74
End: 2024-09-16
Payer: MEDICARE

## 2024-09-16 DIAGNOSIS — M48.061 SPINAL STENOSIS OF LUMBAR REGION WITHOUT NEUROGENIC CLAUDICATION: Primary | ICD-10-CM

## 2024-09-16 RX ORDER — GABAPENTIN 300 MG/1
300 CAPSULE ORAL 3 TIMES DAILY
Qty: 90 CAPSULE | Refills: 2 | Status: SHIPPED | OUTPATIENT
Start: 2024-09-16

## 2024-10-01 ENCOUNTER — TELEPHONE (OUTPATIENT)
Dept: NEUROSURGERY | Facility: CLINIC | Age: 74
End: 2024-10-01
Payer: MEDICARE

## 2024-10-01 NOTE — TELEPHONE ENCOUNTER
Provider: [place]  Surgery/Procedure: Surgery with Hipolito Montejo MD (10/27/2022)    Last visit: Office Visit with Hipolito Montejo MD (04/05/2024)     Next visit: No follow ups on file      Reason for call: Pt LVM stating that she saw Dr. Montejo in April for a benign meningioma and states she has concerns about not having an MRI for 1.5 years. Pt has seen Dr. Yadav in July but has not followed on XR or EMG. Please advise.

## 2024-10-02 NOTE — TELEPHONE ENCOUNTER
Spoke with pt and informed her that 1.5 years is an acceptable time for monitoring of her spinal meningioma. She was interested in getting her MRI scheduled for 10/2025. I informed pt that they do not schedule out that far in advance but that she could give us a call in the spring and we can schedule it then. Pt verbalized understanding and was thankful for the return call.

## 2024-10-24 NOTE — TELEPHONE ENCOUNTER
Patient states she is calling back for her lab results.   
WILNER to return call   Office # given   Notified her on voicemail that the message was sen to Dr Guerrero on Monday   
For information on Fall & Injury Prevention, visit: [email or web address] OR  [email or web address]-tips-[email or web address]-injury OR  https://www.cdc.gov/steadi/patient.html

## 2024-12-03 ENCOUNTER — TELEPHONE (OUTPATIENT)
Dept: INTERNAL MEDICINE | Facility: CLINIC | Age: 74
End: 2024-12-03
Payer: MEDICARE

## 2024-12-03 RX ORDER — DOXYCYCLINE 100 MG/1
100 CAPSULE ORAL 2 TIMES DAILY
Qty: 20 CAPSULE | Refills: 0 | Status: SHIPPED | OUTPATIENT
Start: 2024-12-03 | End: 2024-12-13

## 2024-12-03 NOTE — TELEPHONE ENCOUNTER
"Spoke with patient, informed that Dr Guerrero said \"I have sent in doxycycline.     If symptoms worsen, don't improve, develops a fever needs to be seen.\"    Verbalized good understanding, agreement and appreciation.  "

## 2024-12-03 NOTE — TELEPHONE ENCOUNTER
I have sent in doxycycline.    If symptoms worsen, don't improve, develops a fever needs to be seen.     Marshal Guerrero MD  17:17 EST  12/03/24

## 2024-12-03 NOTE — TELEPHONE ENCOUNTER
Caller: Bharti Haines    Relationship: Self    Best call back number: [phone]     What medication are you requesting: ANTIBIOTICS    What are your current symptoms: COUGH, YELLOW MUCOUS    How long have you been experiencing symptoms: 2 WEEKS     Have you had these symptoms before:    [x] Yes  [] No    Have you been treated for these symptoms before:   [x] Yes  [] No    If a prescription is needed, what is your preferred pharmacy and phone number:  [facility] [id number] - [place], [place] - [address] - [phone]  - [phone] FX     Additional notes: WENT TO URGENT CARE BUT IT WAS SO BUSY SHE WENT HOME. BE AWARE OF MEDICATION ALLERGIES.

## 2024-12-12 ENCOUNTER — TELEPHONE (OUTPATIENT)
Dept: INTERNAL MEDICINE | Facility: CLINIC | Age: 74
End: 2024-12-12
Payer: MEDICARE

## 2024-12-12 NOTE — TELEPHONE ENCOUNTER
Provider: DR KENNEDY    Caller: Bharti Haines    Relationship to Patient: Self    Phone Number:     [phone]        Reason for Call: THE PATIENT HAS HAD A RESPIRATORY INFECTION AND WOULD LIKE TO HAVE HER VITALS CHECKED AND HER LUNGS LISTENED TO ON 12/16/24.

## 2024-12-16 ENCOUNTER — OFFICE VISIT (OUTPATIENT)
Dept: INTERNAL MEDICINE | Facility: CLINIC | Age: 74
End: 2024-12-16
Payer: MEDICARE

## 2024-12-16 VITALS
RESPIRATION RATE: 20 BRPM | TEMPERATURE: 97.1 F | SYSTOLIC BLOOD PRESSURE: 136 MMHG | DIASTOLIC BLOOD PRESSURE: 74 MMHG | HEART RATE: 72 BPM | WEIGHT: 195 LBS | BODY MASS INDEX: 40.76 KG/M2

## 2024-12-16 DIAGNOSIS — J06.9 UPPER RESPIRATORY TRACT INFECTION, UNSPECIFIED TYPE: Primary | ICD-10-CM

## 2024-12-16 DIAGNOSIS — I10 PRIMARY HYPERTENSION: ICD-10-CM

## 2024-12-16 PROCEDURE — 1159F MED LIST DOCD IN RCRD: CPT

## 2024-12-16 PROCEDURE — 99213 OFFICE O/P EST LOW 20 MIN: CPT

## 2024-12-16 PROCEDURE — 1126F AMNT PAIN NOTED NONE PRSNT: CPT

## 2024-12-16 PROCEDURE — 3075F SYST BP GE 130 - 139MM HG: CPT

## 2024-12-16 PROCEDURE — 3078F DIAST BP <80 MM HG: CPT

## 2024-12-16 PROCEDURE — 1160F RVW MEDS BY RX/DR IN RCRD: CPT

## 2024-12-16 RX ORDER — GABAPENTIN 300 MG/1
300 CAPSULE ORAL 3 TIMES DAILY
Qty: 90 CAPSULE | Refills: 2 | Status: SHIPPED | OUTPATIENT
Start: 2024-12-16

## 2024-12-16 NOTE — PROGRESS NOTES
Chief Complaint  Cough (Dry  x 2 - 3 weeks   low grade temp laryngitis taken doxycycline ) and URI    Subjective          Bharti Haines presents to [facility]  History of Present Illness  Patient presents to the office today with concerns for a dry cough that has persisted for 2-3 weeks. She reports a low grade temperature. She initially went to an urgent care but left due to long wait times. However, she was swabbed for COVID, influenza and strep prior to leaving, all of which were negative. She was prescribed doxycycline by her PCP. She is taking her last pill of doxycyline tonight. She feels significantly better. Denies any chest pain, shortness of breath, fevers or chills.       Objective   Vital Signs:   /74 (BP Location: Left arm, Patient Position: Sitting, Cuff Size: Adult)   Pulse 72   Temp 97.1 °F (36.2 °C) (Temporal)   Resp 20   Wt 88.5 kg (195 lb)   BMI 40.76 kg/m²     Body mass index is 40.76 kg/m².    Review of Systems   Constitutional:  Negative for chills and fever.   HENT:  Negative for congestion, ear pain, postnasal drip, rhinorrhea, sinus pressure, sneezing and sore throat.    Respiratory:  Positive for cough. Negative for shortness of breath.    Cardiovascular:  Negative for chest pain.   Neurological:  Negative for headache.       Past History:  Medical History: has a past medical history of Allergic, Arthritis, Back problem, Breast cancer (), Cancer, Cataract, History of breast cancer, HL (hearing loss), radiation therapy (), Hyperlipidemia, Hypertension, Menopause, Osteoarthritis, Peptic ulcer, PONV (postoperative nausea and vomiting), and Sciatica.   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   Family History: family history includes Alzheimer's disease in her sister; Arthritis in an other family member; Breast cancer in her maternal aunt and mother; Cancer in her maternal aunt and mother; Colon cancer in her brother; Diabetes in her sister; Heart disease in her father; Hypertension in her father, mother, and sister; Kidney disease in her brother and brother; Osteoarthritis in her sister; Osteoporosis in her sister; Stroke in her father and mother.   Social History: reports that she has never smoked. She has been exposed to tobacco smoke. She has never used smokeless tobacco. She reports that she does not drink alcohol and does not use drugs.      Current Outpatient Medications:     aspirin tablet, Take 81 mg by mouth daily., Disp: , Rfl:     diclofenac (VOLTAREN) 75 MG EC tablet, TAKE 1 TABLET BY MOUTH TWICE A DAY AS NEEDED, Disp: 60 tablet, Rfl: 1    gabapentin (NEURONTIN) 300 MG capsule, TAKE 1 CAPSULE BY MOUTH 3 TIMES A DAY (Patient taking differently: Take 1 capsule by mouth 3 (Three) Times a Day As Needed.), Disp: 90 capsule, Rfl: 2    losartan (COZAAR) 50 MG tablet, Take 1 tablet by mouth Daily., Disp: , Rfl:     Allergies: Cefdinir, Macrolides and ketolides, Penicillins, Percocet [oxycodone-acetaminophen], Prilosec [omeprazole], and Zofran [ondansetron hcl]    Physical Exam  Vitals and nursing note reviewed.   Constitutional:       General: She is not in acute distress.     Appearance: She is not ill-appearing or toxic-appearing.   HENT:      Head: Normocephalic and atraumatic.   Cardiovascular:      Rate and Rhythm: Normal rate and regular rhythm.      Pulses: Normal pulses.      Heart sounds: Normal heart sounds. No murmur heard.  Pulmonary:      Effort: Pulmonary effort is normal. No respiratory distress.      Breath sounds: Normal breath sounds. No wheezing, rhonchi or rales.   Skin:     General: Skin is  warm.   Neurological:      General: No focal deficit present.      Mental Status: She is alert and oriented to person, place, and time. Mental status is at baseline.   Psychiatric:         Mood and Affect: Mood normal.         Behavior: Behavior normal.         Thought Content: Thought content normal.         Judgment: Judgment normal.          Result Review :             (11/30/2024)   Telephone with Marshal Guerrero MD (12/03/2024)      Assessment and Plan    Assessment & Plan  Upper respiratory tract infection, unspecified type  Stable. She is significantly improving and is taking her last dose of antibiotics tonight.        Primary hypertension  Slightly elevated at 136/74. Denies chest pain, shortness of breath. Recommend she continue to monitor at home as this is likely elevated in setting of illness.           Recommend if symptoms worsen or fail to improve to return to clinic or [facility] for further evaluation. She has a follow up with her PCP March 2025. Patient is agreeable to this and verbalized understanding of plan of care.     Follow Up   Return for Next scheduled follow up.  Patient was given instructions and counseling regarding her condition or for health maintenance advice. Please see specific information pulled into the AVS if appropriate.     MIKE Monzon

## 2024-12-16 NOTE — ASSESSMENT & PLAN NOTE
Slightly elevated at 136/74. Denies chest pain, shortness of breath. Recommend she continue to monitor at home as this is likely elevated in setting of illness.

## 2025-01-02 ENCOUNTER — OFFICE VISIT (OUTPATIENT)
Dept: INTERNAL MEDICINE | Facility: CLINIC | Age: 75
End: 2025-01-02
Payer: MEDICARE

## 2025-01-02 VITALS
WEIGHT: 195 LBS | BODY MASS INDEX: 40.76 KG/M2 | HEART RATE: 68 BPM | TEMPERATURE: 98 F | DIASTOLIC BLOOD PRESSURE: 62 MMHG | RESPIRATION RATE: 20 BRPM | SYSTOLIC BLOOD PRESSURE: 118 MMHG

## 2025-01-02 DIAGNOSIS — J02.9 SORE THROAT: Primary | ICD-10-CM

## 2025-01-02 DIAGNOSIS — J06.9 VIRAL URI WITH COUGH: ICD-10-CM

## 2025-01-02 DIAGNOSIS — I10 ESSENTIAL HYPERTENSION: ICD-10-CM

## 2025-01-02 LAB
EXPIRATION DATE: NORMAL
EXPIRATION DATE: NORMAL
FLUAV AG UPPER RESP QL IA.RAPID: NOT DETECTED
FLUBV AG UPPER RESP QL IA.RAPID: NOT DETECTED
INTERNAL CONTROL: NORMAL
INTERNAL CONTROL: NORMAL
Lab: NORMAL
Lab: NORMAL
S PYO AG THROAT QL: NEGATIVE
SARS-COV-2 AG UPPER RESP QL IA.RAPID: NOT DETECTED

## 2025-01-02 PROCEDURE — 1126F AMNT PAIN NOTED NONE PRSNT: CPT | Performed by: INTERNAL MEDICINE

## 2025-01-02 PROCEDURE — 3078F DIAST BP <80 MM HG: CPT | Performed by: INTERNAL MEDICINE

## 2025-01-02 PROCEDURE — 99214 OFFICE O/P EST MOD 30 MIN: CPT | Performed by: INTERNAL MEDICINE

## 2025-01-02 PROCEDURE — 87880 STREP A ASSAY W/OPTIC: CPT | Performed by: INTERNAL MEDICINE

## 2025-01-02 PROCEDURE — 87428 SARSCOV & INF VIR A&B AG IA: CPT | Performed by: INTERNAL MEDICINE

## 2025-01-02 PROCEDURE — 3074F SYST BP LT 130 MM HG: CPT | Performed by: INTERNAL MEDICINE

## 2025-01-02 RX ORDER — HYDROCODONE POLISTIREX AND CHLORPHENIRAMINE POLISTIREX 10; 8 MG/5ML; MG/5ML
5 SUSPENSION, EXTENDED RELEASE ORAL EVERY 12 HOURS PRN
Qty: 150 ML | Refills: 0 | Status: SHIPPED | OUTPATIENT
Start: 2025-01-02

## 2025-01-02 NOTE — PROGRESS NOTES
Chief Complaint  Sore Throat and URI    Subjective    Bharti Haines is a 74 y.o. female.     Bharti Haines presents to [facility] for     History of Present Illness  The patient presents for evaluation of viral upper respiratory infection, bronchitis, and hypertension.    She has been experiencing a persistent cough since Thanksgiving, which she believes may be causing throat irritation. The cough is productive, yielding greenish-yellow sputum. She reports that her symptoms have not completely resolved since their onset. She does not experience any respiratory distress or chest tightness. However, she experienced a burning sensation in her throat around 6 PM the previous day, which persisted throughout the night before subsiding. The discomfort was initially generalized but later localized to one spot. She also reports an unusual sensation in her ear during coughing episodes, which she attempts to alleviate by applying pressure to her ear. She clarifies that this is not indicative of an earache. The pain was predominantly on the left side of her throat last night. Additionally, she reports nasal congestion today, which is not a daily occurrence, and suspects sinus swelling. She has been managing her symptoms with Halls cough drops and Mucinex.    ALLERGIES  The patient is allergic to MACROLIDES, PENICILLINS, CEPHALOSPORINS, and PERCOCET.    MEDICATIONS  Current: losartan, Mucinex       The following portions of the patient's history were reviewed and updated as appropriate: allergies, current medications, past family history, past medical history, past social history, past surgical history, and problem list.    Review of Systems   All other systems reviewed and are negative.      Objective   Body mass index is 40.76 kg/m².          Vital Signs:   /62 (BP Location: Right arm, Patient Position: Sitting, Cuff Size: Large Adult)   Pulse 68   Temp 98 °F (36.7  °C) (Temporal)   Resp 20   Wt 88.5 kg (195 lb)   BMI 40.76 kg/m²       Physical Exam  Patient is alert and oriented x3, shows no signs of distress.  Head is normocephalic and atraumatic. Pupils are equal and react to light and accommodation. Extraocular muscles are intact. Membranes in the mouth are moist.  Lungs are clear to auscultation. No rhonchi, no wheeze, no retraction on both anterior and posterior aspects of the chest.  Skin is warm, dry, and shows good perfusion.       Results              Assessment and Plan  Diagnoses and all orders for this visit:  Assessment & Plan  1. Viral upper respiratory infection and bronchitis.  After review of history and physical, recommended symptomatic treatment. With concerns of treatment patient was inquiring about using decongestants. Decongestants were discussed, and concerns regarding hypertension were addressed. Alternatives to routine Sudafed, such as Coricidin HBP products, which are over-the-counter remedies for patients with hypertension, were recommended. She expressed a preference for Sudafed, provided it does not elevate her blood pressure. She was advised to monitor her blood pressure closely while on Sudafed and to report any concerns related to elevated blood pressure or side effects, or to discontinue its use. Coricidin HBP products remain a viable alternative. Additionally, Tussionex 5 mL by mouth every 12 hours, particularly at bedtime, was recommended to manage her nocturnal cough, and a prescription for this medication will be provided.    2. Hypertension.  Her blood pressure is well-regulated at present. She will continue her current regimen of losartan 50 mg, one tablet orally once daily.    Follow-up  The patient will follow up with Dr. Guerrero next scheduled follow-up.       Diagnoses and all orders for this visit:    1. Sore throat (Primary)  -     POCT SARS-CoV-2 + Flu Antigen JOEY  -     POC Rapid Strep A    -     Hydrocod Timur-Chlorphe Timur ER  (TUSSIONEX PENNKINETIC) 10-8 MG/5ML ER suspension; Take 5 mL by mouth Every 12 (Twelve) Hours As Needed for Cough or Rhinitis.  Dispense: 150 mL; Refill: 0    2. Viral URI with cough  -     Hydrocod Timur-Chlorphe Timur ER (TUSSIONEX PENNKINETIC) 10-8 MG/5ML ER suspension; Take 5 mL by mouth Every 12 (Twelve) Hours As Needed for Cough or Rhinitis.  Dispense: 150 mL; Refill: 0    3. Essential hypertension              Follow Up   No follow-ups on file.  Patient was given instructions and counseling regarding her condition or for health maintenance advice. Please see specific information pulled into the AVS if appropriate.     Patient or patient representative verbalized consent for the use of Ambient Listening during the visit with  Kole Bell MD for chart documentation. 1/2/2025  12:02 EST

## 2025-01-02 NOTE — PROGRESS NOTES
Chief Complaint  Sore Throat and URI    Subjective    Bharti Haines is a 74 y.o. female.     Bharti Haines presents to [facility] for     History of Present Illness         The following portions of the patient's history were reviewed and updated as appropriate: allergies, current medications, past family history, past medical history, past social history, past surgical history, and problem list.    Review of Systems    Objective   Body mass index is 40.76 kg/m².          Vital Signs:   /62 (BP Location: Right arm, Patient Position: Sitting, Cuff Size: Large Adult)   Pulse 68   Temp 98 °F (36.7 °C) (Temporal)   Resp 20   Wt 88.5 kg (195 lb)   BMI 40.76 kg/m²       Physical Exam         Results              Assessment and Plan  Diagnoses and all orders for this visit:  Assessment & Plan                 Follow Up   No follow-ups on file.  Patient was given instructions and counseling regarding her condition or for health maintenance advice. Please see specific information pulled into the AVS if appropriate.     Patient or patient representative verbalized consent for the use of Ambient Listening during the visit with  Kole Bell MD for chart documentation. 1/2/2025  11:44 EST

## 2025-01-30 RX ORDER — DICLOFENAC SODIUM 75 MG/1
75 TABLET, DELAYED RELEASE ORAL 2 TIMES DAILY PRN
Qty: 60 TABLET | Refills: 1 | Status: SHIPPED | OUTPATIENT
Start: 2025-01-30

## 2025-02-27 ENCOUNTER — TRANSCRIBE ORDERS (OUTPATIENT)
Dept: ADMINISTRATIVE | Facility: HOSPITAL | Age: 75
End: 2025-02-27
Payer: MEDICARE

## 2025-02-27 DIAGNOSIS — Z12.31 VISIT FOR SCREENING MAMMOGRAM: Primary | ICD-10-CM

## 2025-03-11 ENCOUNTER — OFFICE VISIT (OUTPATIENT)
Dept: INTERNAL MEDICINE | Facility: CLINIC | Age: 75
End: 2025-03-11
Payer: MEDICARE

## 2025-03-11 ENCOUNTER — TELEPHONE (OUTPATIENT)
Dept: NEUROSURGERY | Facility: CLINIC | Age: 75
End: 2025-03-11
Payer: MEDICARE

## 2025-03-11 VITALS
SYSTOLIC BLOOD PRESSURE: 138 MMHG | BODY MASS INDEX: 43.19 KG/M2 | HEART RATE: 60 BPM | HEIGHT: 56 IN | RESPIRATION RATE: 20 BRPM | DIASTOLIC BLOOD PRESSURE: 76 MMHG | WEIGHT: 192 LBS | TEMPERATURE: 98.2 F

## 2025-03-11 DIAGNOSIS — I10 ESSENTIAL HYPERTENSION: ICD-10-CM

## 2025-03-11 DIAGNOSIS — R82.998 LEUKOCYTES IN URINE: ICD-10-CM

## 2025-03-11 DIAGNOSIS — R73.02 IMPAIRED GLUCOSE TOLERANCE: ICD-10-CM

## 2025-03-11 DIAGNOSIS — Z00.00 MEDICARE ANNUAL WELLNESS VISIT, SUBSEQUENT: Primary | ICD-10-CM

## 2025-03-11 DIAGNOSIS — D32.1 SPINAL MENINGIOMA: Primary | ICD-10-CM

## 2025-03-11 DIAGNOSIS — E66.812 CLASS 2 SEVERE OBESITY DUE TO EXCESS CALORIES WITH SERIOUS COMORBIDITY AND BODY MASS INDEX (BMI) OF 39.0 TO 39.9 IN ADULT: ICD-10-CM

## 2025-03-11 DIAGNOSIS — E78.5 HYPERLIPIDEMIA, UNSPECIFIED HYPERLIPIDEMIA TYPE: ICD-10-CM

## 2025-03-11 DIAGNOSIS — E66.01 CLASS 2 SEVERE OBESITY DUE TO EXCESS CALORIES WITH SERIOUS COMORBIDITY AND BODY MASS INDEX (BMI) OF 39.0 TO 39.9 IN ADULT: ICD-10-CM

## 2025-03-11 LAB
ALBUMIN SERPL-MCNC: 4.2 G/DL (ref 3.5–5.2)
ALBUMIN/CREATININE RATIO, URINE: NORMAL
ALBUMIN/GLOB SERPL: 1.4 G/DL
ALP SERPL-CCNC: 76 U/L (ref 39–117)
ALT SERPL W P-5'-P-CCNC: 11 U/L (ref 1–33)
ANION GAP SERPL CALCULATED.3IONS-SCNC: 11 MMOL/L (ref 5–15)
AST SERPL-CCNC: 20 U/L (ref 1–32)
BASOPHILS # BLD AUTO: 0.04 10*3/MM3 (ref 0–0.2)
BASOPHILS NFR BLD AUTO: 0.5 % (ref 0–1.5)
BILIRUB BLD-MCNC: NEGATIVE MG/DL
BILIRUB SERPL-MCNC: 0.8 MG/DL (ref 0–1.2)
BUN SERPL-MCNC: 17 MG/DL (ref 8–23)
BUN/CREAT SERPL: 16.3 (ref 7–25)
CALCIUM SPEC-SCNC: 9.1 MG/DL (ref 8.6–10.5)
CHLORIDE SERPL-SCNC: 100 MMOL/L (ref 98–107)
CHOLEST SERPL-MCNC: 221 MG/DL (ref 0–200)
CLARITY, POC: CLEAR
CO2 SERPL-SCNC: 26 MMOL/L (ref 22–29)
COLOR UR: YELLOW
CREAT SERPL-MCNC: 1.04 MG/DL (ref 0.57–1)
DEPRECATED RDW RBC AUTO: 40.4 FL (ref 37–54)
EGFRCR SERPLBLD CKD-EPI 2021: 55.8 ML/MIN/1.73
EOSINOPHIL # BLD AUTO: 0.04 10*3/MM3 (ref 0–0.4)
EOSINOPHIL NFR BLD AUTO: 0.5 % (ref 0.3–6.2)
ERYTHROCYTE [DISTWIDTH] IN BLOOD BY AUTOMATED COUNT: 13 % (ref 12.3–15.4)
EXPIRATION DATE: ABNORMAL
EXPIRATION DATE: NORMAL
GLOBULIN UR ELPH-MCNC: 3 GM/DL
GLUCOSE SERPL-MCNC: 94 MG/DL (ref 65–99)
GLUCOSE UR STRIP-MCNC: NEGATIVE MG/DL
HBA1C MFR BLD: 5.8 % (ref 4.8–5.6)
HCT VFR BLD AUTO: 39.3 % (ref 34–46.6)
HDLC SERPL-MCNC: 62 MG/DL (ref 40–60)
HGB BLD-MCNC: 14 G/DL (ref 12–15.9)
IMM GRANULOCYTES # BLD AUTO: 0.02 10*3/MM3 (ref 0–0.05)
IMM GRANULOCYTES NFR BLD AUTO: 0.3 % (ref 0–0.5)
KETONES UR QL: NEGATIVE
LDLC SERPL CALC-MCNC: 144 MG/DL (ref 0–100)
LDLC/HDLC SERPL: 2.29 {RATIO}
LEUKOCYTE EST, POC: ABNORMAL
LYMPHOCYTES # BLD AUTO: 1.69 10*3/MM3 (ref 0.7–3.1)
LYMPHOCYTES NFR BLD AUTO: 22.7 % (ref 19.6–45.3)
Lab: ABNORMAL
Lab: NORMAL
MCH RBC QN AUTO: 31 PG (ref 26.6–33)
MCHC RBC AUTO-ENTMCNC: 35.6 G/DL (ref 31.5–35.7)
MCV RBC AUTO: 86.9 FL (ref 79–97)
MONOCYTES # BLD AUTO: 0.73 10*3/MM3 (ref 0.1–0.9)
MONOCYTES NFR BLD AUTO: 9.8 % (ref 5–12)
NEUTROPHILS NFR BLD AUTO: 4.94 10*3/MM3 (ref 1.7–7)
NEUTROPHILS NFR BLD AUTO: 66.2 % (ref 42.7–76)
NITRITE UR-MCNC: NEGATIVE MG/ML
NRBC BLD AUTO-RTO: 0 /100 WBC (ref 0–0.2)
PH UR: 5 [PH] (ref 5–8)
PLATELET # BLD AUTO: 232 10*3/MM3 (ref 140–450)
PMV BLD AUTO: 9.8 FL (ref 6–12)
POC CREATININE URINE: 200
POC MICROALBUMIN URINE: 30
POTASSIUM SERPL-SCNC: 4.1 MMOL/L (ref 3.5–5.2)
PROT SERPL-MCNC: 7.2 G/DL (ref 6–8.5)
PROT UR STRIP-MCNC: ABNORMAL MG/DL
RBC # BLD AUTO: 4.52 10*6/MM3 (ref 3.77–5.28)
RBC # UR STRIP: NEGATIVE /UL
SODIUM SERPL-SCNC: 137 MMOL/L (ref 136–145)
SP GR UR: 1.01 (ref 1–1.03)
TRIGL SERPL-MCNC: 85 MG/DL (ref 0–150)
TSH SERPL DL<=0.05 MIU/L-ACNC: 1.17 UIU/ML (ref 0.27–4.2)
UROBILINOGEN UR QL: NORMAL
VLDLC SERPL-MCNC: 15 MG/DL (ref 5–40)
WBC NRBC COR # BLD AUTO: 7.46 10*3/MM3 (ref 3.4–10.8)

## 2025-03-11 PROCEDURE — 87086 URINE CULTURE/COLONY COUNT: CPT | Performed by: INTERNAL MEDICINE

## 2025-03-11 PROCEDURE — 83036 HEMOGLOBIN GLYCOSYLATED A1C: CPT | Performed by: INTERNAL MEDICINE

## 2025-03-11 PROCEDURE — 80053 COMPREHEN METABOLIC PANEL: CPT | Performed by: INTERNAL MEDICINE

## 2025-03-11 PROCEDURE — 84443 ASSAY THYROID STIM HORMONE: CPT | Performed by: INTERNAL MEDICINE

## 2025-03-11 PROCEDURE — 80061 LIPID PANEL: CPT | Performed by: INTERNAL MEDICINE

## 2025-03-11 PROCEDURE — 85025 COMPLETE CBC W/AUTO DIFF WBC: CPT | Performed by: INTERNAL MEDICINE

## 2025-03-11 NOTE — TELEPHONE ENCOUNTER
Called patient to let her know that the imaging is for her spinal meningioma. She understands. Placed new orders. She will await their call to get scheduled for October 2025

## 2025-03-11 NOTE — PATIENT INSTRUCTIONS
Medicare Wellness  Personal Prevention Plan of Service     Date of Office Visit:    Encounter Provider:  Marshal Guerrero MD  Place of Service:  [facility]  Patient Name: Bharti Haines  :  1950    As part of the Medicare Wellness portion of your visit today, we are providing you with this personalized preventive plan of services (PPPS). This plan is based upon recommendations of the [place] Preventive Services Task Force (USPSTF) and the Advisory Committee on Immunization Practices (ACIP).    This lists the preventive care services that should be considered, and provides dates of when you are due. Items listed as completed are up-to-date and do not require any further intervention.    Health Maintenance   Topic Date Due    DXA SCAN  Never done    ZOSTER VACCINE (2 of 3) 2014    TDAP/TD VACCINES (2 - Td or Tdap) 2024    LIPID PANEL  2025    INFLUENZA VACCINE  2025 (Originally 2024)    COVID-19 Vaccine ( - - season) 2026 (Originally 2024)    BMI FOLLOWUP  2025    ANNUAL WELLNESS VISIT  2026    COLORECTAL CANCER SCREENING  2027    HEPATITIS C SCREENING  Completed    RSV Vaccine - Adults  Completed    Pneumococcal Vaccine 50+  Completed    MAMMOGRAM  Discontinued       Orders Placed This Encounter   Procedures    Comprehensive Metabolic Panel     Standing Status:   Future     Expected Date:   2025     Expiration Date:   3/12/2026     Release to patient:   Routine Release [[id number]]    Lipid Panel     Standing Status:   Future     Expected Date:   2025     Expiration Date:   3/12/2026     Release to patient:   Routine Release [[id number]]    TSH     Standing Status:   Future     Expected Date:   2025     Expiration Date:   3/12/2026     Release to patient:   Routine Release [[id number]]    Hemoglobin A1c     Standing Status:   Future     Expected Date:   2025      Expiration Date:   3/12/2026     Release to patient:   Routine Release [[id number]]    POC Urinalysis Dipstick, Automated     Standing Status:   Future     Expected Date:   3/11/2025     Release to patient:   Routine Release [[id number]]    POC Microalbumin     Standing Status:   Future     Expected Date:   3/11/2025     Release to patient:   Routine Release [[id number]]    CBC & Differential     Standing Status:   Future     Expected Date:   4/11/2025     Expiration Date:   3/12/2026     Manual Differential:   No     Release to patient:   Routine Release [[id number]]       Return in about 4 months (around 7/11/2025) for Follow-up.

## 2025-03-11 NOTE — PROGRESS NOTES
Subjective     Medicare Wellness Visit      Bharti Haines is a 75 y.o. patient who presents for a Medicare Wellness Visit.    The following portions of the patient's history were reviewed and   updated as appropriate: allergies, current medications, past family history, past medical history, past social history, past surgical history, and problem list.    Compared to one year ago, the patient's physical   health is the same.  Compared to one year ago, the patient's mental   health is the same.    Recent Hospitalizations:  She was not admitted to the hospital during the last year.     Current Medical Providers:  Patient Care Team:  Marshal Guerrero MD as PCP - General (Internal Medicine)  Chucho Pollard MD as Consulting Physician (Anesthesiology)  Magdalena Junior (Pain Medicine)  Care Team was reviewed with the patient and all reported Care Providers are listed.    Outpatient Medications Prior to Visit   Medication Sig Dispense Refill    aspirin tablet Take 81 mg by mouth daily.      diclofenac (VOLTAREN) 75 MG EC tablet TAKE 1 TABLET BY MOUTH TWICE A DAY AS NEEDED 60 tablet 1    gabapentin (NEURONTIN) 300 MG capsule TAKE 1 CAPSULE BY MOUTH 3 TIMES A DAY (Patient taking differently: Take 1 capsule by mouth 3 (Three) Times a Day As Needed.) 90 capsule 2    losartan (COZAAR) 50 MG tablet Take 1 tablet by mouth Daily.      Hydrocod Timur-Chlorphe Timur ER (TUSSIONEX PENNKINETIC) 10-8 MG/5ML ER suspension Take 5 mL by mouth Every 12 (Twelve) Hours As Needed for Cough or Rhinitis. 150 mL 0     No facility-administered medications prior to visit.     No opioid medication identified on active medication list. I have reviewed chart for other potential  high risk medication/s and harmful drug interactions in the elderly.      Aspirin is on active medication list. Aspirin use is indicated based on review of current medical condition/s. Pros and cons of this therapy have been discussed today. Benefits of this  "medication outweigh potential harm.  Patient has been encouraged to continue taking this medication.  .      Patient Active Problem List   Diagnosis    Arthritis    Hyperlipidemia    Hyperglycemia    Hypertension    Class 2 severe obesity due to excess calories with serious comorbidity and body mass index (BMI) of 39.0 to 39.9 in adult    Muscular aches    Venous insufficiency    Osteoarthritis    Low back pain    Arthralgia    Lumbar radiculopathy    Spinal stenosis, lumbar region, with neurogenic claudication    Encounter for pre-operative examination    Spinal meningioma    Lumbar stenosis with neurogenic claudication    Degeneration of lumbar or lumbosacral intervertebral disc    Gait disturbance    Physical deconditioning    Ligamentum flavum hypertrophy    History of lumbar surgery    History of meningioma     Advance Care Planning Advance Directive is on file.  ACP discussion was held with the patient during this visit. Patient has an advance directive in EMR which is still valid.             Objective   Vitals:    03/11/25 1004   BP: 138/76   BP Location: Left arm   Patient Position: Sitting   Cuff Size: Adult   Pulse: 60   Resp: 20   Temp: 98.2 °F (36.8 °C)   TempSrc: Infrared   Weight: 87.1 kg (192 lb)   Height: 142.9 cm (56.25\")   PainSc: 0-No pain       Estimated body mass index is 42.66 kg/m² as calculated from the following:    Height as of this encounter: 142.9 cm (56.25\").    Weight as of this encounter: 87.1 kg (192 lb).    Finger Rub Hearing{Test (right ear):passed  Finger Rub Hearing{Test (left ear):passed                Does the patient have evidence of cognitive impairment? No                                                                                               Health  Risk Assessment    Smoking Status:  Social History     Tobacco Use   Smoking Status Never    Passive exposure: Past   Smokeless Tobacco Never     Alcohol Consumption:  Social History     Substance and Sexual Activity "   Alcohol Use No       Fall Risk Screen  STEADI Fall Risk Assessment was completed, and patient is at LOW risk for falls.Assessment completed on:3/11/2025    Depression Screening  Little interest or pleasure in doing things? Not at all   Feeling down, depressed, or hopeless? Not at all   PHQ-2 Total Score 0      Health Habits and Functional and Cognitive Screening:      3/11/2025    10:15 AM   Functional & Cognitive Status   Do you have difficulty preparing food and eating? No   Do you have difficulty bathing yourself, getting dressed or grooming yourself? No   Do you have difficulty using the toilet? No   Do you have difficulty moving around from place to place? No   Do you have trouble with steps or getting out of a bed or a chair? No   Current Diet Unhealthy Diet   Dental Exam Up to date   Eye Exam Up to date   Exercise (times per week) 0 times per week   Current Exercises Include No Regular Exercise   Do you need help using the phone?  No   Are you deaf or do you have serious difficulty hearing?  No   Do you need help to go to places out of walking distance? No   Do you need help shopping? No   Do you need help preparing meals?  No   Do you need help with housework?  No   Do you need help with laundry? No   Do you need help taking your medications? No   Do you need help managing money? No   Do you ever drive or ride in a car without wearing a seat belt? No   Have you felt unusual stress, anger or loneliness in the last month? No   Who do you live with? Spouse   If you need help, do you have trouble finding someone available to you? No   Have you been bothered in the last four weeks by sexual problems? No   Do you have difficulty concentrating, remembering or making decisions? No           Age-appropriate Screening Schedule:  Refer to the list below for future screening recommendations based on patient's age, sex and/or medical conditions. Orders for these recommended tests are listed in the plan section. The  patient has been provided with a written plan.    Health Maintenance List  Health Maintenance   Topic Date Due    DXA SCAN  Never done    ZOSTER VACCINE (2 of 3) 07/18/2014    TDAP/TD VACCINES (2 - Td or Tdap) 05/23/2024    COVID-19 Vaccine (4 - 2024-25 season) 09/01/2024    ANNUAL WELLNESS VISIT  03/05/2025    LIPID PANEL  03/05/2025    INFLUENZA VACCINE  03/31/2025 (Originally 7/1/2024)    BMI FOLLOWUP  07/05/2025    COLORECTAL CANCER SCREENING  01/21/2027    HEPATITIS C SCREENING  Completed    RSV Vaccine - Adults  Completed    Pneumococcal Vaccine 50+  Completed    MAMMOGRAM  Discontinued                                                                                                                                                  Risk Factors Identified During Encounter  Inactivity/Sedentary: Patient was advised to exercise at least 150 minutes a week per CDC recommendations.    The above risks/problems have been discussed with the patient.  Pertinent information has been shared with the patient in the After Visit Summary.  An After Visit Summary and PPPS were made available to the patient.    Diagnoses and all orders for this visit:    1. Medicare annual wellness visit, subsequent (Primary)    2. Essential hypertension  -     CBC & Differential; Future  -     Comprehensive Metabolic Panel; Future  -     TSH; Future  -     POC Urinalysis Dipstick, Automated; Future    3. Hyperlipidemia, unspecified hyperlipidemia type  -     Comprehensive Metabolic Panel; Future  -     Lipid Panel; Future    4. Impaired glucose tolerance  -     Comprehensive Metabolic Panel; Future  -     Hemoglobin A1c; Future  -     POC Microalbumin; Future    5. Class 2 severe obesity due to excess calories with serious comorbidity and body mass index (BMI) of 39.0 to 39.9 in adult

## 2025-03-11 NOTE — PROGRESS NOTES
Chief Complaint   Patient presents with    Medicare Wellness-subsequent       History of Present Illness      The patient presents for a follow-up related to hyperlipidemia. She is following a low fat diet. She reports that she is exercising. She is not taking medication for hyperlipidemia. She denies chest pain, shortness of breath, orthopnea, paroxysmal nocturnal dyspnea, dyspnea on exertion, edema, palpitations or syncope.    The patient presents for a follow-up related to hypertension. The patient reports that she has had no headaches or blurred vision. She states that she is taking her medication as prescribed. She is not having medication side effects.    The patient presents for a follow-up related to impaired glucose tolerance. She does not check her blood sugars at home. She denies hypoglycemic symptoms. The patient denies polyuria, polydypsia or polyphagia. She reports no symptoms of neuropathy. The patient is not on medication for her impaired glucose tolerance. The patient does check her feet daily at home.    Medications      Current Outpatient Medications:     aspirin tablet, Take 81 mg by mouth daily., Disp: , Rfl:     diclofenac (VOLTAREN) 75 MG EC tablet, TAKE 1 TABLET BY MOUTH TWICE A DAY AS NEEDED, Disp: 60 tablet, Rfl: 1    gabapentin (NEURONTIN) 300 MG capsule, TAKE 1 CAPSULE BY MOUTH 3 TIMES A DAY (Patient taking differently: Take 1 capsule by mouth 3 (Three) Times a Day As Needed.), Disp: 90 capsule, Rfl: 2    losartan (COZAAR) 50 MG tablet, Take 1 tablet by mouth Daily., Disp: , Rfl:      Allergies    Allergies   Allergen Reactions    Cefdinir Rash and Other (See Comments)    Macrolides And Ketolides Rash    Penicillins Rash    Percocet [Oxycodone-Acetaminophen] Rash    Prilosec [Omeprazole] Myalgia    Zofran [Ondansetron Hcl] Itching       Problem List    Patient Active Problem List   Diagnosis    Arthritis    Hyperlipidemia    Hyperglycemia    Hypertension    Class 2 severe obesity due to  "excess calories with serious comorbidity and body mass index (BMI) of 39.0 to 39.9 in adult    Muscular aches    Venous insufficiency    Osteoarthritis    Low back pain    Arthralgia    Lumbar radiculopathy    Spinal stenosis, lumbar region, with neurogenic claudication    Encounter for pre-operative examination    Spinal meningioma    Lumbar stenosis with neurogenic claudication    Degeneration of lumbar or lumbosacral intervertebral disc    Gait disturbance    Physical deconditioning    Ligamentum flavum hypertrophy    History of lumbar surgery    History of meningioma       Medications, Allergies, Problems List and Past History were reviewed and updated.    Physical Examination    /76 (BP Location: Left arm, Patient Position: Sitting, Cuff Size: Adult)   Pulse 60   Temp 98.2 °F (36.8 °C) (Infrared)   Resp 20   Ht 142.9 cm (56.25\")   Wt 87.1 kg (192 lb)   LMP  (LMP Unknown) Comment: papsmear january 2022 by Dr. Nunez  BMI 42.66 kg/m²       HEENT: Head- Normocephalic Atraumatic. Facies- Within normal limits. Pinnas- Normal texture and shape bilaterally. Canals- Normal bilaterally. TMs- Normal bilaterally. Nares- Patent bilaterally. Nasal Septum- is normal. There is no tenderness to palpation over the frontal or maxillary sinuses. Lids- Normal bilaterally. Conjunctiva- Clear bilaterally. Sclera- Anicteric bilaterally. Oropharynx- Moist with no lesions. Tonsils- No enlargement, erythema or exudate.    Neck: Thyroid- non enlarged, symmetric and has no nodules. No bruits are detected. ROM- Normal Range of Motion with no rigidity.    Lungs: Auscultation- Clear to auscultation bilaterally. There are no retractions, clubbing or cyanosis. The Expiratory to Inspiratory ratio is equal.    Cardiovascular: There are no carotid bruits. Heart- Normal Rate with Regular rhythm and no murmurs. There are no gallops. There are no rubs. In the lower extremities there is no edema. The upper extremities do not have " edema.    Abdomen: Soft, benign, non-tender with no masses, hernias, organomegaly or scars.      Impression and Assessment    Hyperlipidemia.    Essential Hypertension.    Impaired Glucose Tolerance.    Plan    Hyperlipidemia Plan: The patient was instructed to exercise daily and eat a low fat diet.    Essential Hypertension Plan: The patient was instructed to continue the current medications.    Impaired Glucose Tolerance Plan: Further plans will be formulated after test results are reviewed.    Diagnoses and all orders for this visit:    1. Medicare annual wellness visit, subsequent (Primary)    2. Essential hypertension  -     CBC & Differential; Future  -     Comprehensive Metabolic Panel; Future  -     TSH; Future  -     POC Urinalysis Dipstick, Automated; Future    3. Hyperlipidemia, unspecified hyperlipidemia type  -     Comprehensive Metabolic Panel; Future  -     Lipid Panel; Future    4. Impaired glucose tolerance  -     Comprehensive Metabolic Panel; Future  -     Hemoglobin A1c; Future  -     POC Microalbumin; Future    5. Class 2 severe obesity due to excess calories with serious comorbidity and body mass index (BMI) of 39.0 to 39.9 in adult            Return to Office    The patient was instructed to return for follow-up in 4 months. The patient was instructed to return sooner if the condition changes, worsens, or does not resolve.

## 2025-03-11 NOTE — TELEPHONE ENCOUNTER
Caller: VANNESSA RENTERIA    Relationship: SELF    Best call back number: [phone]    What orders are you requesting (i.e. lab or imaging): MRI THORACIC (& LUMBAR ?)      Where will you receive your lab/imaging services: [facility]    Additional notes: PATIENT IS CONFUSED ON WHAT IMAGING SHE NEEDS.  THE ORDERS IN HER CHART WILL  BEFORE SHE CAN GET THE IMAGING SCHEDULED.  PLEASE ADVISE ON WHAT SHE NEEDS AND PLACE NEW ORDERS.  THANK YOU.

## 2025-03-13 LAB — BACTERIA SPEC AEROBE CULT: NO GROWTH

## 2025-05-19 ENCOUNTER — HOSPITAL ENCOUNTER (OUTPATIENT)
Dept: MAMMOGRAPHY | Facility: HOSPITAL | Age: 75
Discharge: HOME | End: 2025-05-19
Admitting: INTERNAL MEDICINE
Payer: MEDICARE

## 2025-05-19 DIAGNOSIS — Z12.31 VISIT FOR SCREENING MAMMOGRAM: ICD-10-CM

## 2025-05-19 PROCEDURE — 77063 BREAST TOMOSYNTHESIS BI: CPT

## 2025-05-19 PROCEDURE — 77067 SCR MAMMO BI INCL CAD: CPT | Performed by: RADIOLOGY

## 2025-05-19 PROCEDURE — 77063 BREAST TOMOSYNTHESIS BI: CPT | Performed by: RADIOLOGY

## 2025-05-19 PROCEDURE — 77067 SCR MAMMO BI INCL CAD: CPT

## 2025-05-19 RX ORDER — GABAPENTIN 300 MG/1
300 CAPSULE ORAL 3 TIMES DAILY
Qty: 90 CAPSULE | Refills: 2 | Status: SHIPPED | OUTPATIENT
Start: 2025-05-19

## 2025-07-16 ENCOUNTER — TELEPHONE (OUTPATIENT)
Dept: NEUROSURGERY | Facility: CLINIC | Age: 75
End: 2025-07-16
Payer: MEDICARE

## 2025-07-16 NOTE — TELEPHONE ENCOUNTER
Yes. She's scheduled for both a thoracic and lumbar MRI with followup thereafter.  Orders have already been placed by Dr. Montejo at her last appointment.  Lets get those moved up as quickly as possible and have her come back to see us as soon as they can be done

## 2025-07-16 NOTE — TELEPHONE ENCOUNTER
Provider: DR CANO    Caller: VANNESSA RENTERIA    Relationship to Patient: SELF    Phone Number: [phone]    Reason for Call: PATIENT CALLED ALMOST IN TEARS STATING SHE IS IN TERRIBLE PAIN AND CAN'T WAIT UNTIL HER SEPTEMBER APPOINTMENT TO BE SEEN.    HASN'T BEEN ABLE TO SLEEP FOR THE LAST THREE NIGHTS    CAN BARELY GET OUT OF CHAIR.    STATES SHE HAD A PAIN SHOT MAYBE 6 WEEK AGO, SHE FEELS LIKE THIS AGGRAVATED THE NERVES IN HER LEGS.  AND THEN THIS PAIN HAS COME ON.    SHE FEELS PRESSURE ON HER BLADDER.    When was the patient last seen: 4-5-24    When did it start: PAIN HAS INCREASED IN LAST 48 HOURS, BUT HAS BEEN ONGOING FOR THE LAST COUPLE WEEKS.    Where is it located: DOWN BOTH LEGS, IN LEFT HIP-NUMBNESS IN CALVES AND FEET (NOT A NEW PROBLEM, THE NUMBNESS)    Characteristics of symptom/severity: ON A SCALE OF 1-10 SHE RATES HER PAIN AT A 10    Timing- Is it constant or intermittent: CONSTANT    What makes it better: NOTHING    What therapies/medications have you tried: GABAPENTIN, DICLOFENAC-NOT WORKING  SHE HAS TRIED TYLENOL AND ADVIL, NOT HELPING

## 2025-07-16 NOTE — TELEPHONE ENCOUNTER
Provider:  [place]  Surgery/Procedure:  THORACIC LAMINECTOMY DECOMPRESSIVE POSTERIOR FOR TUMOR T7     Surgery/Procedure Date:  10/27/22  Last visit:   Office Visit with Hipolito Montejo MD (04/05/2024)   Next visit: 09/12/25     Reason for call:    S/w pt. She states her pain has gradually worsened over the past 8 weeks.  She denies any exacerbating event.  She states her pain is primarily in her back. The numbness in her legs improved after her surgery but stayed in her feet.  Over the past 8 weeks, her numbness has moved up her legs to her knees.   Her symptoms have been intolerable for the past 3 days and she has been unable to rest.  She feels pressure in her low back and bladder.  Denies incontinence or other urinary or bowel symptoms.      Pt is prescribed gabapentin 300 mg TID, but states she takes these prn because she doesn't tolerate medications well.  She takes no more than 1 daily, but not everyday.  She also takes her diclofenac prn.  Her meds do not help her symptoms.      Pt would like to have her appt and MRI moved up.  She feels something has changed.  Injections have not helped.

## 2025-07-17 NOTE — TELEPHONE ENCOUNTER
I have rescheduled this multiple times trying to get a faster appointment but have been unsuccessful.  The patient does not want to go to the [facility] or [place], I have even tired to get [facility].  She is still currently unhappy with the dates we have, but I am just not able to adjust them at this time.  She has been added to the wait-list for possible cancellations.

## 2025-07-17 NOTE — TELEPHONE ENCOUNTER
PT CALLED TO CHECK ON MRI AND F/U APPTS. HUB ADVISED UNABLE TO CHANGE DATES FOR MRIS. SHE WILL TRY TO DO IT AS SCHEDULED

## 2025-07-18 NOTE — TELEPHONE ENCOUNTER
PATIENT CALLED TO SAY HER MRI'S WERE RESCHEDULED FOR 7-19-25.  SHE IS WANTING TO GET IN SOONER WITH DR CANO.  PLEASE ADVISE IF OK TO RESCHEDULE?

## 2025-07-19 ENCOUNTER — HOSPITAL ENCOUNTER (OUTPATIENT)
Facility: HOSPITAL | Age: 75
Discharge: HOME | End: 2025-07-19
Payer: MEDICARE

## 2025-07-19 DIAGNOSIS — D32.1 SPINAL MENINGIOMA: ICD-10-CM

## 2025-07-19 PROCEDURE — 72157 MRI CHEST SPINE W/O & W/DYE: CPT

## 2025-07-19 PROCEDURE — 72158 MRI LUMBAR SPINE W/O & W/DYE: CPT

## 2025-07-19 PROCEDURE — 25510000002 GADOBENATE DIMEGLUMINE 529 MG/ML SOLUTION: Performed by: NEUROLOGICAL SURGERY

## 2025-07-19 PROCEDURE — A9577 INJ MULTIHANCE: HCPCS | Performed by: NEUROLOGICAL SURGERY

## 2025-07-19 RX ADMIN — GADOBENATE DIMEGLUMINE 18 ML: 529 INJECTION, SOLUTION INTRAVENOUS at 14:37

## 2025-07-29 ENCOUNTER — OFFICE VISIT (OUTPATIENT)
Dept: NEUROSURGERY | Facility: CLINIC | Age: 75
End: 2025-07-29
Payer: MEDICARE

## 2025-07-29 VITALS — BODY MASS INDEX: 40.72 KG/M2 | HEIGHT: 58 IN | TEMPERATURE: 98.4 F | WEIGHT: 194 LBS

## 2025-07-29 DIAGNOSIS — M47.819 FACET ARTHROPATHY: ICD-10-CM

## 2025-07-29 DIAGNOSIS — D32.1 SPINAL MENINGIOMA: ICD-10-CM

## 2025-07-29 DIAGNOSIS — M51.9 LUMBAR DISC DISEASE: ICD-10-CM

## 2025-07-29 DIAGNOSIS — M48.062 SPINAL STENOSIS OF LUMBAR REGION WITH NEUROGENIC CLAUDICATION: Primary | ICD-10-CM

## 2025-07-29 PROCEDURE — 1159F MED LIST DOCD IN RCRD: CPT | Performed by: NEUROLOGICAL SURGERY

## 2025-07-29 PROCEDURE — 1160F RVW MEDS BY RX/DR IN RCRD: CPT | Performed by: NEUROLOGICAL SURGERY

## 2025-07-29 PROCEDURE — 99214 OFFICE O/P EST MOD 30 MIN: CPT | Performed by: NEUROLOGICAL SURGERY

## 2025-07-29 NOTE — PROGRESS NOTES
Patient: Bharti Haines  : 1950    Primary Care Provider: Marshal Guerrero MD    Requesting Provider: As above        History    Chief Complaint:   1.  History of thoracic meningioma status post resection.  2.  Chronic mechanical low back pain.    History of Present Illness: Ms. Haines is a 75-year-old woman underwent left L4-5 discectomy in .  Several years ago she presented with increasing pain and was noted to have a thoracic meningioma.  On 10/27/2022 she underwent T6 and T7 laminectomies for resection of a WHO grade 1 meningioma.  She has chronic back issues.  Several weeks ago she had an injection done at the [facility].  That seemed to upset her back rather than help her.  Therapy has made her worse in the past.  She complains of some pain that shoots down her legs.  The pain is worse in the morning but if she stands or walks too long then her legs get heavy.    Review of Systems   Constitutional:  Negative for activity change, appetite change, chills, diaphoresis, fatigue, fever and unexpected weight change.   HENT:  Negative for congestion, dental problem, drooling, ear discharge, ear pain, facial swelling, hearing loss, mouth sores, nosebleeds, postnasal drip, rhinorrhea, sinus pressure, sneezing, sore throat, tinnitus, trouble swallowing and voice change.    Eyes:  Negative for photophobia, pain, discharge, redness, itching and visual disturbance.   Respiratory:  Negative for apnea, cough, choking, chest tightness, shortness of breath, wheezing and stridor.    Cardiovascular:  Negative for chest pain, palpitations and leg swelling.   Gastrointestinal:  Negative for abdominal distention, abdominal pain, anal bleeding, blood in stool, constipation, diarrhea, nausea, rectal pain and vomiting.   Endocrine: Negative for cold intolerance, heat intolerance, polydipsia, polyphagia and polyuria.   Genitourinary:  Negative for decreased urine volume, difficulty urinating, dysuria, enuresis,  "flank pain, frequency, genital sores, hematuria and urgency.   Musculoskeletal:  Negative for arthralgias, back pain, gait problem, joint swelling, myalgias, neck pain and neck stiffness.   Skin:  Negative for color change, pallor, rash and wound.   Allergic/Immunologic: Negative for environmental allergies, food allergies and immunocompromised state.   Neurological:  Negative for dizziness, tremors, seizures, syncope, facial asymmetry, speech difficulty, weakness, light-headedness, numbness and headaches.   Hematological:  Negative for adenopathy. Does not bruise/bleed easily.   Psychiatric/Behavioral:  Negative for agitation, behavioral problems, confusion, decreased concentration, dysphoric mood, hallucinations, self-injury, sleep disturbance and suicidal ideas. The patient is not nervous/anxious and is not hyperactive.    All other systems reviewed and are negative.      The patient's past medical history, past surgical history, family history, and social history have been reviewed at length in the electronic medical record.      Physical Exam:   Temp 98.4 °F (36.9 °C) (Temporal)   Ht 147.3 cm (58\")   Wt 88 kg (194 lb)   LMP  (LMP Unknown) Comment: papsmear january 2022 by Dr. Nunez  BMI 40.55 kg/m²   Straight leg raising is negative.  Her gait is normal.  Reflexes are difficult to elicit throughout.    Medical Decision Making    Data Review:   (All imaging studies were personally reviewed unless stated otherwise)  MRI of the thoracic spine demonstrates postsurgical changes without residual or recurrent meningioma.      Lumbar spine MRI dated 7/19/2025 demonstrates diffuse degenerative disc disease and facet arthropathy.  The spinal cord ends at the L1-2 level.  There is high-grade stenosis at L3-4 and L4-5 and at least moderate stenosis at L2-3.    Diagnosis:   1.  Thoracic meningioma status post resection, doing well.  2.  Chronic mechanical low back pain.  3.  Lumbar stenosis with some degree of " neurogenic claudication.    Treatment Options:   The patient is struggling but is not particular interested in surgical intervention.  At any rate, surgical intervention would be a partial solution for her back difficulties.  She is going to give this some time.  If symptoms worsen then she may want to consider another injection and I would make a referral to Dr.'s Yadav who she has seen in the past.  He had previously discussed spinal cord stimulator placement but she was somewhat skiddish about that.  She will follow-up in 1 year with a new MRI of her thoracic spine with and without gadolinium.  If she would like to discuss surgical intervention in terms of decompressive laminectomies then she will follow-up with me in the interim.      Scribed for Hipolito Montejo MD by Amy Wood CMA on 7/29/2025 15:22 EDT       I, Dr. Montejo, personally performed the services described in the documentation, as scribed in my presence, and it is both accurate and complete.

## 2025-08-28 RX ORDER — GABAPENTIN 300 MG/1
300 CAPSULE ORAL 3 TIMES DAILY
Qty: 90 CAPSULE | Refills: 2 | Status: SHIPPED | OUTPATIENT
Start: 2025-08-28

## (undated) DEVICE — ANTIBACTERIAL UNDYED BRAIDED (POLYGLACTIN 910), SYNTHETIC ABSORBABLE SUTURE: Brand: COATED VICRYL

## (undated) DEVICE — SUT VIC PLS CTD ANTIB BR 3/0 8/18IN 45CM

## (undated) DEVICE — NEEDLE, QUINCKE, 18GX3.5": Brand: MEDLINE

## (undated) DEVICE — SNAP KOVER: Brand: UNBRANDED

## (undated) DEVICE — TOOL MR8-14MH30 MR8 14CM MATCH 3MM: Brand: MIDAS REX MR8

## (undated) DEVICE — TOTAL TRAY, 16FR 10ML SIL FOLEY, URN: Brand: MEDLINE

## (undated) DEVICE — SUT SILK 2/0 TIES 18IN A185H

## (undated) DEVICE — GLV SURG PREMIERPRO MIC LTX PF SZ6.5 BRN

## (undated) DEVICE — CVR HNDL LIGHT RIGID

## (undated) DEVICE — SUT ETHLN 3/0 FS1 30IN 669H

## (undated) DEVICE — APPL CHLORAPREP TINTED 26ML TEAL

## (undated) DEVICE — ELECTRD BLD EZ CLN MOD 4IN

## (undated) DEVICE — NEURO SPONGES: Brand: DEROYAL

## (undated) DEVICE — DISPOSABLE TUBING SET AND EXTENDER FILTER TUBING

## (undated) DEVICE — TOWEL,OR,DSP,ST,BLUE,STD,4/PK,20PK/CS: Brand: MEDLINE

## (undated) DEVICE — GLV SURG PREMIERPRO MIC LTX PF SZ7.5 BRN

## (undated) DEVICE — DRP MICROSCOPE 4 BINOCULAR CV 54X150IN

## (undated) DEVICE — STRAIGHT MICRO DIAMETER TIP, UNIVERSAL

## (undated) DEVICE — POSTN ARM CRDL LAMIN PK/2

## (undated) DEVICE — PK CRANI 10

## (undated) DEVICE — GLV SURG PREMIERPRO MIC LTX PF SZ7 BRN

## (undated) DEVICE — SUT PROLN 6/0 BV1 D/A 30IN 8709H

## (undated) DEVICE — HDRST INTUB GENTLETOUCH SLOT 7IN RT